# Patient Record
Sex: FEMALE | Race: WHITE | NOT HISPANIC OR LATINO | Employment: FULL TIME | URBAN - METROPOLITAN AREA
[De-identification: names, ages, dates, MRNs, and addresses within clinical notes are randomized per-mention and may not be internally consistent; named-entity substitution may affect disease eponyms.]

---

## 2023-08-22 PROBLEM — H81.10 BENIGN PAROXYSMAL POSITIONAL VERTIGO: Status: ACTIVE | Noted: 2023-08-22

## 2023-08-22 PROBLEM — E66.01 OBESITY, MORBID, BMI 50 OR HIGHER (MULTI): Status: ACTIVE | Noted: 2023-08-22

## 2023-08-22 PROBLEM — E11.9 TYPE 2 DIABETES MELLITUS WITHOUT COMPLICATION, WITHOUT LONG-TERM CURRENT USE OF INSULIN (MULTI): Status: ACTIVE | Noted: 2023-08-22

## 2023-08-22 PROBLEM — M25.519 SHOULDER PAIN: Status: ACTIVE | Noted: 2023-08-22

## 2023-08-22 PROBLEM — F41.9 ANXIETY DISORDER: Status: ACTIVE | Noted: 2023-08-22

## 2023-08-22 PROBLEM — E78.5 HYPERLIPIDEMIA: Status: ACTIVE | Noted: 2023-08-22

## 2023-08-22 PROBLEM — R42 DIZZINESS: Status: ACTIVE | Noted: 2023-08-22

## 2023-08-22 PROBLEM — R53.83 FATIGUE: Status: ACTIVE | Noted: 2023-08-22

## 2023-08-22 PROBLEM — I10 HYPERTENSION: Status: ACTIVE | Noted: 2023-08-22

## 2023-08-22 PROBLEM — E66.9 DIABETES MELLITUS TYPE 2 IN OBESE: Status: ACTIVE | Noted: 2023-08-22
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
## 2023-08-22 PROBLEM — F32.A DEPRESSION: Status: ACTIVE | Noted: 2023-08-22

## 2023-08-22 PROBLEM — M25.569 KNEE PAIN: Status: ACTIVE | Noted: 2023-08-22

## 2023-08-22 PROBLEM — I10 BENIGN ESSENTIAL HYPERTENSION: Status: ACTIVE | Noted: 2023-08-22

## 2023-08-22 PROBLEM — R07.9 CHEST PAIN: Status: ACTIVE | Noted: 2023-08-22

## 2023-08-22 PROBLEM — N81.10 FEMALE BLADDER PROLAPSE: Status: ACTIVE | Noted: 2023-08-22

## 2023-08-22 PROBLEM — R42 VERTIGO: Status: ACTIVE | Noted: 2023-08-22

## 2023-08-22 PROBLEM — E11.69 DIABETES MELLITUS TYPE 2 IN OBESE: Status: ACTIVE | Noted: 2023-08-22

## 2023-08-22 PROBLEM — S46.012A STRAIN OF LEFT ROTATOR CUFF CAPSULE: Status: ACTIVE | Noted: 2023-08-22

## 2023-08-22 PROBLEM — E78.00 HYPERCHOLESTEREMIA: Status: ACTIVE | Noted: 2023-08-22

## 2023-08-22 RX ORDER — METFORMIN HYDROCHLORIDE 500 MG/1
1000 TABLET, EXTENDED RELEASE ORAL DAILY
COMMUNITY
End: 2024-02-02 | Stop reason: SDUPTHER

## 2023-08-22 RX ORDER — VALSARTAN 160 MG/1
160 TABLET ORAL DAILY
COMMUNITY
End: 2024-02-20 | Stop reason: ALTCHOICE

## 2023-08-22 RX ORDER — PNV NO.95/FERROUS FUM/FOLIC AC 28MG-0.8MG
500 TABLET ORAL DAILY
COMMUNITY

## 2023-08-22 RX ORDER — CITALOPRAM 40 MG/1
20 TABLET, FILM COATED ORAL DAILY
COMMUNITY
End: 2024-02-05 | Stop reason: SDUPTHER

## 2023-08-22 RX ORDER — ATORVASTATIN CALCIUM 20 MG/1
20 TABLET, FILM COATED ORAL DAILY
COMMUNITY

## 2023-08-22 RX ORDER — DULAGLUTIDE 3 MG/.5ML
INJECTION, SOLUTION SUBCUTANEOUS
COMMUNITY
End: 2024-02-20 | Stop reason: ALTCHOICE

## 2023-08-22 RX ORDER — ASPIRIN 81 MG/1
81 TABLET ORAL DAILY
COMMUNITY

## 2023-08-22 RX ORDER — CLONAZEPAM 0.5 MG/1
1 TABLET ORAL EVERY 12 HOURS
COMMUNITY
Start: 2014-10-23 | End: 2024-02-20 | Stop reason: ALTCHOICE

## 2023-09-15 ENCOUNTER — HOSPITAL ENCOUNTER (OUTPATIENT)
Dept: DATA CONVERSION | Facility: HOSPITAL | Age: 53
Discharge: HOME | End: 2023-09-15
Payer: COMMERCIAL
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
## 2023-09-15 DIAGNOSIS — S81.851A OPEN BITE, RIGHT LOWER LEG, INITIAL ENCOUNTER: ICD-10-CM

## 2023-09-15 DIAGNOSIS — Z23 ENCOUNTER FOR IMMUNIZATION: ICD-10-CM

## 2023-09-19 RX ORDER — DULAGLUTIDE 4.5 MG/.5ML
4.5 INJECTION, SOLUTION SUBCUTANEOUS
Status: ON HOLD | COMMUNITY
Start: 2023-09-09 | End: 2024-06-03 | Stop reason: WASHOUT

## 2023-09-19 RX ORDER — NYSTATIN 100000 U/G
1 CREAM TOPICAL 2 TIMES DAILY PRN
COMMUNITY
Start: 2023-06-13

## 2023-09-19 RX ORDER — MECLIZINE HCL 12.5 MG 12.5 MG/1
12.5 TABLET ORAL EVERY 12 HOURS PRN
COMMUNITY
Start: 2023-07-27 | End: 2024-03-04 | Stop reason: WASHOUT

## 2023-09-19 RX ORDER — MELOXICAM 15 MG/1
15 TABLET ORAL DAILY
COMMUNITY
Start: 2023-03-31 | End: 2024-05-24 | Stop reason: WASHOUT

## 2023-09-19 RX ORDER — LANSOPRAZOLE 30 MG/1
30 CAPSULE, DELAYED RELEASE ORAL DAILY
COMMUNITY
Start: 2023-01-15 | End: 2024-02-20 | Stop reason: ALTCHOICE

## 2023-09-22 LAB
BACTERIA SPEC CULT: NORMAL
GRAM STN SPEC: NORMAL
REPORT STATUS -LH SQ DATA CONVERSION: NORMAL
SERVICE CMNT-IMP: NORMAL
SPECIMEN SOURCE: NORMAL

## 2023-09-29 ENCOUNTER — HOSPITAL ENCOUNTER (OUTPATIENT)
Dept: DATA CONVERSION | Facility: HOSPITAL | Age: 53
Discharge: HOME | End: 2023-09-29
Payer: COMMERCIAL

## 2023-09-29 DIAGNOSIS — T14.8XXA OTHER INJURY OF UNSPECIFIED BODY REGION, INITIAL ENCOUNTER: ICD-10-CM

## 2023-10-06 ENCOUNTER — OFFICE VISIT (OUTPATIENT)
Dept: WOUND CARE | Facility: HOSPITAL | Age: 53
End: 2023-10-06
Payer: COMMERCIAL

## 2023-10-06 PROCEDURE — 97597 DBRDMT OPN WND 1ST 20 CM/<: CPT

## 2023-10-06 PROCEDURE — 11042 DBRDMT SUBQ TIS 1ST 20SQCM/<: CPT

## 2023-10-09 ENCOUNTER — HOSPITAL ENCOUNTER (OUTPATIENT)
Dept: RADIOLOGY | Facility: HOSPITAL | Age: 53
Discharge: HOME | End: 2023-10-09
Payer: COMMERCIAL

## 2023-10-09 VITALS — WEIGHT: 225 LBS | HEIGHT: 65 IN | BODY MASS INDEX: 37.49 KG/M2

## 2023-10-09 DIAGNOSIS — Z12.31 ENCOUNTER FOR SCREENING MAMMOGRAM FOR MALIGNANT NEOPLASM OF BREAST: ICD-10-CM

## 2023-10-09 PROCEDURE — 77063 BREAST TOMOSYNTHESIS BI: CPT | Mod: 50

## 2023-10-12 ENCOUNTER — OFFICE VISIT (OUTPATIENT)
Dept: PRIMARY CARE | Facility: CLINIC | Age: 53
End: 2023-10-12
Payer: COMMERCIAL

## 2023-10-12 ENCOUNTER — HOSPITAL ENCOUNTER (OUTPATIENT)
Dept: RADIOLOGY | Facility: EXTERNAL LOCATION | Age: 53
Discharge: HOME | End: 2023-10-12

## 2023-10-12 VITALS
BODY MASS INDEX: 50.02 KG/M2 | OXYGEN SATURATION: 96 % | HEIGHT: 64 IN | DIASTOLIC BLOOD PRESSURE: 84 MMHG | RESPIRATION RATE: 16 BRPM | SYSTOLIC BLOOD PRESSURE: 130 MMHG | WEIGHT: 293 LBS | HEART RATE: 83 BPM

## 2023-10-12 DIAGNOSIS — Z12.4 PAP SMEAR FOR CERVICAL CANCER SCREENING: Primary | ICD-10-CM

## 2023-10-12 PROCEDURE — 88175 CYTOPATH C/V AUTO FLUID REDO: CPT | Mod: TC

## 2023-10-12 PROCEDURE — 3075F SYST BP GE 130 - 139MM HG: CPT

## 2023-10-12 PROCEDURE — 3044F HG A1C LEVEL LT 7.0%: CPT

## 2023-10-12 PROCEDURE — 3008F BODY MASS INDEX DOCD: CPT

## 2023-10-12 PROCEDURE — 3079F DIAST BP 80-89 MM HG: CPT

## 2023-10-12 PROCEDURE — 4010F ACE/ARB THERAPY RXD/TAKEN: CPT

## 2023-10-12 PROCEDURE — 88175 CYTOPATH C/V AUTO FLUID REDO: CPT | Mod: TC,GCY

## 2023-10-12 PROCEDURE — 99213 OFFICE O/P EST LOW 20 MIN: CPT

## 2023-10-12 PROCEDURE — 1036F TOBACCO NON-USER: CPT

## 2023-10-12 ASSESSMENT — PATIENT HEALTH QUESTIONNAIRE - PHQ9
2. FEELING DOWN, DEPRESSED OR HOPELESS: NOT AT ALL
SUM OF ALL RESPONSES TO PHQ9 QUESTIONS 1 AND 2: 0
1. LITTLE INTEREST OR PLEASURE IN DOING THINGS: NOT AT ALL

## 2023-10-12 ASSESSMENT — ENCOUNTER SYMPTOMS
OCCASIONAL FEELINGS OF UNSTEADINESS: 0
DEPRESSION: 0
LOSS OF SENSATION IN FEET: 0

## 2023-10-12 ASSESSMENT — PAIN SCALES - GENERAL: PAINLEVEL: 7

## 2023-10-12 NOTE — PROGRESS NOTES
"Subjective   Patient ID: Vilma Grover is a 53 y.o. female who presents for Gynecologic Exam./PAP.  She denies any changes in medical history since she was here last.  She is going to the wound clinic for the recent dog bite right lower leg.  The wound has begun tunneling  she is now packing it and removing the dressing daily.  She is due to go back to the wound clinic tomorrow October 13.      HPI     Review of Systems  Review of Systems negative except as noted in HPI and Chief complaint.     Objective   /84 (BP Location: Left arm, Patient Position: Sitting, BP Cuff Size: Adult)   Pulse 83   Resp 16   Ht 1.626 m (5' 4\")   Wt 143 kg (314 lb 9.6 oz)   LMP 09/25/2023   SpO2 96%   BMI 54.00 kg/m²     Physical Exam  EXTERNAL GENITALIA WAS NORMAL. NO CERVICAL MOTION TENDERNESS. NO CERVICAL LESION. OS VISUALIZED. CLEAR VAGINAL DISCHARGE WAS PRESENT. NO CYSTOCELE OR RECTOCELE. URETHRA AND MEATUS WERE NORMAL. RECTUM AND PERINEUM WERE NORMAL. NO ADNEXAL TENDERNESS.     Right leg wound wrapped, packed, and covered with a sleeve.     Assessment/Plan   Diagnoses and all orders for this visit:  Pap smear for cervical cancer screening  -     THINPREP PAP TEST       Rachel Savage, KAMI   "

## 2023-10-13 ENCOUNTER — OFFICE VISIT (OUTPATIENT)
Dept: WOUND CARE | Facility: HOSPITAL | Age: 53
End: 2023-10-13
Payer: COMMERCIAL

## 2023-10-13 PROCEDURE — 11042 DBRDMT SUBQ TIS 1ST 20SQCM/<: CPT

## 2023-10-19 ENCOUNTER — APPOINTMENT (OUTPATIENT)
Dept: OTOLARYNGOLOGY | Facility: CLINIC | Age: 53
End: 2023-10-19
Payer: COMMERCIAL

## 2023-10-20 ENCOUNTER — OFFICE VISIT (OUTPATIENT)
Dept: WOUND CARE | Facility: HOSPITAL | Age: 53
End: 2023-10-20
Payer: COMMERCIAL

## 2023-10-20 PROCEDURE — 11042 DBRDMT SUBQ TIS 1ST 20SQCM/<: CPT

## 2023-10-26 LAB
CYTOLOGY CMNT CVX/VAG CYTO-IMP: NORMAL
LAB AP HPV GENOTYPE QUESTION: YES
LAB AP HPV HR: NORMAL
LABORATORY COMMENT REPORT: NORMAL
MENSTRUAL HX REPORTED: NORMAL
PATH REPORT.TOTAL CANCER: NORMAL

## 2023-10-27 ENCOUNTER — OFFICE VISIT (OUTPATIENT)
Dept: WOUND CARE | Facility: HOSPITAL | Age: 53
End: 2023-10-27
Payer: COMMERCIAL

## 2023-10-27 PROCEDURE — 11042 DBRDMT SUBQ TIS 1ST 20SQCM/<: CPT

## 2023-11-03 ENCOUNTER — APPOINTMENT (OUTPATIENT)
Dept: WOUND CARE | Facility: HOSPITAL | Age: 53
End: 2023-11-03
Payer: COMMERCIAL

## 2023-11-07 ENCOUNTER — OFFICE VISIT (OUTPATIENT)
Dept: WOUND CARE | Facility: HOSPITAL | Age: 53
End: 2023-11-07
Payer: COMMERCIAL

## 2023-11-07 PROCEDURE — 11042 DBRDMT SUBQ TIS 1ST 20SQCM/<: CPT

## 2023-11-17 ENCOUNTER — OFFICE VISIT (OUTPATIENT)
Dept: WOUND CARE | Facility: HOSPITAL | Age: 53
End: 2023-11-17
Payer: COMMERCIAL

## 2023-11-17 PROCEDURE — 11042 DBRDMT SUBQ TIS 1ST 20SQCM/<: CPT

## 2023-12-01 ENCOUNTER — OFFICE VISIT (OUTPATIENT)
Dept: WOUND CARE | Facility: HOSPITAL | Age: 53
End: 2023-12-01
Payer: COMMERCIAL

## 2023-12-01 PROCEDURE — 11042 DBRDMT SUBQ TIS 1ST 20SQCM/<: CPT

## 2023-12-08 ENCOUNTER — OFFICE VISIT (OUTPATIENT)
Dept: WOUND CARE | Facility: HOSPITAL | Age: 53
End: 2023-12-08
Payer: COMMERCIAL

## 2023-12-08 PROCEDURE — 11042 DBRDMT SUBQ TIS 1ST 20SQCM/<: CPT

## 2023-12-15 ENCOUNTER — OFFICE VISIT (OUTPATIENT)
Dept: WOUND CARE | Facility: HOSPITAL | Age: 53
End: 2023-12-15
Payer: COMMERCIAL

## 2023-12-15 PROCEDURE — 11042 DBRDMT SUBQ TIS 1ST 20SQCM/<: CPT

## 2023-12-22 ENCOUNTER — OFFICE VISIT (OUTPATIENT)
Dept: WOUND CARE | Facility: HOSPITAL | Age: 53
End: 2023-12-22
Payer: COMMERCIAL

## 2023-12-22 PROCEDURE — 11042 DBRDMT SUBQ TIS 1ST 20SQCM/<: CPT

## 2023-12-26 ENCOUNTER — TELEPHONE (OUTPATIENT)
Dept: PRIMARY CARE | Facility: CLINIC | Age: 53
End: 2023-12-26
Payer: COMMERCIAL

## 2023-12-26 DIAGNOSIS — R26.89 BALANCE PROBLEM: Primary | ICD-10-CM

## 2023-12-26 NOTE — TELEPHONE ENCOUNTER
Patient called and tried to get in to her Vestibular therapist. They told her that the last referral that was placed had been closed out. Can you place another referral? Call patient when placed.

## 2023-12-27 NOTE — TELEPHONE ENCOUNTER
I spoke with the patient and she said that she went to [facility] but she would prefer to go to [facility].

## 2024-01-02 ENCOUNTER — TELEPHONE (OUTPATIENT)
Dept: PRIMARY CARE | Facility: CLINIC | Age: 54
End: 2024-01-02
Payer: COMMERCIAL

## 2024-01-02 DIAGNOSIS — M25.569 KNEE PAIN, UNSPECIFIED CHRONICITY, UNSPECIFIED LATERALITY: Primary | ICD-10-CM

## 2024-01-02 NOTE — TELEPHONE ENCOUNTER
Patient is wondering if she can have a referral for an orthopaedic doctor for her knee. She is having pain and thinks there may be a cyst on the back of her knee.

## 2024-01-05 ENCOUNTER — OFFICE VISIT (OUTPATIENT)
Dept: WOUND CARE | Facility: HOSPITAL | Age: 54
End: 2024-01-05
Payer: COMMERCIAL

## 2024-01-05 PROCEDURE — 11042 DBRDMT SUBQ TIS 1ST 20SQCM/<: CPT

## 2024-01-09 ENCOUNTER — APPOINTMENT (OUTPATIENT)
Dept: PHYSICAL THERAPY | Facility: CLINIC | Age: 54
End: 2024-01-09
Payer: COMMERCIAL

## 2024-01-10 ENCOUNTER — EVALUATION (OUTPATIENT)
Dept: PHYSICAL THERAPY | Facility: CLINIC | Age: 54
End: 2024-01-10
Payer: COMMERCIAL

## 2024-01-10 DIAGNOSIS — H81.11 BENIGN PAROXYSMAL POSITIONAL VERTIGO OF RIGHT EAR: Primary | ICD-10-CM

## 2024-01-10 DIAGNOSIS — R26.89 BALANCE PROBLEM: ICD-10-CM

## 2024-01-10 PROCEDURE — 97161 PT EVAL LOW COMPLEX 20 MIN: CPT | Mod: GP | Performed by: PHYSICAL THERAPIST

## 2024-01-10 NOTE — PROGRESS NOTES
Vestibular Evaluation    Patient Name: Vilma Grover  MRN: [id number]  Today's Date: 01/10/24  Low complexity due to patient's clinical presentation being stable and uncomplicated by any significant comorbidities that may affect rehab tolerance and progression.  Time Calculation  Start Time: 1015  Stop Time: 1053  Time Calculation (min): 38 min     Current Problem:   1. Benign paroxysmal positional vertigo of right ear  Follow Up In Physical Therapy      2. Balance problem  Referral to Physical Therapy          Subjective   General Visit Information:  General  Reason for Referral: Dizziness  Referred By: Rachel Savage  General Comment: Pt has BPPV, she reports occasionally it will come and it will go away other times she needs to take therapy. She reports that she will take meclazine which sometimes helps but otherwise. She does not report any issues with walking, balance, driving or turning head.  Precautions:  Precautions  Precautions Comment: BPPV      Objective     Neuro Oculomotor:  Oculomotor Exam All Normal: Yes  Neuro Vestibular:  Horizontal VOR: Negative  Vertical VOR: Negative  Positional Testing:  Newdale-Halpike Right: negative  Elyssa-Halpike Left: negative  Horizontal Roll Test Right: positive      Outcome Measures:  Other Measures  Dizziness Handicap Inventory: 18%     OP EDUCATION:  Outpatient Education  Individual(s) Educated: Patient  Risk and Benefits Discussed with Patient/Caregiver/Other: yes  Patient/Caregiver Demonstrated Understanding: yes  Plan of Care Discussed and Agreed Upon: yes  Patient Response to Education: Patient/Caregiver Verbalized Understanding of Information, Patient/Caregiver Performed Return Demonstration of Exercises/Activities, Patient/Caregiver Asked Appropriate Questions    Assessment   Assessment:   PT Assessment Results:  (Dizziness)  Rehab Prognosis: Excellent  Assessment Comment: Pt is a 53 y.o female presenting to therapy with complaints of BPPV. She was found to have  lateral canal R side BPPV. She responded well to initial test and treatment and would benefit from continued skilled physical therapy in order to prevent reoccuring dizziness and reduction in functional decline.      Plan:  Treatment/Interventions: Canalith repositioning, Neuromuscular re-education  PT Plan: Skilled PT  PT Frequency: 1 time per week  Duration: 4  Number of Treatments Authorized: AUTH REQUIRED  Rehab Potential: Excellent    Goals:  Active       PT Problem       Pt will be 100% IND with HEP in 4 weeks in order to maintain progress with therapy.         Start:  01/10/24    Expected End:  02/09/24            Pt will report a reduction in dizziness score to 0/10 in 4 weeks in order to improve work and home tasks including cooking, cleaning and dressing        Start:  01/10/24    Expected End:  02/09/24

## 2024-01-11 ENCOUNTER — ANCILLARY PROCEDURE (OUTPATIENT)
Dept: RADIOLOGY | Facility: CLINIC | Age: 54
End: 2024-01-11
Payer: COMMERCIAL

## 2024-01-11 ENCOUNTER — OFFICE VISIT (OUTPATIENT)
Dept: ORTHOPEDIC SURGERY | Facility: CLINIC | Age: 54
End: 2024-01-11
Payer: COMMERCIAL

## 2024-01-11 VITALS — WEIGHT: 293 LBS | HEIGHT: 64 IN | BODY MASS INDEX: 50.02 KG/M2

## 2024-01-11 DIAGNOSIS — M25.561 RIGHT KNEE PAIN, UNSPECIFIED CHRONICITY: ICD-10-CM

## 2024-01-11 DIAGNOSIS — M25.561 CHRONIC PAIN OF RIGHT KNEE: Primary | ICD-10-CM

## 2024-01-11 DIAGNOSIS — M25.569 KNEE PAIN, UNSPECIFIED CHRONICITY, UNSPECIFIED LATERALITY: ICD-10-CM

## 2024-01-11 DIAGNOSIS — G89.29 CHRONIC PAIN OF RIGHT KNEE: Primary | ICD-10-CM

## 2024-01-11 PROCEDURE — 73564 X-RAY EXAM KNEE 4 OR MORE: CPT | Mod: RT

## 2024-01-11 PROCEDURE — 99205 OFFICE O/P NEW HI 60 MIN: CPT | Performed by: STUDENT IN AN ORGANIZED HEALTH CARE EDUCATION/TRAINING PROGRAM

## 2024-01-11 PROCEDURE — 1036F TOBACCO NON-USER: CPT | Performed by: STUDENT IN AN ORGANIZED HEALTH CARE EDUCATION/TRAINING PROGRAM

## 2024-01-11 PROCEDURE — 4010F ACE/ARB THERAPY RXD/TAKEN: CPT | Performed by: STUDENT IN AN ORGANIZED HEALTH CARE EDUCATION/TRAINING PROGRAM

## 2024-01-11 PROCEDURE — 3008F BODY MASS INDEX DOCD: CPT | Performed by: STUDENT IN AN ORGANIZED HEALTH CARE EDUCATION/TRAINING PROGRAM

## 2024-01-11 PROCEDURE — 73564 X-RAY EXAM KNEE 4 OR MORE: CPT | Mod: RIGHT SIDE | Performed by: RADIOLOGY

## 2024-01-11 ASSESSMENT — PAIN - FUNCTIONAL ASSESSMENT: PAIN_FUNCTIONAL_ASSESSMENT: 0-10

## 2024-01-11 ASSESSMENT — PAIN SCALES - GENERAL: PAINLEVEL_OUTOF10: 5 - MODERATE PAIN

## 2024-01-11 ASSESSMENT — PAIN DESCRIPTION - DESCRIPTORS: DESCRIPTORS: ACHING

## 2024-01-11 NOTE — PROGRESS NOTES
AZUL/TKA Related Summary           R hip: N  R knee: N  L hip: N  L knee: N  Lumbar surgery/pathology: N            CC/SUBJECTIVE/HPI            PCP: АЛЕКСАНДР Alves-CNP  Referring provider: АЛЕКСАНДР Alves*  Occupation: Works from home in [employer]  Vilma Grover is a 53 y.o. female presenting for R knee pain.  This knee pain has been bothering her for about 2 months and is brought on mainly by walking.  Currently, it is not significantly limiting her. Of note, she also sustained a dog bite to the ipsilateral leg in 9/2023, which has been cared for by [facility] wound clinic.  Currently, she has a very small open wound along the mid medial calf that she continues packing as it closes.    R knee symptoms  Pain: 5/10  Onset: chronic/gradual  Duration: 3mo-1yr  Location: behind knee, diffuse  Quality: dull/ache  Associated symptoms: limp, weakness, feeling unstable, and difficulty with stairs  Ambulation limit due to pain: unlimited but hurts later  Other symptoms: none  R knee treatment to date  Tried: activity modification, ice/heat, meloxicam  Most recent injection date: none  Assistive device: none  Treatment attempted for <3mo and is partially effective            HISTORIES (System Generated and Pt-Reported on Form Today)       Dental  Pt-reported: No active issues     PMH  Pt-reported: Denies heart/lung/kidney/liver issues, DM, stroke, seizure, bariatric surgery, anticoag, MRSA, cancer, personal/familial coagulopathies except: sleep apnea (CPAP: Y)  System-generated (PMH, problem list both included since EMR change caused discrepancies): No past medical history on file.   Patient Active Problem List   Diagnosis    Accidental fall    Anxiety disorder    Benign essential hypertension    Benign paroxysmal positional vertigo    Chest pain    Depression    Fatigue    Female bladder prolapse    Hypercholesteremia    Hyperlipidemia    Hypertension    Knee pain    Obesity, morbid, BMI 50 or higher  (CMS/HCC)    Shoulder pain    Strain of left rotator cuff capsule    Diabetes mellitus type 2 in obese (CMS/HCC)    Type 2 diabetes mellitus without complication, without long-term current use of insulin (CMS/HCC)    Vertigo    Dizziness     PSH  Pt-reported: Per above.   System-generated:   Past Surgical History:   Procedure Laterality Date     SECTION, CLASSIC  10/23/2014     Section    OTHER SURGICAL HISTORY  10/23/2014    Bunion Correction By [facility] Procedure     Family Hx  Pt-reported clot/coagulopathies: blood clot (Family member: Mother. Condition: DVT and PE)  System-generated:   Family History   Problem Relation Name Age of Onset    Dementia Mother      Coronary artery disease Mother      Heart attack Mother      Other (Uterine leiomyoma) Mother      Prostate cancer Father          in remission heart vave replacment in 2023    Anxiety disorder Brother      Depression Brother      Migraines Daughter      Other (bruises easily) Daughter       Social Hx  Pt-reported substance use: EtOH (occasional) and cigarettes (quit in 2018)  System-generated:   Social History     Tobacco Use    Smoking status: Former     Years: 5     Types: Cigarettes     Passive exposure: Past    Smokeless tobacco: Never   Vaping Use    Vaping Use: Never used   Substance Use Topics    Alcohol use: Yes     Alcohol/week: 2.0 standard drinks of alcohol     Types: 2 Glasses of wine per week    Drug use: Never     Allergies  Pt-reported (meds, metals): per below  System-generated:   Allergies   Allergen Reactions    Erythromycin Base Unknown     Upset stomach    Lisinopril Cough     Current Meds  System-generated:   Current Outpatient Medications:     aspirin (Kappa Aspirin) 81 mg EC tablet, Take 1 tablet (81 mg) by mouth once daily., Disp: , Rfl:     atorvastatin (Lipitor) 20 mg tablet, Take 1 tablet (20 mg) by mouth once daily., Disp: , Rfl:     calcium carbonate/vitamin D3 (CALCIUM 600 + D,3, ORAL), Calcium + D3, Disp:  ", Rfl:     citalopram (CeleXA) 40 mg tablet, Take 0.5 tablets (20 mg) by mouth once daily., Disp: , Rfl:     clonazePAM (KlonoPIN) 0.5 mg tablet, Take 1 tablet (0.5 mg) by mouth every 12 hours., Disp: , Rfl:     cyanocobalamin (Vitamin B-12) 1,000 mcg tablet, Take 100 mcg by mouth once daily. For 30 days, Disp: , Rfl:     dulaglutide (Trulicity) 3 mg/0.5 mL pen injector, as directed Subcutaneous, Disp: , Rfl:     lansoprazole (Prevacid) 30 mg DR capsule, Take 1 capsule (30 mg) by mouth once daily., Disp: , Rfl:     meclizine (Antivert) 12.5 mg tablet, Take 1 tablet (12.5 mg) by mouth every 12 hours if needed., Disp: , Rfl:     meloxicam (Mobic) 15 mg tablet, Take 1 tablet (15 mg) by mouth once daily., Disp: , Rfl:     metFORMIN XR (Glucophage-XR) 500 mg 24 hr tablet, Take 2 tablets (1,000 mg) by mouth once daily., Disp: , Rfl:     MULTIVITAMIN ORAL, Take 1 tablet by mouth once daily. For 30 days, Disp: , Rfl:     NON FORMULARY, Tumeric 1 cap twice a day, Disp: , Rfl:     nystatin (Mycostatin) cream, APPLY TO THE AFFECTED AREA(S) TWICE DAILY, Disp: , Rfl:     Trulicity 4.5 mg/0.5 mL pen injector, Inject 4.5 mg under the skin 1 (one) time per week., Disp: , Rfl:     valsartan (Diovan) 160 mg tablet, Take 1 tablet (160 mg) by mouth once daily., Disp: , Rfl:     ROS: Neg except HPI            OBJECTIVE            Physical exam  Estimated body mass index is 54 kg/m² as calculated from the following:    Height as of 10/12/23: 1.626 m (5' 4\").    Weight as of 10/12/23: 143 kg (314 lb 9.6 oz).  Gen/psych: NAD, conversational, appropriate    Ambulation  Gait: normal  Assistive device: none  Spine  Lumbar spine tenderness: neg  Limited ROM: neg, with no radiation or increased pain with flex/ex, lateral bending  Straight leg raise test: neg    Nonoperated leg focused MSK exam: R  Knee  Thrust: neg  Skin/incision: Normal in area of planned/possible incision (medial parapatellar approach).  Very small opening from known dog bite " wound along the mid medial calf with packing.  Effusion: Difficult to ascertain given habitus  Alignment: neutral  Alignment correctable: na  Knee tenderness: diffuse  Pes or Gerdy's tenderness: neg  Crepitation: trace  Extension: passive flexion contracture 5-10° and active extension lag 0°  Flexion: 110°, limited by habitus  Anterior drawer: Grossly stable, although difficult to ascertain given habitus  Posterior drawer: Grossly stable, although difficult to ascertain given habitus  Varus/Valgus in extension: stable  Varus/Valgus in flexion: stable  Yoel: neg  Referred pain to knee with hip 90° flexion and 45° ER/IR: neg  Neurovascular  Strength: 5/5 hip/knee/ankle flexion and extension  Sensory (L2-S1): SILT throughout lower extremity  Edema/stasis: 0-10mm pitting edema  Pulse: DP 1+, PT 1+    Imaging  1/11/2024 L knee radiographs ([facility]chant, WB AP/lateral, WB AP flexion) on my read: Joint space narrowing (medial tibiofemoral moderate, lateral tibiofemoral mild, patellofemoral mild) with sclerosis. Limb alignment neutral.            ASSESSMENT & PLAN           Patient verbalized understanding of below A&P. All questions answered.  #1: R knee DJD  General information  The evaluation, imaging, diagnosis, and treatment options (conservative and surgical as well as risks, benefits, recovery, and outcomes) were discussed. Conservative options should be maximized and include activity modification, bracing, weight loss, PT, home exercise, local pain control (ice, heat, topical medications), OTCs, and assistive devices. Once these are exhausted, injections may provide relief. If these treatments fail to improve pain, function, and quality of life, elective arthroplasty may be a viable option.  Shared decision making:   Patient elected conservative treatment. PT prescription provided.  Discussed using over-the-counter medications, including Tylenol and ibuprofen.  Other considerations discussed  If she were to progress  to surgery over the next few years, relatively young age leads to higher risk of revision surgery at some point in life.  Discussed the mild fullness in the back of her knee is likely a Baker's cyst.  She will let me know if this continues to bother her, and we can order an ultrasound to confirm.  PMH/PSH notable for  If pt progresses to desiring surgery in the future, would need BMI<40.  Hx of diabetes on Trulicity and metformin and understands requirement for A1c <7.5 for elective arthroplasty (most recently 6.6 on 6/7/2023).  Follow-up: as needed.  #2: R leg dog bite wound  Dog bite wound appears to be healing well.  Continue follow-up with [facility] wound management.

## 2024-01-12 ENCOUNTER — OFFICE VISIT (OUTPATIENT)
Dept: WOUND CARE | Facility: HOSPITAL | Age: 54
End: 2024-01-12
Payer: COMMERCIAL

## 2024-01-12 PROCEDURE — 11042 DBRDMT SUBQ TIS 1ST 20SQCM/<: CPT

## 2024-01-19 ENCOUNTER — APPOINTMENT (OUTPATIENT)
Dept: WOUND CARE | Facility: HOSPITAL | Age: 54
End: 2024-01-19
Payer: COMMERCIAL

## 2024-01-23 ENCOUNTER — APPOINTMENT (OUTPATIENT)
Dept: PHYSICAL THERAPY | Facility: CLINIC | Age: 54
End: 2024-01-23
Payer: COMMERCIAL

## 2024-01-26 ENCOUNTER — OFFICE VISIT (OUTPATIENT)
Dept: WOUND CARE | Facility: HOSPITAL | Age: 54
End: 2024-01-26
Payer: COMMERCIAL

## 2024-01-26 PROCEDURE — 11042 DBRDMT SUBQ TIS 1ST 20SQCM/<: CPT

## 2024-01-30 ENCOUNTER — EVALUATION (OUTPATIENT)
Dept: PHYSICAL THERAPY | Facility: CLINIC | Age: 54
End: 2024-01-30
Payer: COMMERCIAL

## 2024-01-30 DIAGNOSIS — M25.561 CHRONIC PAIN OF RIGHT KNEE: ICD-10-CM

## 2024-01-30 DIAGNOSIS — G89.29 CHRONIC PAIN OF RIGHT KNEE: ICD-10-CM

## 2024-01-30 PROCEDURE — 97110 THERAPEUTIC EXERCISES: CPT | Mod: GP | Performed by: PHYSICAL THERAPIST

## 2024-01-30 PROCEDURE — 97530 THERAPEUTIC ACTIVITIES: CPT | Mod: GP | Performed by: PHYSICAL THERAPIST

## 2024-01-30 PROCEDURE — 97162 PT EVAL MOD COMPLEX 30 MIN: CPT | Mod: GP | Performed by: PHYSICAL THERAPIST

## 2024-01-30 ASSESSMENT — ENCOUNTER SYMPTOMS
OCCASIONAL FEELINGS OF UNSTEADINESS: 0
DEPRESSION: 0
LOSS OF SENSATION IN FEET: 0

## 2024-01-30 NOTE — PROGRESS NOTES
"Physical Therapy Evaluation and Treatment      Patient Name: Vilma Grover  MRN: [id number]  Today's Date: 1/30/2024      Current Problem:   1. Chronic pain of right knee  Referral to Physical Therapy          Subjective    General:  Pt reports she is getting pain in the back of her right knee. Xrays negative Pain has been going on for over a month. Right leg will sometimes feel weak and like it is going to give out. Walking and standing for too long aggravate the right knee with \"pulling.\" Pain will sometimes wake her up in the middle of the night. Also got bite by dog in September, currently seeing wound care for this. Desk job, works from home.       Precautions: None  Pain: \"Annoyance\" 5/10    Objective   ROM   Knee: Right: Flexion: AROM 106, Extension: AROM 0      MMT   Right LE strength:  Hip flex 4/5  Knee flex 4+/5  Knee ext 4+/5    Dermatomes/Myotomes/Reflexes  Denies numbness/tingling    Palpation   TTP right patella. TTP right distal hamstrings and gastroc  No edema    Flexibility  Moderate tightness in right quad and hamstrings     Special Tests   Knee: Yoel: Right negative, Valgus: Right negative, Varus: Right negative, PF Grind: Right positive     Transfers   Normal, does not require support    Gait   Ambulates with decreased naty and antaglic gait. Decreased stance time and step length on right. Slight trendelenberg pattern     Stairs   Ascend and descend steps with step-to-pattern with 1 rail     Outcome Measures:  LEFS: 50/80    Treatments:  Therapeutic Exercise:  Stand hamstring stretch  SLR  Bridge  Mini squat    Therapeutic Activity:  Educated pt on eval findings and POC  Educated pt on anatomy of knee.  Discussed activities to perform at gym    Assessment   Pt is a 53 y.o. female with right knee bursitis. Pt with gait deficits, decreased ROM, reduced strength, abnormal posture, and flexibility limitations. Pt will benefit from skilled PT to address the above deficits for improvement in " functional activities.       Moderate complexity due to patient's clinical presentation being evolving with changing characteristics, with comorbidities to include OA and DM, all of which may negatively impact rehab tolerance and progression.     Plan:   Treatment/Interventions: Dry needling, Education/ Instruction, Gait training, Manual therapy, Neuromuscular re-education, Self care/ home management, Therapeutic activities, Therapeutic exercises  PT Plan: Skilled PT  PT Frequency: 1 time per week  Duration: 4 more visits  Number of Treatments Authorized: 12  Rehab Potential: Excellent  Plan of Care Agreement: Patient        Goals:   Active       Mobility       Goal 1       Start:  01/30/24    Expected End:  04/29/24       Pt will improve right knee AROM to WNL to improve IADLs         Goal 2       Start:  01/30/24    Expected End:  04/29/24       Pt will improve right LE strength to 5/5 to improve IADLs.            PT Problem       Pt will be 100% IND with HEP in 4 weeks in order to maintain progress with therapy.         Start:  01/10/24    Expected End:  02/09/24            Pt will report a reduction in dizziness score to 0/10 in 4 weeks in order to improve work and home tasks including cooking, cleaning and dressing        Start:  01/10/24    Expected End:  02/09/24               Pain       Goal 1       Start:  01/30/24    Expected End:  04/29/24       Pt will perform all housework with 0/10 pain         Goal 2       Start:  01/30/24    Expected End:  04/29/24       Pt will stand/walk for >30 minutes with 0/10 pain

## 2024-02-01 ENCOUNTER — APPOINTMENT (OUTPATIENT)
Dept: PHYSICAL THERAPY | Facility: CLINIC | Age: 54
End: 2024-02-01
Payer: COMMERCIAL

## 2024-02-02 ENCOUNTER — APPOINTMENT (OUTPATIENT)
Dept: WOUND CARE | Facility: HOSPITAL | Age: 54
End: 2024-02-02
Payer: COMMERCIAL

## 2024-02-02 DIAGNOSIS — E11.9 TYPE 2 DIABETES MELLITUS WITHOUT COMPLICATION, WITHOUT LONG-TERM CURRENT USE OF INSULIN (MULTI): ICD-10-CM

## 2024-02-02 NOTE — TELEPHONE ENCOUNTER
See Rx tab. Correct pharmacy selected. Patient was seen in October and is not sure when you would like to see her back?

## 2024-02-03 RX ORDER — METFORMIN HYDROCHLORIDE 500 MG/1
1000 TABLET, EXTENDED RELEASE ORAL
Qty: 120 TABLET | Refills: 0 | Status: SHIPPED | OUTPATIENT
Start: 2024-02-03 | End: 2024-02-20 | Stop reason: SDUPTHER

## 2024-02-05 DIAGNOSIS — F41.1 GENERALIZED ANXIETY DISORDER: Primary | ICD-10-CM

## 2024-02-05 RX ORDER — CITALOPRAM 40 MG/1
20 TABLET, FILM COATED ORAL DAILY
Qty: 15 TABLET | Refills: 2 | Status: SHIPPED | OUTPATIENT
Start: 2024-02-05 | End: 2024-03-18

## 2024-02-06 ENCOUNTER — OFFICE VISIT (OUTPATIENT)
Dept: WOUND CARE | Facility: HOSPITAL | Age: 54
End: 2024-02-06
Payer: COMMERCIAL

## 2024-02-06 PROCEDURE — 97597 DBRDMT OPN WND 1ST 20 CM/<: CPT

## 2024-02-20 ENCOUNTER — OFFICE VISIT (OUTPATIENT)
Dept: PRIMARY CARE | Facility: CLINIC | Age: 54
End: 2024-02-20
Payer: COMMERCIAL

## 2024-02-20 VITALS
WEIGHT: 293 LBS | RESPIRATION RATE: 16 BRPM | HEIGHT: 64 IN | OXYGEN SATURATION: 96 % | SYSTOLIC BLOOD PRESSURE: 123 MMHG | HEART RATE: 73 BPM | DIASTOLIC BLOOD PRESSURE: 80 MMHG | BODY MASS INDEX: 50.02 KG/M2

## 2024-02-20 DIAGNOSIS — G25.81 RLS (RESTLESS LEGS SYNDROME): ICD-10-CM

## 2024-02-20 DIAGNOSIS — G47.33 OBSTRUCTIVE SLEEP APNEA SYNDROME: ICD-10-CM

## 2024-02-20 DIAGNOSIS — E11.9 TYPE 2 DIABETES MELLITUS WITHOUT COMPLICATION, WITHOUT LONG-TERM CURRENT USE OF INSULIN (MULTI): Primary | ICD-10-CM

## 2024-02-20 DIAGNOSIS — I10 BENIGN ESSENTIAL HYPERTENSION: ICD-10-CM

## 2024-02-20 DIAGNOSIS — E78.2 MIXED HYPERLIPIDEMIA: ICD-10-CM

## 2024-02-20 DIAGNOSIS — E66.01 OBESITY, MORBID, BMI 50 OR HIGHER (MULTI): ICD-10-CM

## 2024-02-20 LAB — POC HEMOGLOBIN A1C: 6.5 % (ref 4.2–6.5)

## 2024-02-20 PROCEDURE — 83036 HEMOGLOBIN GLYCOSYLATED A1C: CPT | Mod: QW,59

## 2024-02-20 PROCEDURE — 1036F TOBACCO NON-USER: CPT

## 2024-02-20 PROCEDURE — 3074F SYST BP LT 130 MM HG: CPT

## 2024-02-20 PROCEDURE — 4010F ACE/ARB THERAPY RXD/TAKEN: CPT

## 2024-02-20 PROCEDURE — 99214 OFFICE O/P EST MOD 30 MIN: CPT

## 2024-02-20 PROCEDURE — 3008F BODY MASS INDEX DOCD: CPT

## 2024-02-20 PROCEDURE — 3079F DIAST BP 80-89 MM HG: CPT

## 2024-02-20 RX ORDER — METFORMIN HYDROCHLORIDE 500 MG/1
1000 TABLET, EXTENDED RELEASE ORAL
Qty: 180 TABLET | Refills: 1 | Status: SHIPPED | OUTPATIENT
Start: 2024-02-20 | End: 2024-08-18

## 2024-02-20 RX ORDER — VALSARTAN 320 MG/1
320 TABLET ORAL DAILY
Qty: 90 TABLET | Refills: 1 | Status: SHIPPED | OUTPATIENT
Start: 2024-02-20 | End: 2024-08-18

## 2024-02-20 ASSESSMENT — ENCOUNTER SYMPTOMS
LOSS OF SENSATION IN FEET: 0
DEPRESSION: 0
OCCASIONAL FEELINGS OF UNSTEADINESS: 0

## 2024-02-20 ASSESSMENT — PATIENT HEALTH QUESTIONNAIRE - PHQ9
SUM OF ALL RESPONSES TO PHQ9 QUESTIONS 1 AND 2: 0
1. LITTLE INTEREST OR PLEASURE IN DOING THINGS: NOT AT ALL
2. FEELING DOWN, DEPRESSED OR HOPELESS: NOT AT ALL

## 2024-02-20 ASSESSMENT — COLUMBIA-SUICIDE SEVERITY RATING SCALE - C-SSRS
6. HAVE YOU EVER DONE ANYTHING, STARTED TO DO ANYTHING, OR PREPARED TO DO ANYTHING TO END YOUR LIFE?: NO
2. HAVE YOU ACTUALLY HAD ANY THOUGHTS OF KILLING YOURSELF?: NO
1. IN THE PAST MONTH, HAVE YOU WISHED YOU WERE DEAD OR WISHED YOU COULD GO TO SLEEP AND NOT WAKE UP?: NO

## 2024-02-20 ASSESSMENT — PAIN SCALES - GENERAL: PAINLEVEL: 0-NO PAIN

## 2024-02-20 NOTE — PROGRESS NOTES
"Subjective   Patient ID: Vilma Grover is a 53 y.o. female who presents for DM follow up    HPI   Patient is here for 3-month follow-up.  She is not monitoring her blood sugars at home she is currently taking metformin 1000 mg daily.  Trulicity dose was at 4.5 weekly Fridays her injection today does not monitor her blood sugars.  She is feeling well she has finished care with the wound clinic for the dog bite that I saw her for in September.  Denies any falls, urgent care, ER, hospitalization, new diagnoses or surgeries.  Denies any issues with chest pain, chest pressure, shortness of breath, constipation, diarrhea, blood in stool.  She does admit to intermittent numbness in her feet.  She is overdue for diabetic eye exam notes she will make an appointment to have that done.  She does monitor her blood pressures at home she feels they generally run higher than they are here today.  We did discuss bringing her blood pressure cuff and comparing it against her blood pressure monitor in the office.  She does admit to restless legs at night when she is trying to sleep.  She has been diagnosed with sleep apnea she has CPAP but has not followed up with sleep medicine in \"years\"  Review of Systems  Review of Systems negative except as noted in HPI and Chief complaint.    Current Outpatient Medications:     aspirin ([place] Aspirin) 81 mg EC tablet, Take 1 tablet (81 mg) by mouth once daily., Disp: , Rfl:     atorvastatin (Lipitor) 20 mg tablet, Take 1 tablet (20 mg) by mouth once daily., Disp: , Rfl:     calcium carbonate/vitamin D3 (CALCIUM 600 + D,3, ORAL), Calcium + D3, Disp: , Rfl:     citalopram (CeleXA) 40 mg tablet, Take 0.5 tablets (20 mg) by mouth once daily., Disp: 15 tablet, Rfl: 2    cyanocobalamin (Vitamin B-12) 1,000 mcg tablet, Take 100 mcg by mouth once daily. For 30 days, Disp: , Rfl:     meclizine (Antivert) 12.5 mg tablet, Take 1 tablet (12.5 mg) by mouth every 12 hours if needed., Disp: , Rfl:     " "meloxicam (Mobic) 15 mg tablet, Take 1 tablet (15 mg) by mouth once daily., Disp: , Rfl:     MULTIVITAMIN ORAL, Take 1 tablet by mouth once daily. For 30 days, Disp: , Rfl:     NON FORMULARY, Tumeric 1 cap twice a day, Disp: , Rfl:     nystatin (Mycostatin) cream, APPLY TO THE AFFECTED AREA(S) TWICE DAILY, Disp: , Rfl:     Trulicity 4.5 mg/0.5 mL pen injector, Inject 4.5 mg under the skin 1 (one) time per week., Disp: , Rfl:     metFORMIN  mg 24 hr tablet, Take 2 tablets (1,000 mg) by mouth once daily in the evening. Take with meals., Disp: 180 tablet, Rfl: 1    valsartan (Diovan) 320 mg tablet, Take 1 tablet (320 mg) by mouth once daily., Disp: 90 tablet, Rfl: 1    Objective   /80 (BP Location: Left arm, Patient Position: Sitting, BP Cuff Size: Adult)   Pulse 73   Resp 16   Ht 1.626 m (5' 4\")   Wt 141 kg (311 lb 9.6 oz)   SpO2 96%   BMI 53.49 kg/m²     Physical Exam  Vitals reviewed.   Cardiovascular:      Rate and Rhythm: Normal rate.   Pulmonary:      Effort: Pulmonary effort is normal.   Musculoskeletal:         General: Normal range of motion.   Feet:      Comments: DM foot exam done negative exam  Neurological:      General: No focal deficit present.      Mental Status: She is alert.   Psychiatric:         Mood and Affect: Mood normal.         Assessment/Plan   Diagnoses and all orders for this visit:  Type 2 diabetes mellitus without complication, without long-term current use of insulin (CMS/[facility])  -     POCT glycosylated hemoglobin (Hb A1C) manually resulted  -     metFORMIN  mg 24 hr tablet; Take 2 tablets (1,000 mg) by mouth once daily in the evening. Take with meals.  Obesity, morbid, BMI 50 or higher (CMS/HCC)  Mixed hyperlipidemia  Benign essential hypertension  -     valsartan (Diovan) 320 mg tablet; Take 1 tablet (320 mg) by mouth once daily.  Obstructive sleep apnea syndrome  -     Referral to Adult Sleep Medicine; Future  RLS (restless legs syndrome)  -     Referral to Adult " Sleep Medicine; Future  Feet:  socks removed, no foot pain reported, no deformities, ulcers, calluses and sensitive to 10 GM monofilament  Decrease intake of saturated fats, fast food, sweets.  Increase intake of fresh fruit fresh vegetables and lean meats.  Increase healthy fats seeds, nuts, olive oil instead of butter.  walk 150 minutes/week for heart health.  Decrease intake of processed foods, fast foods, lunch meat, canned soups, canned veggies.  Increase intake of fresh fruits, veggies, and lean meats. Monitor blood pressure at home, keep a log and bring this with you to your next appointment. Call the office if your blood pressure runs 150/90 or higher consistently.  Sit quietly in a chair for 5 minutes with back supported and feet on the floor  Then place left arm on a table or armrest so bicep area is at the same level of heart or left breast  Check three times in a row, disregard the highest reading and average the other two    I have placed a referral to sleep medicine for further management.  Continue current medication.  Continue work on diet - recommend lots of fruits and vegetables, lean protein like chicken, turkey, fish, beans and Greek yogurt. Try to choose healthier carbohydrate options like oatmeal, wheat bread and pasta, sweet potatoes. Limit sugary treats.  Check a fasting sugar first thing in the AM twice daily and keep a log of the results to bring to your next office visit.  Please contact office if your sugars are consistently >140.  Reevaluate in 3 months.   *This note was dictated using DRAGON speech recognition software and was corrected for spelling or grammatical errors, but despite proofreading several typographical errors might be present that might affect the meaning of the content.*  Rachel Savage, CNP

## 2024-02-20 NOTE — PATIENT INSTRUCTIONS
Decrease intake of saturated fats, fast food, sweets.  Increase intake of fresh fruit fresh vegetables and lean meats.  Increase healthy fats seeds, nuts, olive oil instead of butter.  walk 150 minutes/week for heart health.  Decrease intake of processed foods, fast foods, lunch meat, canned soups, canned veggies.  Increase intake of fresh fruits, veggies, and lean meats. Monitor blood pressure at home, keep a log and bring this with you to your next appointment. Call the office if your blood pressure runs 150/90 or higher consistently.  Sit quietly in a chair for 5 minutes with back supported and feet on the floor  Then place left arm on a table or armrest so bicep area is at the same level of heart or left breast  Check three times in a row, disregard the highest reading and average the other two    I have placed a referral to sleep medicine for further management.  Continue current medication.  Continue work on diet - recommend lots of fruits and vegetables, lean protein like chicken, turkey, fish, beans and Greek yogurt. Try to choose healthier carbohydrate options like oatmeal, wheat bread and pasta, sweet potatoes. Limit sugary treats.  Check a fasting sugar first thing in the AM twice daily and keep a log of the results to bring to your next office visit.  Please contact office if your sugars are consistently >140.  Reevaluate in 3 months.

## 2024-03-04 ENCOUNTER — OFFICE VISIT (OUTPATIENT)
Dept: SLEEP MEDICINE | Facility: CLINIC | Age: 54
End: 2024-03-04
Payer: COMMERCIAL

## 2024-03-04 VITALS
DIASTOLIC BLOOD PRESSURE: 84 MMHG | WEIGHT: 293 LBS | HEIGHT: 64 IN | OXYGEN SATURATION: 97 % | HEART RATE: 79 BPM | BODY MASS INDEX: 50.02 KG/M2 | SYSTOLIC BLOOD PRESSURE: 130 MMHG

## 2024-03-04 DIAGNOSIS — G47.19 EXCESSIVE DAYTIME SLEEPINESS: Primary | ICD-10-CM

## 2024-03-04 DIAGNOSIS — G47.33 OBSTRUCTIVE SLEEP APNEA SYNDROME: ICD-10-CM

## 2024-03-04 DIAGNOSIS — E66.01 OBESITY, MORBID, BMI 50 OR HIGHER (MULTI): ICD-10-CM

## 2024-03-04 DIAGNOSIS — G25.81 RLS (RESTLESS LEGS SYNDROME): ICD-10-CM

## 2024-03-04 DIAGNOSIS — E83.10 DISORDER OF IRON METABOLISM: ICD-10-CM

## 2024-03-04 PROCEDURE — 3079F DIAST BP 80-89 MM HG: CPT | Performed by: INTERNAL MEDICINE

## 2024-03-04 PROCEDURE — 3008F BODY MASS INDEX DOCD: CPT | Performed by: INTERNAL MEDICINE

## 2024-03-04 PROCEDURE — 1036F TOBACCO NON-USER: CPT | Performed by: INTERNAL MEDICINE

## 2024-03-04 PROCEDURE — 4010F ACE/ARB THERAPY RXD/TAKEN: CPT | Performed by: INTERNAL MEDICINE

## 2024-03-04 PROCEDURE — 99204 OFFICE O/P NEW MOD 45 MIN: CPT | Performed by: INTERNAL MEDICINE

## 2024-03-04 PROCEDURE — 3075F SYST BP GE 130 - 139MM HG: CPT | Performed by: INTERNAL MEDICINE

## 2024-03-04 ASSESSMENT — SLEEP AND FATIGUE QUESTIONNAIRES
SITING INACTIVE IN A PUBLIC PLACE LIKE A CLASS ROOM OR A MOVIE THEATER: SLIGHT CHANCE OF DOZING
HOW LIKELY ARE YOU TO NOD OFF OR FALL ASLEEP IN A CAR, WHILE STOPPED FOR A FEW MINUTES IN TRAFFIC: WOULD NEVER DOZE
HOW LIKELY ARE YOU TO NOD OFF OR FALL ASLEEP WHILE SITTING AND TALKING TO SOMEONE: WOULD NEVER DOZE
WAKING_TOO_EARLY: MODERATE
ESS-CHAD TOTAL SCORE: 12
HOW LIKELY ARE YOU TO NOD OFF OR FALL ASLEEP WHILE WATCHING TV: HIGH CHANCE OF DOZING
WORRIED_DISTRESSED_DUE_TO_SLEEP: MUCH
HOW LIKELY ARE YOU TO NOD OFF OR FALL ASLEEP WHILE LYING DOWN TO REST IN THE AFTERNOON WHEN CIRCUMSTANCES PERMIT: HIGH CHANCE OF DOZING
HOW LIKELY ARE YOU TO NOD OFF OR FALL ASLEEP WHILE SITTING AND READING: HIGH CHANCE OF DOZING
SATISFACTION_WITH_CURRENT_SLEEP_PATTERN: DISSATISFIED
HOW LIKELY ARE YOU TO NOD OFF OR FALL ASLEEP WHEN YOU ARE A PASSENGER IN A CAR FOR AN HOUR WITHOUT A BREAK: MODERATE CHANCE OF DOZING
SLEEP_PROBLEM_NOTICEABLE_TO_OTHERS: VERY MUCH NOTICEABLE
SLEEP_PROBLEM_INTERFERES_DAILY_ACTIVITIES: MUCH
HOW LIKELY ARE YOU TO NOD OFF OR FALL ASLEEP WHILE SITTING QUIETLY AFTER LUNCH WITHOUT ALCOHOL: WOULD NEVER DOZE
DIFFICULTY_STAYING_ASLEEP: MILD

## 2024-03-04 ASSESSMENT — PAIN SCALES - GENERAL: PAINLEVEL: 0-NO PAIN

## 2024-03-04 NOTE — ASSESSMENT & PLAN NOTE
She is unable to tolerate current pressures.  She did not bring her machine with her.  Needs retitration with a mask fitting.  I did recommend that she try lip tape to prevent mouth breathing.  She did not benefit from a chinstrap.  Recommended titration in lab.  Recommend trial of nasal mask with lip tape.  Patient was instructed to bring lip tape with her

## 2024-03-04 NOTE — PROGRESS NOTES
Subjective   Patient ID: Vilma Grover is a 53 y.o. female who presents for a sleep evaluation with concerns of Restless Legs, Sleep Apnea, Pap Intolerance, and Breathing Problem.  HPI     Prior sleep study results:       Prior sleep history:  6/9/2016: Diagnostic polysomnography: AHI 12.1, REM AHI 42.9.  Increase periodic limb movements with PLMI 21.8 with PLM arousal index of 3.1.  10/1/2018: PAP titration study: CPAP 18 cm H2O.  On my own review CPAP 13 cm H2O appears to be sufficient based on PAP titration table.    Current sleep history:  She was previously diagnosed with sleep apnea  Had a replaced BillShrink moe device.  She stopped using her CPAP due to dry mouth, uses FFM  Tried nasal mask, and chin strap ineffective to prevent mouth breathing     Sleep schedule  on weekdays / work days:  Usual Bedtime 9 PM  Falls asleep around 9 PM  Wake time 4 to 5 AM  Total sleep time average is 7-8 hours/day  Naps  Yes in the afternoons around lunchtime for 1 hour  Refreshing naps:   No     Preferred sleeping position: Supine    Review of Systems    snoring, nocturia, grinding teeth, sore throat in the morning, dry mouth in the morning, and headaches in the morning  DAYTIME SYMPTOMS: reports and difficulty with memory or concentration during the day    SCREENING FOR SLEEP DISORDERS:    MOVEMENT DISORDER SYMPTOMS:    - Sensations: Patient has unusual sensations in their extremities that cause an urge to move them , - Frequency: at night when going to sleep and daily , - Relief: Symptoms ARE/ARENOT: are relieved with movement , - Circadian: Symptoms ARE/ARENOT: are typically improved later in the night. , - Movement: Patient has not been told that their legs kick or jerk during sleep    DENIES  dreams upon falling asleep  hypnagogic/hypnopompic hallucinations  sleep paralysis  symptoms of cataplexy  sleep walking  kicking, punching, or acting out dreams    ESS 12     History reviewed. No pertinent past medical  history.   Patient Active Problem List   Diagnosis    Accidental fall    Anxiety disorder    Benign essential hypertension    Benign paroxysmal positional vertigo    Chest pain    Depression    Fatigue    Female bladder prolapse    Hypercholesteremia    Hyperlipidemia    Hypertension    Knee pain    Obesity, morbid, BMI 50 or higher (CMS/HCC)    Shoulder pain    Strain of left rotator cuff capsule    Diabetes mellitus type 2 in obese (CMS/[facility])    Type 2 diabetes mellitus without complication, without long-term current use of insulin (CMS/[facility])    Vertigo    Dizziness    Obstructive sleep apnea syndrome    RLS (restless legs syndrome)    Excessive daytime sleepiness    Disorder of iron metabolism      Past Surgical History:   Procedure Laterality Date     SECTION, CLASSIC  10/23/2014     Section    OTHER SURGICAL HISTORY  10/23/2014    Bunion Correction By [place] Procedure        Current Outpatient Medications:     aspirin ([place] Aspirin) 81 mg EC tablet, Take 1 tablet (81 mg) by mouth once daily., Disp: , Rfl:     atorvastatin (Lipitor) 20 mg tablet, Take 1 tablet (20 mg) by mouth once daily., Disp: , Rfl:     calcium carbonate/vitamin D3 (CALCIUM 600 + D,3, ORAL), Calcium + D3, Disp: , Rfl:     citalopram (CeleXA) 40 mg tablet, Take 0.5 tablets (20 mg) by mouth once daily., Disp: 15 tablet, Rfl: 2    cyanocobalamin (Vitamin B-12) 1,000 mcg tablet, Take 100 mcg by mouth once daily. For 30 days, Disp: , Rfl:     meloxicam (Mobic) 15 mg tablet, Take 1 tablet (15 mg) by mouth once daily., Disp: , Rfl:     metFORMIN  mg 24 hr tablet, Take 2 tablets (1,000 mg) by mouth once daily in the evening. Take with meals., Disp: 180 tablet, Rfl: 1    MULTIVITAMIN ORAL, Take 1 tablet by mouth once daily. For 30 days, Disp: , Rfl:     NON FORMULARY, Tumeric 1 cap twice a day, Disp: , Rfl:     nystatin (Mycostatin) cream, APPLY TO THE AFFECTED AREA(S) TWICE DAILY, Disp: , Rfl:     Trulicity 4.5 mg/0.5 mL pen  "injector, Inject 4.5 mg under the skin 1 (one) time per week., Disp: , Rfl:     valsartan (Diovan) 320 mg tablet, Take 1 tablet (320 mg) by mouth once daily., Disp: 90 tablet, Rfl: 1   Allergies   Allergen Reactions    Erythromycin Base Unknown     Upset stomach    Lisinopril Cough      Social History     Socioeconomic History    Marital status:      Spouse name: Not on file    Number of children: Not on file    Years of education: Not on file    Highest education level: Not on file   Occupational History    Not on file   Tobacco Use    Smoking status: Former     Years: 5     Types: Cigarettes     Passive exposure: Past    Smokeless tobacco: Never   Vaping Use    Vaping Use: Never used   Substance and Sexual Activity    Alcohol use: Yes     Alcohol/week: 2.0 standard drinks of alcohol     Types: 2 Glasses of wine per week    Drug use: Never    Sexual activity: Not on file   Other Topics Concern    Not on file   Social History Narrative    Not on file     Social Determinants of Health     Financial Resource Strain: Not on file   Food Insecurity: Not on file   Transportation Needs: Not on file   Physical Activity: Not on file   Stress: Not on file   Social Connections: Not on file   Intimate Partner Violence: Not on file   Housing Stability: Not on file        Family History   Problem Relation Name Age of Onset    Dementia Mother      Coronary artery disease Mother      Heart attack Mother      Other (Uterine leiomyoma) Mother      Prostate cancer Father          in remission heart vave replacment in 2/2023    Anxiety disorder Brother      Depression Brother      Migraines Daughter      Other (bruises easily) Daughter           No results found for: \"IRON\", \"TIBC\", \"FERRITIN\"     Objective   /84   Pulse 79   Ht 1.626 m (5' 4\")   Wt 139 kg (307 lb)   SpO2 97%   BMI 52.70 kg/m²    PREVIOUS WEIGHTS:  Wt Readings from Last 3 Encounters:   03/04/24 139 kg (307 lb)   02/20/24 141 kg (311 lb 9.6 oz) "   01/11/24 139 kg (306 lb)     Physical Exam  PHYSICAL EXAM: GENERAL: alert pleasant and cooperative no acute distress  nasal mucosa , normal, and pink  MODIFIED ASCENCIO SCORE: III  MODIFIED MALLAMPATI SCORE: IV (only hard palate visible)  UVULA: midline  TONSILLAR SCORE: 1+  TONGUE SCALLOPING: midline  PSYCH EXAM: alert,oriented, in NAD with a full range of affect, normal behavior and no psychotic features  Assessment/Plan   Problem List Items Addressed This Visit             ICD-10-CM    Obesity, morbid, BMI 50 or higher (CMS/[facility]) E66.01     Weight loss is advised to help reduce severity of sleep apnea         Obstructive sleep apnea syndrome G47.33     She is unable to tolerate current pressures.  She did not bring her machine with her.  Needs retitration with a mask fitting.  I did recommend that she try lip tape to prevent mouth breathing.  She did not benefit from a chinstrap.  Recommended titration in lab.  Recommend trial of nasal mask with lip tape.  Patient was instructed to bring lip tape with her         RLS (restless legs syndrome) G25.81     RESTLESS LEG SYNDROME  -RLS education provided today in clinic.   -Avoid caffeine containing beverages, chocolate, nicotine and alcohol as these can make symptoms worse.   -You can try massage, exercise, stretching or warm baths before bed time.   -We will check your ferritin level (a measure of iron stores) and start iron supplementation x 4-6 months if your ferritin level is under 75.     SSRI can be contributing to RLS symptoms         Excessive daytime sleepiness - Primary G47.19     She is symptomatic since stopping her CPAP with difficulty with concentration and memory.  Need to get her back on CPAP therapy if able         Disorder of iron metabolism E83.10    Relevant Orders    Iron and TIBC    Ferritin

## 2024-03-04 NOTE — ASSESSMENT & PLAN NOTE
She is symptomatic since stopping her CPAP with difficulty with concentration and memory.  Need to get her back on CPAP therapy if able

## 2024-03-04 NOTE — ASSESSMENT & PLAN NOTE
RESTLESS LEG SYNDROME  -RLS education provided today in clinic.   -Avoid caffeine containing beverages, chocolate, nicotine and alcohol as these can make symptoms worse.   -You can try massage, exercise, stretching or warm baths before bed time.   -We will check your ferritin level (a measure of iron stores) and start iron supplementation x 4-6 months if your ferritin level is under 75.     SSRI can be contributing to RLS symptoms

## 2024-03-06 ENCOUNTER — DOCUMENTATION (OUTPATIENT)
Dept: PHYSICAL THERAPY | Facility: CLINIC | Age: 54
End: 2024-03-06
Payer: COMMERCIAL

## 2024-03-06 NOTE — PROGRESS NOTES
Physical Therapy    Discharge Summary    Name: Vilma Grover  MRN: [id number]  : 1970  Date: 24    Discharge Summary: PT    Discharge Information: Date of discharge 3/6/24, Date of last visit 1/10/24, Date of evaluation 1/10/24, Number of attended visits 1, Referred by Rachel Savage CNP, and Referred for [facility]PP[facility]    Therapy Summary: Pt attended initial eval but did not return for follow up visits, chart was held for 30 days without return, discharge at this time.     Discharge Status: self discharge      Rehab Discharge Reason: Failed to schedule and/or keep follow-up appointment(s)

## 2024-03-13 ENCOUNTER — DOCUMENTATION (OUTPATIENT)
Dept: PHYSICAL THERAPY | Facility: CLINIC | Age: 54
End: 2024-03-13
Payer: COMMERCIAL

## 2024-03-13 NOTE — PROGRESS NOTES
Physical Therapy    Discharge Summary    Name: Vilma Grover  MRN: [id number]  : 1970  Date: 24    Discharge Summary: PT    Discharge Information: Date of evaluation 2024 and Number of attended visits 1    Therapy Summary: Pt only attended initial eval    Discharge Status: Follow-up with PCP as needed     Rehab Discharge Reason: Failed to schedule and/or keep follow-up appointment(s)

## 2024-03-16 DIAGNOSIS — F41.1 GENERALIZED ANXIETY DISORDER: ICD-10-CM

## 2024-03-18 RX ORDER — CITALOPRAM 40 MG/1
40 TABLET, FILM COATED ORAL DAILY
Qty: 30 TABLET | Refills: 5 | Status: SHIPPED | OUTPATIENT
Start: 2024-03-18

## 2024-03-27 ENCOUNTER — TELEPHONE (OUTPATIENT)
Dept: SLEEP MEDICINE | Facility: CLINIC | Age: 54
End: 2024-03-27
Payer: COMMERCIAL

## 2024-03-27 DIAGNOSIS — G47.33 OBSTRUCTIVE SLEEP APNEA SYNDROME: Primary | ICD-10-CM

## 2024-03-27 NOTE — TELEPHONE ENCOUNTER
----- Message from Meena Lou sent at 3/25/2024 11:29 AM EDT -----  Regarding: sleep study  Patient phones asking about Sleep Study was seen in March do not see an order, please advise

## 2024-05-10 ENCOUNTER — HOSPITAL ENCOUNTER (EMERGENCY)
Facility: HOSPITAL | Age: 54
Discharge: HOME | End: 2024-05-10
Attending: EMERGENCY MEDICINE
Payer: COMMERCIAL

## 2024-05-10 ENCOUNTER — APPOINTMENT (OUTPATIENT)
Dept: CARDIOLOGY | Facility: HOSPITAL | Age: 54
End: 2024-05-10
Payer: COMMERCIAL

## 2024-05-10 ENCOUNTER — APPOINTMENT (OUTPATIENT)
Dept: RADIOLOGY | Facility: HOSPITAL | Age: 54
End: 2024-05-10
Payer: COMMERCIAL

## 2024-05-10 VITALS
HEIGHT: 64 IN | OXYGEN SATURATION: 99 % | WEIGHT: 293 LBS | TEMPERATURE: 97 F | RESPIRATION RATE: 16 BRPM | HEART RATE: 66 BPM | BODY MASS INDEX: 50.02 KG/M2 | SYSTOLIC BLOOD PRESSURE: 170 MMHG | DIASTOLIC BLOOD PRESSURE: 90 MMHG

## 2024-05-10 DIAGNOSIS — R10.84 GENERALIZED ABDOMINAL PAIN: Primary | ICD-10-CM

## 2024-05-10 LAB
ALBUMIN SERPL-MCNC: 3.6 G/DL (ref 3.5–5)
ALP BLD-CCNC: 93 U/L (ref 35–125)
ALT SERPL-CCNC: 20 U/L (ref 5–40)
ANION GAP SERPL CALC-SCNC: 10 MMOL/L
APPEARANCE UR: CLEAR
AST SERPL-CCNC: 17 U/L (ref 5–40)
ATRIAL RATE: 66 BPM
BASOPHILS # BLD AUTO: 0.03 X10*3/UL (ref 0–0.1)
BASOPHILS NFR BLD AUTO: 0.5 %
BILIRUB SERPL-MCNC: 0.2 MG/DL (ref 0.1–1.2)
BILIRUB UR STRIP.AUTO-MCNC: NEGATIVE MG/DL
BUN SERPL-MCNC: 9 MG/DL (ref 8–25)
CALCIUM SERPL-MCNC: 9.1 MG/DL (ref 8.5–10.4)
CHLORIDE SERPL-SCNC: 100 MMOL/L (ref 97–107)
CO2 SERPL-SCNC: 28 MMOL/L (ref 24–31)
COLOR UR: NORMAL
CREAT SERPL-MCNC: 0.7 MG/DL (ref 0.4–1.6)
EGFRCR SERPLBLD CKD-EPI 2021: >90 ML/MIN/1.73M*2
EOSINOPHIL # BLD AUTO: 0.1 X10*3/UL (ref 0–0.7)
EOSINOPHIL NFR BLD AUTO: 1.5 %
ERYTHROCYTE [DISTWIDTH] IN BLOOD BY AUTOMATED COUNT: 14.6 % (ref 11.5–14.5)
GLUCOSE SERPL-MCNC: 151 MG/DL (ref 65–99)
GLUCOSE UR STRIP.AUTO-MCNC: NORMAL MG/DL
HCG UR QL IA.RAPID: NEGATIVE
HCT VFR BLD AUTO: 42 % (ref 36–46)
HGB BLD-MCNC: 13.3 G/DL (ref 12–16)
IMM GRANULOCYTES # BLD AUTO: 0.02 X10*3/UL (ref 0–0.7)
IMM GRANULOCYTES NFR BLD AUTO: 0.3 % (ref 0–0.9)
KETONES UR STRIP.AUTO-MCNC: NEGATIVE MG/DL
LEUKOCYTE ESTERASE UR QL STRIP.AUTO: NEGATIVE
LIPASE SERPL-CCNC: 40 U/L (ref 16–63)
LYMPHOCYTES # BLD AUTO: 1.83 X10*3/UL (ref 1.2–4.8)
LYMPHOCYTES NFR BLD AUTO: 27.6 %
MCH RBC QN AUTO: 26.8 PG (ref 26–34)
MCHC RBC AUTO-ENTMCNC: 31.7 G/DL (ref 32–36)
MCV RBC AUTO: 85 FL (ref 80–100)
MONOCYTES # BLD AUTO: 0.31 X10*3/UL (ref 0.1–1)
MONOCYTES NFR BLD AUTO: 4.7 %
MUCOUS THREADS #/AREA URNS AUTO: NORMAL /LPF
NEUTROPHILS # BLD AUTO: 4.33 X10*3/UL (ref 1.2–7.7)
NEUTROPHILS NFR BLD AUTO: 65.4 %
NITRITE UR QL STRIP.AUTO: NEGATIVE
NRBC BLD-RTO: 0 /100 WBCS (ref 0–0)
P AXIS: 64 DEGREES
P OFFSET: 213 MS
P ONSET: 151 MS
PH UR STRIP.AUTO: 7 [PH]
PLATELET # BLD AUTO: 215 X10*3/UL (ref 150–450)
POTASSIUM SERPL-SCNC: 4.7 MMOL/L (ref 3.4–5.1)
PR INTERVAL: 138 MS
PROT SERPL-MCNC: 6.6 G/DL (ref 5.9–7.9)
PROT UR STRIP.AUTO-MCNC: NORMAL MG/DL
Q ONSET: 220 MS
QRS COUNT: 11 BEATS
QRS DURATION: 86 MS
QT INTERVAL: 422 MS
QTC CALCULATION(BAZETT): 442 MS
QTC FREDERICIA: 436 MS
R AXIS: 72 DEGREES
RBC # BLD AUTO: 4.96 X10*6/UL (ref 4–5.2)
RBC # UR STRIP.AUTO: NEGATIVE /UL
RBC #/AREA URNS AUTO: NORMAL /HPF
SODIUM SERPL-SCNC: 138 MMOL/L (ref 133–145)
SP GR UR STRIP.AUTO: 1.01
SQUAMOUS #/AREA URNS AUTO: NORMAL /HPF
T AXIS: 52 DEGREES
T OFFSET: 431 MS
TROPONIN T SERPL-MCNC: <6 NG/L
TROPONIN T SERPL-MCNC: <6 NG/L
UROBILINOGEN UR STRIP.AUTO-MCNC: NORMAL MG/DL
VENTRICULAR RATE: 66 BPM
WBC # BLD AUTO: 6.6 X10*3/UL (ref 4.4–11.3)
WBC #/AREA URNS AUTO: NORMAL /HPF

## 2024-05-10 PROCEDURE — 96361 HYDRATE IV INFUSION ADD-ON: CPT

## 2024-05-10 PROCEDURE — 36415 COLL VENOUS BLD VENIPUNCTURE: CPT | Performed by: EMERGENCY MEDICINE

## 2024-05-10 PROCEDURE — 83690 ASSAY OF LIPASE: CPT | Performed by: EMERGENCY MEDICINE

## 2024-05-10 PROCEDURE — 74177 CT ABD & PELVIS W/CONTRAST: CPT

## 2024-05-10 PROCEDURE — 81001 URINALYSIS AUTO W/SCOPE: CPT | Performed by: EMERGENCY MEDICINE

## 2024-05-10 PROCEDURE — 74177 CT ABD & PELVIS W/CONTRAST: CPT | Performed by: RADIOLOGY

## 2024-05-10 PROCEDURE — 2550000001 HC RX 255 CONTRASTS: Performed by: EMERGENCY MEDICINE

## 2024-05-10 PROCEDURE — 96375 TX/PRO/DX INJ NEW DRUG ADDON: CPT

## 2024-05-10 PROCEDURE — 2500000004 HC RX 250 GENERAL PHARMACY W/ HCPCS (ALT 636 FOR OP/ED): Performed by: EMERGENCY MEDICINE

## 2024-05-10 PROCEDURE — 93005 ELECTROCARDIOGRAM TRACING: CPT

## 2024-05-10 PROCEDURE — 99285 EMERGENCY DEPT VISIT HI MDM: CPT | Mod: 25

## 2024-05-10 PROCEDURE — 81025 URINE PREGNANCY TEST: CPT | Performed by: EMERGENCY MEDICINE

## 2024-05-10 PROCEDURE — 84484 ASSAY OF TROPONIN QUANT: CPT | Performed by: EMERGENCY MEDICINE

## 2024-05-10 PROCEDURE — 80053 COMPREHEN METABOLIC PANEL: CPT | Performed by: EMERGENCY MEDICINE

## 2024-05-10 PROCEDURE — 96374 THER/PROPH/DIAG INJ IV PUSH: CPT

## 2024-05-10 PROCEDURE — 85025 COMPLETE CBC W/AUTO DIFF WBC: CPT | Performed by: EMERGENCY MEDICINE

## 2024-05-10 RX ORDER — ONDANSETRON HYDROCHLORIDE 2 MG/ML
4 INJECTION, SOLUTION INTRAVENOUS ONCE
Status: COMPLETED | OUTPATIENT
Start: 2024-05-10 | End: 2024-05-10

## 2024-05-10 RX ORDER — FAMOTIDINE 10 MG/ML
20 INJECTION INTRAVENOUS ONCE
Status: COMPLETED | OUTPATIENT
Start: 2024-05-10 | End: 2024-05-10

## 2024-05-10 RX ORDER — KETOROLAC TROMETHAMINE 30 MG/ML
15 INJECTION, SOLUTION INTRAMUSCULAR; INTRAVENOUS ONCE
Status: COMPLETED | OUTPATIENT
Start: 2024-05-10 | End: 2024-05-10

## 2024-05-10 RX ADMIN — SODIUM CHLORIDE 1000 ML: 900 INJECTION, SOLUTION INTRAVENOUS at 09:44

## 2024-05-10 RX ADMIN — IOHEXOL 75 ML: 350 INJECTION, SOLUTION INTRAVENOUS at 11:09

## 2024-05-10 RX ADMIN — FAMOTIDINE 20 MG: 10 INJECTION INTRAVENOUS at 09:45

## 2024-05-10 RX ADMIN — KETOROLAC TROMETHAMINE 15 MG: 30 INJECTION, SOLUTION INTRAMUSCULAR at 09:45

## 2024-05-10 RX ADMIN — ONDANSETRON 4 MG: 2 INJECTION INTRAMUSCULAR; INTRAVENOUS at 09:45

## 2024-05-10 ASSESSMENT — LIFESTYLE VARIABLES
TOTAL SCORE: 0
HAVE YOU EVER FELT YOU SHOULD CUT DOWN ON YOUR DRINKING: NO
EVER HAD A DRINK FIRST THING IN THE MORNING TO STEADY YOUR NERVES TO GET RID OF A HANGOVER: NO
EVER FELT BAD OR GUILTY ABOUT YOUR DRINKING: NO
HAVE PEOPLE ANNOYED YOU BY CRITICIZING YOUR DRINKING: NO

## 2024-05-10 ASSESSMENT — PAIN SCALES - GENERAL
PAINLEVEL_OUTOF10: 2
PAINLEVEL_OUTOF10: 6
PAINLEVEL_OUTOF10: 7

## 2024-05-10 ASSESSMENT — PAIN DESCRIPTION - LOCATION
LOCATION: ABDOMEN
LOCATION: ABDOMEN

## 2024-05-10 ASSESSMENT — PAIN DESCRIPTION - PAIN TYPE
TYPE: ACUTE PAIN
TYPE: ACUTE PAIN

## 2024-05-10 ASSESSMENT — PAIN DESCRIPTION - DESCRIPTORS: DESCRIPTORS: ACHING

## 2024-05-10 ASSESSMENT — PAIN - FUNCTIONAL ASSESSMENT
PAIN_FUNCTIONAL_ASSESSMENT: 0-10
PAIN_FUNCTIONAL_ASSESSMENT: 0-10

## 2024-05-10 ASSESSMENT — PAIN DESCRIPTION - PROGRESSION: CLINICAL_PROGRESSION: NOT CHANGED

## 2024-05-10 NOTE — ED PROVIDER NOTES
HPI   Chief Complaint   Patient presents with    Abdominal Pain       HPI  Patient is a 53-year-old female who presents to ED for worsening lower abdominal pain x 1 month.  Patient complains of constant, dull aching, nonradiating, pelvic pain.  She denies any associated nausea vomiting or diarrhea although she is a little nauseous this morning.  She denies any diet changes.  Denies any recent medication changes.  Denies any fever or chills, cough or congestion, chest pain or shortness of breath, headache or dizziness, urinary symptoms.                  No data recorded                   Patient History   No past medical history on file.  Past Surgical History:   Procedure Laterality Date     SECTION, CLASSIC  10/23/2014     Section    OTHER SURGICAL HISTORY  10/23/2014    Bunion Correction By [place] Procedure     Family History   Problem Relation Name Age of Onset    Dementia Mother      Coronary artery disease Mother      Heart attack Mother      Other (Uterine leiomyoma) Mother      Prostate cancer Father          in remission heart vave replacment in 2023    Anxiety disorder Brother      Depression Brother      Migraines Daughter      Other (bruises easily) Daughter       Social History     Tobacco Use    Smoking status: Former     Types: Cigarettes     Passive exposure: Past    Smokeless tobacco: Never   Vaping Use    Vaping status: Never Used   Substance Use Topics    Alcohol use: Yes     Alcohol/week: 2.0 standard drinks of alcohol     Types: 2 Glasses of wine per week    Drug use: Never       Physical Exam   ED Triage Vitals [05/10/24 0817]   Temperature Heart Rate Respirations BP   36.1 °C (97 °F) 78 18 (!) 149/103      Pulse Ox Temp src Heart Rate Source Patient Position   98 % -- -- Sitting      BP Location FiO2 (%)     Right arm --       Physical Exam  Vitals reviewed.   Constitutional:       Appearance: Normal appearance.   HENT:      Head: Atraumatic.   Eyes:      Extraocular Movements:  Extraocular movements intact.   Cardiovascular:      Rate and Rhythm: Normal rate and regular rhythm.   Pulmonary:      Effort: Pulmonary effort is normal.      Breath sounds: Normal breath sounds.   Abdominal:      General: Abdomen is flat.      Palpations: Abdomen is soft.      Tenderness: There is no abdominal tenderness.   Musculoskeletal:         General: Normal range of motion.      Cervical back: Neck supple.   Skin:     General: Skin is warm and dry.   Neurological:      General: No focal deficit present.      Mental Status: She is alert and oriented to person, place, and time.   Psychiatric:         Mood and Affect: Mood normal.         Behavior: Behavior normal.         ED Course & MDM   Diagnoses as of 05/10/24 1311   Generalized abdominal pain       Medical Decision Making  Parts of this chart have been completed using voice recognition software. Please excuse any errors of transcription.  My thought process and reason for plan has been formulated from the time that I saw the patient until the time of disposition and is not specific to one specific moment during their visit and furthermore my MDM encompasses this entire chart and not only this text box.    HPI:   Detailed above.    Exam:   A medically appropriate exam performed, outlined above, given the known history and presentation.    History obtained from:   Patient    EKG/Cardiac monitor:       Social Determinants of Health considered during this visit:       Medications given during visit:  Medications   famotidine PF (Pepcid) injection 20 mg (20 mg intravenous Given 5/10/24 0945)   ondansetron (Zofran) injection 4 mg (4 mg intravenous Given 5/10/24 0945)   ketorolac (Toradol) injection 15 mg (15 mg intravenous Given 5/10/24 0945)   sodium chloride 0.9 % bolus 1,000 mL (0 mL intravenous Stopped 5/10/24 1044)   iohexol (OMNIPaque) 350 mg iodine/mL solution 75 mL (75 mL intravenous Given 5/10/24 1109)        Diagnostic/tests:  Labs Reviewed   CBC  WITH AUTO DIFFERENTIAL - Abnormal       Result Value    WBC 6.6      nRBC 0.0      RBC 4.96      Hemoglobin 13.3      Hematocrit 42.0      MCV 85      MCH 26.8      MCHC 31.7 (*)     RDW 14.6 (*)     Platelets 215      Neutrophils % 65.4      Immature Granulocytes %, Automated 0.3      Lymphocytes % 27.6      Monocytes % 4.7      Eosinophils % 1.5      Basophils % 0.5      Neutrophils Absolute 4.33      Immature Granulocytes Absolute, Automated 0.02      Lymphocytes Absolute 1.83      Monocytes Absolute 0.31      Eosinophils Absolute 0.10      Basophils Absolute 0.03     COMPREHENSIVE METABOLIC PANEL - Abnormal    Glucose 151 (*)     Sodium 138      Potassium 4.7      Chloride 100      Bicarbonate 28      Urea Nitrogen 9      Creatinine 0.70      eGFR >90      Calcium 9.1      Albumin 3.6      Alkaline Phosphatase 93      Total Protein 6.6      AST 17      Bilirubin, Total 0.2      ALT 20      Anion Gap 10     HCG, URINE, QUALITATIVE - Normal    HCG, Urine NEGATIVE     LIPASE - Normal    Lipase 40     SERIAL TROPONIN, INITIAL ([facility]) - Normal    Troponin T, High Sensitivity <6     URINALYSIS WITH REFLEX CULTURE AND MICROSCOPIC - Normal    Color, Urine Light-Yellow      Appearance, Urine Clear      Specific Gravity, Urine 1.015      pH, Urine 7.0      Protein, Urine 10 (TRACE)      Glucose, Urine Normal      Blood, Urine NEGATIVE      Ketones, Urine NEGATIVE      Bilirubin, Urine NEGATIVE      Urobilinogen, Urine Normal      Nitrite, Urine NEGATIVE      Leukocyte Esterase, Urine NEGATIVE     SERIAL TROPONIN,  2 HOUR ([facility]) - Normal    Troponin T, High Sensitivity <6     TROPONIN T SERIES, HIGH SENSITIVITY (0, 2 HR, 6 HR)    Narrative:     The following orders were created for panel order Troponin T Series, High Sensitivity (0, 2HR, 6HR).  Procedure                               Abnormality         Status                     ---------                               -----------         ------                     Serial  Troponin, Initial...[[id number]]  Normal              Final result               Serial Troponin, 2 Hour ...[[id number]]  Normal              Final result               Serial Troponin, 6 Hour ...[[id number]]                                                   Please view results for these tests on the individual orders.   URINALYSIS WITH REFLEX CULTURE AND MICROSCOPIC    Narrative:     The following orders were created for panel order Urinalysis with Reflex Culture and Microscopic.  Procedure                               Abnormality         Status                     ---------                               -----------         ------                     Urinalysis with Reflex C...[[id number]]  Normal              Final result               Extra Urine Gray Tube[[id number]]                                                         Please view results for these tests on the individual orders.   EXTRA URINE GRAY TUBE   SERIAL TROPONIN, 6 HOUR ([facility])   URINALYSIS MICROSCOPIC WITH REFLEX CULTURE    WBC, Urine NONE      RBC, Urine NONE      Squamous Epithelial Cells, Urine 10-25 (FEW)      Mucus, Urine FEW        CT abdomen pelvis w IV contrast   Final Result   1.  There is a 6.2 x 6.4 x 7.1 cm fat containing mass seen within   cervix extending into lower uterine segment most likely lipoma or   lipoleiomyoma.   2. Mild diverticulosis of the colon without diverticulitis.   3. Postoperative change from appendectomy.   4. Fatty liver.             MACRO:   None        Signed by: Tasha Laura 5/10/2024 11:57 AM   Dictation workstation:   [id number]           Differential Diagnosis:   As I have deemed necessary from their history, physical and studies, I have considered the following diagnoses:     ABDOMINAL AORTIC ANEURYSM  ACUTE APPENDICITIS  BOWEL OBSTRUCTION  CHOLECYSTITIS  DESCENDING AORTIC DISSECTION  DIVERTICULITIS  INTRABDOMINAL ABSCESS  INCARCERATED/STRANGULATED HERNIA  INTERNAL BLEEDING  MESENTERIC  ISCHEMIA  PANCREATITIS  PERFORATED BOWEL or ULCER  RUPTURED OVARIAN CYST  SPLENIC INJURY  SPONTANEOUS BACTERIAL PERITONITIS ([facility])  TOXIC MEGACOLON  TOXIC SHOCK SYNDROME  URINARY SEPSIS    LOW RISK ABDOMINAL PAIN:  I completed a structured, evidence-based clinical evaluation and testing for abdominal pain. The workup included a CT unless the patient met a set of criteria or the patient declined the CT. The evidence indicates that the patient is very low risk for any acute abdominal emergency, and this is consistent with my clinical intuition. The risk of further imaging or hospitalization is likely higher than the risk of the patient having an acute emergent condition related to today´s symptoms. It is, therefore, in the patient´s best interest not to do additional emergent testing or be hospitalized. I have discussed with the patient my clinical impression and the result of an evidence-based clinical evaluation to screen for an acute abdominal emergency, as well as the risk of further testing and hospitalization. The evidence shows that the risk for an acute condition related to today´s symptoms and signs is extremely low. Although the risk of progression or new symptoms cannot be excluded, the risks of further testing or hospitalization likely exceed the benefit, and the patient declines further emergent evaluation or hospitalization for evaluation of the abdominal pain.      Consultations:      Procedures:      Critical Care:      Referrals:      Discharge Prescriptions:      MDM Summary:  Patient has lipoma and cervix, it is possible that this may be the source of her pain.  Will refer patient to OB/GYN for further management and evaluation.  Patient's lab work is unremarkable.  There is no leukocytosis or anemia, electrolyte abnormality or acute kidney injury.  Troponin is within normal limits and flat.  Lipase is within normal limits.  Urinalysis shows no evidence of UTI.    We have discussed the diagnosis and  risks, and we agree with discharging home to follow-up with appropriate physician as directed. We also discussed returning to the Emergency Department immediately if new or worsening symptoms occur. We have discussed the symptoms which are most concerning that necessitate immediate return. Pt symptoms have been well controlled here and the patient is safe for discharge with appropriate outpatient follow up. The patient has verbalized understanding to return to ER without delay for new or worsening pains or for any other symptoms or concerns. I utilized the discharge clinical management tool provided [employer] to help estimate risk of negative outcome for this patient.      Disposition:  The patient was discharged      Procedure  Procedures     Gary Cruz PA-C  05/10/24 [id number]

## 2024-05-10 NOTE — PROGRESS NOTES
Attestation/Supervisory note for JERICHO Cruz      The patient is a 53-year-old female presenting to the emergency department for evaluation of diffuse lower abdominal pain with nausea and generalized malaise over the past several weeks.  She states that the pain is a dull aching pain.  No better or worse.  No radiation.  It is fairly constant.  She denies any headache or visual changes.  No focal weakness or numbness.  No chest pain or shortness of breath.  No fever or chills.  No back pain.  No urinary complaints.  No vaginal discharge.  No diarrhea or constipation.  She states that she has had intermittent nausea without vomiting.  She states that she does have a history of 3 previous C-sections in the 1990s and had a previous appendectomy but no other abdominal or pelvic surgeries.  No sick contacts or recent travel.  All pertinent positives and negatives are recorded above.  All other systems reviewed and otherwise negative.  Vital signs with diastolic hypertension but otherwise within normal limits.  Physical exam with a well-nourished well-developed female in no acute distress.  HEENT exam within normal limits.  She has no evidence of airway compromise or respiratory distress.  She reports diffuse lower abdominal tenderness to palpation.  No rebound or guarding.  No palpable masses.  No flank pain with percussion or palpation.  She is able to converse without difficulty.  She is able to walk and stand without difficulty.      EKG with normal sinus rhythm at 66 bpm, normal axis, normal voltage, normal ST segment, normal T waves      IV fluids, IV Pepcid, IV Zofran and IV Toradol ordered.      Diagnostic labs without significant abnormality      Initial Troponin T <  6.  Repeat  trop T < 6      CT abdomen pelvis w IV contrast   Final Result   1.  There is a 6.2 x 6.4 x 7.1 cm fat containing mass seen within   cervix extending into lower uterine segment most likely lipoma or   lipoleiomyoma.   2. Mild diverticulosis  of the colon without diverticulitis.   3. Postoperative change from appendectomy.   4. Fatty liver.             MACRO:   None        Signed by: Tasha Laura 5/10/2024 11:57 AM   Dictation workstation:   [id number]           The patient does not have any evidence of hemodynamic instability.  She has a nonsurgical abdominal exam but does report persistent pain.  The diagnostic labs show no significant abnormality.  The patient does not have any evidence of ischemia on EKG or cardiac enzymes making her symptoms as an anginal equivalent unlikely.  The patient does have a uterine fibroid on CT abdomen pelvis but no evidence of acute process such as diverticulitis, appendicitis, pancreatitis, or cholecystitis.  No evidence of ureteral stone.      The patient was released in good condition.  She was instructed to follow-up with your primary care physician within 1 to 2 days for further management of her hypertension and was given a referral to OB/GYN for further management of her uterine fibroid.  She will return to the emergency department sooner with worsening of symptoms or onset of new symptoms.  She will take over-the-counter acetaminophen and/or ibuprofen as needed for symptom relief.  She will follow dosing instructions on the packages.        Impression/diagnosis  Abdominal pain, diffuse lower  Nausea without vomiting  Diastolic hypertension  Uterine fibroid      I personally saw the patient and made/approve the management plan and take responsibility for the patient management.      I independently interpreted the following study (S): EKG and diagnostic labs      I personally discussed the patient's management with the patient      I reviewed the results of the diagnostic labs and diagnostic imaging.  Formal radiology read was completed by the radiologist.      Waleska Thibodeaux MD

## 2024-05-10 NOTE — ED NOTES
Pt arrived with complaints of abdominal pain/ bladder pain x 2 weeks. She said it feels like a pressure/discomfort and she has severe bloating.      Breanna Ervin, EMT  05/10/24 [id number]

## 2024-05-13 ENCOUNTER — TELEPHONE (OUTPATIENT)
Dept: OBSTETRICS AND GYNECOLOGY | Facility: CLINIC | Age: 54
End: 2024-05-13
Payer: COMMERCIAL

## 2024-05-13 NOTE — TELEPHONE ENCOUNTER
NP to practice / pt has been seeing pcp for gyn care /  Pt states that she went to ER on Friday due to past 2 weeks feeling bloating / pelvic pressure / feeling that she needs to urinate frequently / pt was thinking that she had uti / Pt states she had CT scan / (see CT scan )Pt was advised that she has a fatty mass on her cervix and needs to see gyn / pt denies any bleeding /advised message to Dr No / pt aware Dr No seeing pt throughout day / office will return call with Dr Hagen recommendation .

## 2024-05-14 ENCOUNTER — TELEPHONE (OUTPATIENT)
Dept: PRIMARY CARE | Facility: CLINIC | Age: 54
End: 2024-05-14
Payer: COMMERCIAL

## 2024-05-14 NOTE — TELEPHONE ENCOUNTER
Patient called and said that she went to the ER on Friday d/t abdominal pain. They did a CT scan and a mass was noted on her cervix. She has an appointment with Dr. Catina No on 06/11/24. She wants to know if you need to see her of if she just needs to follow-up with gyn? If Catina No is a good doctor? If waiting until 06/11/24 will be ok?

## 2024-05-14 NOTE — TELEPHONE ENCOUNTER
I spoke with the patient and let her know the message. She said that the June appointment was squeezing her in as she is booked out until November. I advised that the other thing she could do would be to call around other OB/Gyn offices and see if she can get in sooner with them. She said that she will stick with Dr. No since she is highly recommended.

## 2024-05-16 ENCOUNTER — PATIENT MESSAGE (OUTPATIENT)
Dept: PRIMARY CARE | Facility: CLINIC | Age: 54
End: 2024-05-16
Payer: COMMERCIAL

## 2024-05-16 DIAGNOSIS — E11.9 TYPE 2 DIABETES MELLITUS WITHOUT COMPLICATION, WITHOUT LONG-TERM CURRENT USE OF INSULIN (MULTI): Primary | ICD-10-CM

## 2024-05-16 NOTE — ADDENDUM NOTE
Addended by: CHRISTOPHE SHAW on: 5/16/2024 12:01 PM     Modules accepted: Orders     ISSUE:   Cyclosporine level 126 on 8/8/2022, goal , dose 75 mg in the AM and 50 mg in the PM     PLAN:   Please call patient and confirm this was an accurate 12-hour trough. Verify Cyclosporine dose 75 mg / 50 mg BID. Confirm no new medications or illness. Confirm no missed doses. If accurate trough and accurate dose, decrease Cyclosporine dose to 50 mg BID and repeat labs in 2 weeks    OUTCOME:   Spoke with [facility] DARNELL, they confirm accurate trough level and current dose 75 mg / 50 mg BID. Patient confirmed dose change to 50 mg BID and to repeat labs in 2 weeks. Orders sent to preferred pharmacy for dose change and lab for repeat labs. Patient voiced understanding of plan.     Brianna Soares RN   Transplant Coordinator  [phone]

## 2024-05-21 ENCOUNTER — TELEPHONE (OUTPATIENT)
Dept: OBSTETRICS AND GYNECOLOGY | Facility: CLINIC | Age: 54
End: 2024-05-21
Payer: COMMERCIAL

## 2024-05-21 NOTE — TELEPHONE ENCOUNTER
NP to practice has NP appt for cervical fatty mass 06/11/2024 / Pt calling states that she seen her cardiologist this AM and he advised her to call to see if sooner appt / Pt also states that this morning had pink blood in toilet and on tissue  / advised message to Dr No / ( no sooner 30 min appts available  please advise )

## 2024-05-21 NOTE — TELEPHONE ENCOUNTER
Spoke to pt advised of Dr Hagen message / pt states will wait for Dr No / advised if bleeding would become heavy  and saturating a heavy overnight pad within an hour to go to ER / pt agrees

## 2024-05-23 ENCOUNTER — APPOINTMENT (OUTPATIENT)
Dept: SLEEP MEDICINE | Facility: HOSPITAL | Age: 54
End: 2024-05-23
Payer: COMMERCIAL

## 2024-05-23 ASSESSMENT — ENCOUNTER SYMPTOMS
HEMATOCHEZIA: 0
ANOREXIA: 0
BELCHING: 1
WEIGHT LOSS: 0
ABDOMINAL PAIN: 1
DYSURIA: 0
FREQUENCY: 1
FLATUS: 1
VOMITING: 0

## 2024-05-24 ENCOUNTER — OFFICE VISIT (OUTPATIENT)
Dept: OBSTETRICS AND GYNECOLOGY | Facility: CLINIC | Age: 54
End: 2024-05-24
Payer: COMMERCIAL

## 2024-05-24 ENCOUNTER — LAB (OUTPATIENT)
Dept: LAB | Facility: LAB | Age: 54
End: 2024-05-24
Payer: COMMERCIAL

## 2024-05-24 VITALS
WEIGHT: 293 LBS | BODY MASS INDEX: 50.02 KG/M2 | DIASTOLIC BLOOD PRESSURE: 90 MMHG | SYSTOLIC BLOOD PRESSURE: 130 MMHG | HEIGHT: 64 IN

## 2024-05-24 DIAGNOSIS — N92.6 IRREGULAR MENSES: ICD-10-CM

## 2024-05-24 DIAGNOSIS — D21.9 FIBROID: ICD-10-CM

## 2024-05-24 DIAGNOSIS — N88.8 CERVICAL MASS: Primary | ICD-10-CM

## 2024-05-24 PROCEDURE — 3080F DIAST BP >= 90 MM HG: CPT | Performed by: OBSTETRICS & GYNECOLOGY

## 2024-05-24 PROCEDURE — 3075F SYST BP GE 130 - 139MM HG: CPT | Performed by: OBSTETRICS & GYNECOLOGY

## 2024-05-24 PROCEDURE — 36415 COLL VENOUS BLD VENIPUNCTURE: CPT

## 2024-05-24 PROCEDURE — 83001 ASSAY OF GONADOTROPIN (FSH): CPT

## 2024-05-24 PROCEDURE — 1036F TOBACCO NON-USER: CPT | Performed by: OBSTETRICS & GYNECOLOGY

## 2024-05-24 PROCEDURE — 4010F ACE/ARB THERAPY RXD/TAKEN: CPT | Performed by: OBSTETRICS & GYNECOLOGY

## 2024-05-24 PROCEDURE — 99204 OFFICE O/P NEW MOD 45 MIN: CPT | Performed by: OBSTETRICS & GYNECOLOGY

## 2024-05-24 ASSESSMENT — ENCOUNTER SYMPTOMS
UNEXPECTED WEIGHT CHANGE: 0
ABDOMINAL PAIN: 1
ADENOPATHY: 0
DIZZINESS: 0
JOINT SWELLING: 0
FATIGUE: 0
LOSS OF SENSATION IN FEET: 0
VOMITING: 0
WEAKNESS: 0
ABDOMINAL DISTENTION: 0
DYSURIA: 0
HEADACHES: 0
DEPRESSION: 0
OCCASIONAL FEELINGS OF UNSTEADINESS: 0
CHEST TIGHTNESS: 0
SHORTNESS OF BREATH: 0
ACTIVITY CHANGE: 0
DIFFICULTY URINATING: 0
FREQUENCY: 1
COLOR CHANGE: 0

## 2024-05-24 ASSESSMENT — PATIENT HEALTH QUESTIONNAIRE - PHQ9
2. FEELING DOWN, DEPRESSED OR HOPELESS: NOT AT ALL
1. LITTLE INTEREST OR PLEASURE IN DOING THINGS: NOT AT ALL
SUM OF ALL RESPONSES TO PHQ9 QUESTIONS 1 & 2: 0

## 2024-05-24 ASSESSMENT — LIFESTYLE VARIABLES
AUDIT-C TOTAL SCORE: 2
HOW OFTEN DO YOU HAVE SIX OR MORE DRINKS ON ONE OCCASION: LESS THAN MONTHLY
SKIP TO QUESTIONS 9-10: 0
HOW MANY STANDARD DRINKS CONTAINING ALCOHOL DO YOU HAVE ON A TYPICAL DAY: 1 OR 2
HOW OFTEN DO YOU HAVE A DRINK CONTAINING ALCOHOL: MONTHLY OR LESS

## 2024-05-24 ASSESSMENT — PAIN SCALES - GENERAL: PAINLEVEL: 0-NO PAIN

## 2024-05-24 NOTE — PROGRESS NOTES
Subjective   Vilma Grover is a 53 y.o. female who is here for a cervical mass  Complaints:   denies vag bleed or discharge; denies pelvic  pain, pressure, or persistent bloating.  PMHx: BMI 53;  fatty liver/mild hepatomegaly; 7cm probable lipoma of cervix; prior appy; divertics; neg fluid or lad.  HX c/s x 3; hx egw  Last pap 10/12/2023 neg; last mamm 10/9/23      Past Medical History:   Diagnosis Date    Abnormal Pap smear of cervix     Colon polyps     Depression     Diabetes (Multi)     High cholesterol     Hypertension     Sleep apnea     Urinary tract infection     Varicella         Review of Systems   Constitutional:  Negative for activity change, fatigue and unexpected weight change.   Respiratory:  Negative for chest tightness and shortness of breath.    Cardiovascular:  Negative for chest pain and leg swelling.   Gastrointestinal:  Positive for abdominal pain. Negative for abdominal distention and vomiting.   Genitourinary:  Positive for frequency. Negative for difficulty urinating, dysuria, genital sores, pelvic pain, vaginal bleeding, vaginal discharge and vaginal pain.   Musculoskeletal:  Negative for gait problem and joint swelling.   Skin:  Negative for color change and rash.   Neurological:  Negative for dizziness, weakness and headaches.   Hematological:  Negative for adenopathy.   Objective   There were no vitals taken for this visit.   General:   Alert and oriented, in no acute distress   Neck:    Breast/Axilla:    Abdomen:    Vulva:  Blood-stained; Normal architecture without erythema, masses, or lesions.    Vagina: ON menses; vault cleared with swabs; NO visible lesions; sidewalls and post wall laxity/bulge into canal--no nodules/mass within walls   Cervix: Small nullip high in vault; NO visible or palpable lesion; no tenderness with palpation and movement.   Uterus: No gross enlargement or tenderness; exam limited by bmi    Adnexa: Not able to palpate due to adipoisty; will k ultrasound    Pelvic Floor Vault laxity; rectocele;  No high tone pelvic floor    Psych  Rectal Normal affect. Normal mood.      Assessment/Plan   Encounter Diagnoses   Name Primary?    Cervical mass; possible lesion seen on ct scan; pelvic exam NEG; prior pelvic exams and ultrsound from 2021  also NEG; exams limited by very high bmi; will eval further w ultrasound. Yes    Irregular menses; perimenopausal but also at risk for endometrial path d/t level adiposity. Chk fsh.     Fibroid; most likely uterine/cervical lesion if present; chk us.       Catina No MD    Answers submitted by the patient for this visit:  Abdominal Pain Questionnaire (Submitted on 5/23/2024)  Chief Complaint: Abdominal pain  Chronicity: new  Onset: more than 1 month ago  Onset quality: sudden  Frequency: daily  Progression since onset: unchanged  Pain location: periumbilical region, suprapubic region  Pain - numeric: 7/10  Pain quality: aching, cramping, a sensation of fullness  Radiates to: periumbilical region, suprapubic region  anorexia: No  belching: Yes  flatus: Yes  hematochezia: No  melena: No  weight loss: No  Relieved by: nothing  Diagnostic workup: CT scan

## 2024-05-25 LAB — FSH SERPL-ACNC: 13.2 IU/L

## 2024-05-29 ENCOUNTER — HOSPITAL ENCOUNTER (OUTPATIENT)
Dept: RADIOLOGY | Facility: CLINIC | Age: 54
Discharge: HOME | End: 2024-05-29
Payer: COMMERCIAL

## 2024-05-29 DIAGNOSIS — N92.6 IRREGULAR MENSES: ICD-10-CM

## 2024-05-29 DIAGNOSIS — D21.9 FIBROID: ICD-10-CM

## 2024-05-29 DIAGNOSIS — N88.8 CERVICAL MASS: ICD-10-CM

## 2024-05-29 PROCEDURE — 76856 US EXAM PELVIC COMPLETE: CPT

## 2024-05-29 PROCEDURE — 76856 US EXAM PELVIC COMPLETE: CPT | Performed by: RADIOLOGY

## 2024-05-29 PROCEDURE — 76830 TRANSVAGINAL US NON-OB: CPT | Performed by: RADIOLOGY

## 2024-06-02 ENCOUNTER — APPOINTMENT (OUTPATIENT)
Dept: RADIOLOGY | Facility: HOSPITAL | Age: 54
End: 2024-06-02
Payer: COMMERCIAL

## 2024-06-02 ENCOUNTER — ANESTHESIA (OUTPATIENT)
Dept: OPERATING ROOM | Facility: HOSPITAL | Age: 54
DRG: 580 | End: 2024-06-02
Payer: COMMERCIAL

## 2024-06-02 ENCOUNTER — HOSPITAL ENCOUNTER (INPATIENT)
Facility: HOSPITAL | Age: 54
LOS: 3 days | Discharge: HOME | DRG: 580 | End: 2024-06-05
Attending: EMERGENCY MEDICINE | Admitting: SURGERY
Payer: COMMERCIAL

## 2024-06-02 ENCOUNTER — ANESTHESIA EVENT (OUTPATIENT)
Dept: OPERATING ROOM | Facility: HOSPITAL | Age: 54
DRG: 580 | End: 2024-06-02
Payer: COMMERCIAL

## 2024-06-02 ENCOUNTER — HOSPITAL ENCOUNTER (EMERGENCY)
Facility: HOSPITAL | Age: 54
Discharge: OTHER INSTITUTION | End: 2024-06-02
Attending: EMERGENCY MEDICINE
Payer: COMMERCIAL

## 2024-06-02 VITALS
HEART RATE: 85 BPM | OXYGEN SATURATION: 96 % | DIASTOLIC BLOOD PRESSURE: 90 MMHG | RESPIRATION RATE: 22 BRPM | SYSTOLIC BLOOD PRESSURE: 154 MMHG

## 2024-06-02 DIAGNOSIS — S81.852A DOG BITE OF LEFT LOWER LEG, INITIAL ENCOUNTER: ICD-10-CM
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
## 2024-06-02 DIAGNOSIS — S56.902A OPEN WOUND OF LEFT FOREARM WITH TENDON INVOLVEMENT, INITIAL ENCOUNTER: ICD-10-CM
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
## 2024-06-02 DIAGNOSIS — S41.152A DOG BITE OF LEFT UPPER EXTREMITY, INITIAL ENCOUNTER: Primary | ICD-10-CM

## 2024-06-02 DIAGNOSIS — S51.802A OPEN WOUND OF LEFT FOREARM WITH TENDON INVOLVEMENT, INITIAL ENCOUNTER: ICD-10-CM

## 2024-06-02 PROBLEM — R07.9 CHEST PAIN: Status: RESOLVED | Noted: 2023-08-22 | Resolved: 2024-06-02

## 2024-06-02 LAB
ABO GROUP (TYPE) IN BLOOD: NORMAL
ALBUMIN SERPL-MCNC: 3.8 G/DL (ref 3.5–5)
ALP BLD-CCNC: 107 U/L (ref 35–125)
ALT SERPL-CCNC: 33 U/L (ref 5–40)
ANION GAP SERPL CALC-SCNC: 12 MMOL/L
ANTIBODY SCREEN: NORMAL
ANTIBODY SCREEN: NORMAL
AST SERPL-CCNC: 30 U/L (ref 5–40)
BASOPHILS # BLD AUTO: 0.05 X10*3/UL (ref 0–0.1)
BASOPHILS NFR BLD AUTO: 0.6 %
BILIRUB SERPL-MCNC: <0.2 MG/DL (ref 0.1–1.2)
BUN SERPL-MCNC: 11 MG/DL (ref 8–25)
CALCIUM SERPL-MCNC: 8.9 MG/DL (ref 8.5–10.4)
CHLORIDE SERPL-SCNC: 100 MMOL/L (ref 97–107)
CO2 SERPL-SCNC: 25 MMOL/L (ref 24–31)
CREAT SERPL-MCNC: 0.7 MG/DL (ref 0.4–1.6)
EGFRCR SERPLBLD CKD-EPI 2021: >90 ML/MIN/1.73M*2
EOSINOPHIL # BLD AUTO: 0.19 X10*3/UL (ref 0–0.7)
EOSINOPHIL NFR BLD AUTO: 2.3 %
ERYTHROCYTE [DISTWIDTH] IN BLOOD BY AUTOMATED COUNT: 14.6 % (ref 11.5–14.5)
GLUCOSE SERPL-MCNC: 171 MG/DL (ref 65–99)
HCT VFR BLD AUTO: 40.7 % (ref 36–46)
HGB BLD-MCNC: 12.8 G/DL (ref 12–16)
IMM GRANULOCYTES # BLD AUTO: 0.03 X10*3/UL (ref 0–0.7)
IMM GRANULOCYTES NFR BLD AUTO: 0.4 % (ref 0–0.9)
INR PPP: 1 (ref 0.9–1.2)
LYMPHOCYTES # BLD AUTO: 3.32 X10*3/UL (ref 1.2–4.8)
LYMPHOCYTES NFR BLD AUTO: 40.4 %
MCH RBC QN AUTO: 26.9 PG (ref 26–34)
MCHC RBC AUTO-ENTMCNC: 31.4 G/DL (ref 32–36)
MCV RBC AUTO: 86 FL (ref 80–100)
MONOCYTES # BLD AUTO: 0.49 X10*3/UL (ref 0.1–1)
MONOCYTES NFR BLD AUTO: 6 %
NEUTROPHILS # BLD AUTO: 4.13 X10*3/UL (ref 1.2–7.7)
NEUTROPHILS NFR BLD AUTO: 50.3 %
NRBC BLD-RTO: 0 /100 WBCS (ref 0–0)
PLATELET # BLD AUTO: 265 X10*3/UL (ref 150–450)
POTASSIUM SERPL-SCNC: 4.2 MMOL/L (ref 3.4–5.1)
PROT SERPL-MCNC: 6.9 G/DL (ref 5.9–7.9)
PROTHROMBIN TIME: 10.1 SECONDS (ref 9.3–12.7)
RBC # BLD AUTO: 4.75 X10*6/UL (ref 4–5.2)
RH FACTOR (ANTIGEN D): NORMAL
SODIUM SERPL-SCNC: 137 MMOL/L (ref 133–145)
WBC # BLD AUTO: 8.2 X10*3/UL (ref 4.4–11.3)

## 2024-06-02 PROCEDURE — 73590 X-RAY EXAM OF LOWER LEG: CPT | Mod: LEFT SIDE | Performed by: STUDENT IN AN ORGANIZED HEALTH CARE EDUCATION/TRAINING PROGRAM

## 2024-06-02 PROCEDURE — 99285 EMERGENCY DEPT VISIT HI MDM: CPT | Mod: 25 | Performed by: EMERGENCY MEDICINE

## 2024-06-02 PROCEDURE — G0390 TRAUMA RESPONS W/HOSP CRITI: HCPCS

## 2024-06-02 PROCEDURE — 36415 COLL VENOUS BLD VENIPUNCTURE: CPT | Performed by: EMERGENCY MEDICINE

## 2024-06-02 PROCEDURE — 85025 COMPLETE CBC W/AUTO DIFF WBC: CPT | Performed by: NURSE PRACTITIONER

## 2024-06-02 PROCEDURE — 99285 EMERGENCY DEPT VISIT HI MDM: CPT | Performed by: EMERGENCY MEDICINE

## 2024-06-02 PROCEDURE — 3700000001 HC GENERAL ANESTHESIA TIME - INITIAL BASE CHARGE: Performed by: ORTHOPAEDIC SURGERY

## 2024-06-02 PROCEDURE — 3700000002 HC GENERAL ANESTHESIA TIME - EACH INCREMENTAL 1 MINUTE: Performed by: ORTHOPAEDIC SURGERY

## 2024-06-02 PROCEDURE — 80053 COMPREHEN METABOLIC PANEL: CPT | Performed by: NURSE PRACTITIONER

## 2024-06-02 PROCEDURE — 3600000003 HC OR TIME - INITIAL BASE CHARGE - PROCEDURE LEVEL THREE: Performed by: ORTHOPAEDIC SURGERY

## 2024-06-02 PROCEDURE — 0KBB0ZZ EXCISION OF LEFT LOWER ARM AND WRIST MUSCLE, OPEN APPROACH: ICD-10-PCS | Performed by: ORTHOPAEDIC SURGERY

## 2024-06-02 PROCEDURE — 2500000005 HC RX 250 GENERAL PHARMACY W/O HCPCS: Performed by: STUDENT IN AN ORGANIZED HEALTH CARE EDUCATION/TRAINING PROGRAM

## 2024-06-02 PROCEDURE — 3600000008 HC OR TIME - EACH INCREMENTAL 1 MINUTE - PROCEDURE LEVEL THREE: Performed by: ORTHOPAEDIC SURGERY

## 2024-06-02 PROCEDURE — 13121 CMPLX RPR S/A/L 2.6-7.5 CM: CPT | Performed by: ORTHOPAEDIC SURGERY

## 2024-06-02 PROCEDURE — A4217 STERILE WATER/SALINE, 500 ML: HCPCS | Performed by: ORTHOPAEDIC SURGERY

## 2024-06-02 PROCEDURE — 73090 X-RAY EXAM OF FOREARM: CPT | Mod: LEFT SIDE | Performed by: STUDENT IN AN ORGANIZED HEALTH CARE EDUCATION/TRAINING PROGRAM

## 2024-06-02 PROCEDURE — 2500000005 HC RX 250 GENERAL PHARMACY W/O HCPCS: Performed by: ORTHOPAEDIC SURGERY

## 2024-06-02 PROCEDURE — 96374 THER/PROPH/DIAG INJ IV PUSH: CPT

## 2024-06-02 PROCEDURE — 36415 COLL VENOUS BLD VENIPUNCTURE: CPT | Performed by: NURSE PRACTITIONER

## 2024-06-02 PROCEDURE — 90471 IMMUNIZATION ADMIN: CPT | Performed by: NURSE PRACTITIONER

## 2024-06-02 PROCEDURE — 7100000002 HC RECOVERY ROOM TIME - EACH INCREMENTAL 1 MINUTE: Performed by: ORTHOPAEDIC SURGERY

## 2024-06-02 PROCEDURE — 96365 THER/PROPH/DIAG IV INF INIT: CPT | Performed by: EMERGENCY MEDICINE

## 2024-06-02 PROCEDURE — 01N60ZZ RELEASE RADIAL NERVE, OPEN APPROACH: ICD-10-PCS | Performed by: ORTHOPAEDIC SURGERY

## 2024-06-02 PROCEDURE — 2500000004 HC RX 250 GENERAL PHARMACY W/ HCPCS (ALT 636 FOR OP/ED): Performed by: NURSE PRACTITIONER

## 2024-06-02 PROCEDURE — 86900 BLOOD TYPING SEROLOGIC ABO: CPT | Performed by: EMERGENCY MEDICINE

## 2024-06-02 PROCEDURE — 85610 PROTHROMBIN TIME: CPT | Performed by: NURSE PRACTITIONER

## 2024-06-02 PROCEDURE — 2500000004 HC RX 250 GENERAL PHARMACY W/ HCPCS (ALT 636 FOR OP/ED): Performed by: STUDENT IN AN ORGANIZED HEALTH CARE EDUCATION/TRAINING PROGRAM

## 2024-06-02 PROCEDURE — 96375 TX/PRO/DX INJ NEW DRUG ADDON: CPT | Performed by: EMERGENCY MEDICINE

## 2024-06-02 PROCEDURE — 73090 X-RAY EXAM OF FOREARM: CPT | Mod: LT

## 2024-06-02 PROCEDURE — 86901 BLOOD TYPING SEROLOGIC RH(D): CPT | Performed by: NURSE PRACTITIONER

## 2024-06-02 PROCEDURE — 90715 TDAP VACCINE 7 YRS/> IM: CPT | Performed by: NURSE PRACTITIONER

## 2024-06-02 PROCEDURE — 12034 INTMD RPR S/TR/EXT 7.6-12.5: CPT | Performed by: SURGERY

## 2024-06-02 PROCEDURE — 7100000001 HC RECOVERY ROOM TIME - INITIAL BASE CHARGE: Performed by: ORTHOPAEDIC SURGERY

## 2024-06-02 PROCEDURE — 73590 X-RAY EXAM OF LOWER LEG: CPT | Mod: LT

## 2024-06-02 PROCEDURE — 0HQLXZZ REPAIR LEFT LOWER LEG SKIN, EXTERNAL APPROACH: ICD-10-PCS | Performed by: ORTHOPAEDIC SURGERY

## 2024-06-02 PROCEDURE — 2500000004 HC RX 250 GENERAL PHARMACY W/ HCPCS (ALT 636 FOR OP/ED): Performed by: EMERGENCY MEDICINE

## 2024-06-02 PROCEDURE — 2500000001 HC RX 250 WO HCPCS SELF ADMINISTERED DRUGS (ALT 637 FOR MEDICARE OP): Performed by: SURGERY

## 2024-06-02 PROCEDURE — 99222 1ST HOSP IP/OBS MODERATE 55: CPT | Performed by: ORTHOPAEDIC SURGERY

## 2024-06-02 PROCEDURE — 99285 EMERGENCY DEPT VISIT HI MDM: CPT | Mod: 25

## 2024-06-02 PROCEDURE — 2500000004 HC RX 250 GENERAL PHARMACY W/ HCPCS (ALT 636 FOR OP/ED): Performed by: ORTHOPAEDIC SURGERY

## 2024-06-02 PROCEDURE — 64708 REVISE ARM/LEG NERVE: CPT | Performed by: ORTHOPAEDIC SURGERY

## 2024-06-02 RX ORDER — HYDROMORPHONE HYDROCHLORIDE 1 MG/ML
INJECTION, SOLUTION INTRAMUSCULAR; INTRAVENOUS; SUBCUTANEOUS AS NEEDED
Status: DISCONTINUED | OUTPATIENT
Start: 2024-06-02 | End: 2024-06-03

## 2024-06-02 RX ORDER — MIDAZOLAM HYDROCHLORIDE 1 MG/ML
INJECTION INTRAMUSCULAR; INTRAVENOUS AS NEEDED
Status: DISCONTINUED | OUTPATIENT
Start: 2024-06-02 | End: 2024-06-03

## 2024-06-02 RX ORDER — KETOROLAC TROMETHAMINE 30 MG/ML
INJECTION, SOLUTION INTRAMUSCULAR; INTRAVENOUS AS NEEDED
Status: DISCONTINUED | OUTPATIENT
Start: 2024-06-02 | End: 2024-06-03

## 2024-06-02 RX ORDER — ONDANSETRON HYDROCHLORIDE 2 MG/ML
INJECTION, SOLUTION INTRAVENOUS AS NEEDED
Status: DISCONTINUED | OUTPATIENT
Start: 2024-06-02 | End: 2024-06-03

## 2024-06-02 RX ORDER — BACITRACIN ZINC 500 UNIT/G
OINTMENT IN PACKET (EA) TOPICAL AS NEEDED
Status: DISCONTINUED | OUTPATIENT
Start: 2024-06-02 | End: 2024-06-02 | Stop reason: HOSPADM

## 2024-06-02 RX ORDER — FENTANYL CITRATE 50 UG/ML
50 INJECTION, SOLUTION INTRAMUSCULAR; INTRAVENOUS ONCE
Status: COMPLETED | OUTPATIENT
Start: 2024-06-02 | End: 2024-06-02

## 2024-06-02 RX ORDER — HYDROMORPHONE HYDROCHLORIDE 1 MG/ML
1 INJECTION, SOLUTION INTRAMUSCULAR; INTRAVENOUS; SUBCUTANEOUS ONCE
Status: DISCONTINUED | OUTPATIENT
Start: 2024-06-02 | End: 2024-06-03

## 2024-06-02 RX ORDER — SODIUM CHLORIDE, SODIUM LACTATE, POTASSIUM CHLORIDE, CALCIUM CHLORIDE 600; 310; 30; 20 MG/100ML; MG/100ML; MG/100ML; MG/100ML
75 INJECTION, SOLUTION INTRAVENOUS CONTINUOUS
Status: DISCONTINUED | OUTPATIENT
Start: 2024-06-02 | End: 2024-06-03

## 2024-06-02 RX ORDER — CEFAZOLIN 1 G/1
INJECTION, POWDER, FOR SOLUTION INTRAVENOUS AS NEEDED
Status: DISCONTINUED | OUTPATIENT
Start: 2024-06-02 | End: 2024-06-03

## 2024-06-02 RX ORDER — PROPOFOL 10 MG/ML
INJECTION, EMULSION INTRAVENOUS AS NEEDED
Status: DISCONTINUED | OUTPATIENT
Start: 2024-06-02 | End: 2024-06-03

## 2024-06-02 RX ORDER — SODIUM CHLORIDE 0.9 G/100ML
IRRIGANT IRRIGATION AS NEEDED
Status: DISCONTINUED | OUTPATIENT
Start: 2024-06-02 | End: 2024-06-02 | Stop reason: HOSPADM

## 2024-06-02 RX ORDER — LIDOCAINE HYDROCHLORIDE 20 MG/ML
INJECTION, SOLUTION INFILTRATION; PERINEURAL AS NEEDED
Status: DISCONTINUED | OUTPATIENT
Start: 2024-06-02 | End: 2024-06-03

## 2024-06-02 RX ORDER — LIDOCAINE HYDROCHLORIDE 10 MG/ML
0.1 INJECTION INFILTRATION; PERINEURAL ONCE
Status: DISCONTINUED | OUTPATIENT
Start: 2024-06-02 | End: 2024-06-03

## 2024-06-02 RX ORDER — ONDANSETRON HYDROCHLORIDE 2 MG/ML
4 INJECTION, SOLUTION INTRAVENOUS ONCE AS NEEDED
Status: DISCONTINUED | OUTPATIENT
Start: 2024-06-02 | End: 2024-06-03 | Stop reason: HOSPADM

## 2024-06-02 RX ORDER — HYDROMORPHONE HYDROCHLORIDE 1 MG/ML
0.5 INJECTION, SOLUTION INTRAMUSCULAR; INTRAVENOUS; SUBCUTANEOUS EVERY 5 MIN PRN
Status: DISCONTINUED | OUTPATIENT
Start: 2024-06-02 | End: 2024-06-03 | Stop reason: HOSPADM

## 2024-06-02 RX ORDER — ROCURONIUM BROMIDE 10 MG/ML
INJECTION, SOLUTION INTRAVENOUS AS NEEDED
Status: DISCONTINUED | OUTPATIENT
Start: 2024-06-02 | End: 2024-06-03

## 2024-06-02 RX ORDER — SODIUM CHLORIDE, SODIUM LACTATE, POTASSIUM CHLORIDE, CALCIUM CHLORIDE 600; 310; 30; 20 MG/100ML; MG/100ML; MG/100ML; MG/100ML
100 INJECTION, SOLUTION INTRAVENOUS CONTINUOUS
Status: DISCONTINUED | OUTPATIENT
Start: 2024-06-02 | End: 2024-06-03 | Stop reason: HOSPADM

## 2024-06-02 RX ORDER — FENTANYL CITRATE 50 UG/ML
INJECTION, SOLUTION INTRAMUSCULAR; INTRAVENOUS AS NEEDED
Status: DISCONTINUED | OUTPATIENT
Start: 2024-06-02 | End: 2024-06-03

## 2024-06-02 RX ORDER — SUCCINYLCHOLINE CHLORIDE 20 MG/ML
INJECTION INTRAMUSCULAR; INTRAVENOUS AS NEEDED
Status: DISCONTINUED | OUTPATIENT
Start: 2024-06-02 | End: 2024-06-03

## 2024-06-02 RX ORDER — BUPIVACAINE HYDROCHLORIDE 5 MG/ML
INJECTION, SOLUTION PERINEURAL AS NEEDED
Status: DISCONTINUED | OUTPATIENT
Start: 2024-06-02 | End: 2024-06-02 | Stop reason: HOSPADM

## 2024-06-02 RX ORDER — LABETALOL HYDROCHLORIDE 5 MG/ML
5 INJECTION, SOLUTION INTRAVENOUS ONCE AS NEEDED
Status: DISCONTINUED | OUTPATIENT
Start: 2024-06-02 | End: 2024-06-03 | Stop reason: HOSPADM

## 2024-06-02 RX ADMIN — DEXAMETHASONE SODIUM PHOSPHATE 4 MG: 4 INJECTION INTRA-ARTICULAR; INTRALESIONAL; INTRAMUSCULAR; INTRAVENOUS; SOFT TISSUE at 23:05

## 2024-06-02 RX ADMIN — HYDROMORPHONE HYDROCHLORIDE 0.5 MG: 1 INJECTION, SOLUTION INTRAMUSCULAR; INTRAVENOUS; SUBCUTANEOUS at 23:05

## 2024-06-02 RX ADMIN — ROCURONIUM 30 MG: 50 INJECTION, SOLUTION INTRAVENOUS at 22:29

## 2024-06-02 RX ADMIN — MIDAZOLAM HYDROCHLORIDE 2 MG: 1 INJECTION, SOLUTION INTRAMUSCULAR; INTRAVENOUS at 22:20

## 2024-06-02 RX ADMIN — FENTANYL CITRATE 50 MCG: 50 INJECTION, SOLUTION INTRAMUSCULAR; INTRAVENOUS at 22:20

## 2024-06-02 RX ADMIN — SODIUM CHLORIDE, POTASSIUM CHLORIDE, SODIUM LACTATE AND CALCIUM CHLORIDE: 600; 310; 30; 20 INJECTION, SOLUTION INTRAVENOUS at 22:13

## 2024-06-02 RX ADMIN — LIDOCAINE HYDROCHLORIDE 100 MG: 20 INJECTION, SOLUTION INFILTRATION; PERINEURAL at 22:20

## 2024-06-02 RX ADMIN — ONDANSETRON 4 MG: 2 INJECTION, SOLUTION INTRAMUSCULAR; INTRAVENOUS at 23:05

## 2024-06-02 RX ADMIN — SUCCINYLCHOLINE CHLORIDE 200 MG: 20 INJECTION, SOLUTION INTRAMUSCULAR; INTRAVENOUS at 22:21

## 2024-06-02 RX ADMIN — PROPOFOL 30 MG: 10 INJECTION, EMULSION INTRAVENOUS at 22:30

## 2024-06-02 RX ADMIN — Medication 6 L/MIN: at 23:56

## 2024-06-02 RX ADMIN — FENTANYL CITRATE 50 MCG: 50 INJECTION, SOLUTION INTRAMUSCULAR; INTRAVENOUS at 22:32

## 2024-06-02 RX ADMIN — KETOROLAC TROMETHAMINE 30 MG: 30 INJECTION, SOLUTION INTRAMUSCULAR; INTRAVENOUS at 23:09

## 2024-06-02 RX ADMIN — TETANUS TOXOID, REDUCED DIPHTHERIA TOXOID AND ACELLULAR PERTUSSIS VACCINE, ADSORBED 0.5 ML: 5; 2.5; 8; 8; 2.5 SUSPENSION INTRAMUSCULAR at 18:11

## 2024-06-02 RX ADMIN — PROPOFOL 150 MG: 10 INJECTION, EMULSION INTRAVENOUS at 22:20

## 2024-06-02 RX ADMIN — PROPOFOL 20 MG: 10 INJECTION, EMULSION INTRAVENOUS at 23:33

## 2024-06-02 RX ADMIN — FENTANYL CITRATE 50 MCG: 50 INJECTION INTRAMUSCULAR; INTRAVENOUS at 18:12

## 2024-06-02 RX ADMIN — SODIUM CHLORIDE 3 G: 900 INJECTION INTRAVENOUS at 18:21

## 2024-06-02 RX ADMIN — FENTANYL CITRATE 50 MCG: 50 INJECTION, SOLUTION INTRAMUSCULAR; INTRAVENOUS at 20:03

## 2024-06-02 RX ADMIN — CEFAZOLIN 3 G: 1 INJECTION, POWDER, FOR SOLUTION INTRAMUSCULAR; INTRAVENOUS at 22:26

## 2024-06-02 RX ADMIN — HYDROMORPHONE HYDROCHLORIDE 0.5 MG: 1 INJECTION, SOLUTION INTRAMUSCULAR; INTRAVENOUS; SUBCUTANEOUS at 23:33

## 2024-06-02 SDOH — HEALTH STABILITY: MENTAL HEALTH: CURRENT SMOKER: 0

## 2024-06-02 ASSESSMENT — PAIN SCALES - GENERAL
PAINLEVEL_OUTOF10: 6
PAINLEVEL_OUTOF10: 8
PAINLEVEL_OUTOF10: 8

## 2024-06-02 ASSESSMENT — PAIN DESCRIPTION - LOCATION: LOCATION: LEG

## 2024-06-02 ASSESSMENT — ENCOUNTER SYMPTOMS
SHORTNESS OF BREATH: 0
PHOTOPHOBIA: 0
BRUISES/BLEEDS EASILY: 0
VOMITING: 0
WHEEZING: 0
FLANK PAIN: 0
BACK PAIN: 0
NUMBNESS: 0
PALPITATIONS: 0
ABDOMINAL PAIN: 0
NAUSEA: 0
COUGH: 0

## 2024-06-02 ASSESSMENT — PAIN - FUNCTIONAL ASSESSMENT
PAIN_FUNCTIONAL_ASSESSMENT: 0-10
PAIN_FUNCTIONAL_ASSESSMENT: 0-10

## 2024-06-02 ASSESSMENT — PAIN DESCRIPTION - PAIN TYPE: TYPE: ACUTE PAIN

## 2024-06-02 ASSESSMENT — LIFESTYLE VARIABLES
HAVE PEOPLE ANNOYED YOU BY CRITICIZING YOUR DRINKING: NO
TOTAL SCORE: 0
EVER HAD A DRINK FIRST THING IN THE MORNING TO STEADY YOUR NERVES TO GET RID OF A HANGOVER: NO
HAVE YOU EVER FELT YOU SHOULD CUT DOWN ON YOUR DRINKING: NO
EVER FELT BAD OR GUILTY ABOUT YOUR DRINKING: NO

## 2024-06-02 ASSESSMENT — COLUMBIA-SUICIDE SEVERITY RATING SCALE - C-SSRS
1. IN THE PAST MONTH, HAVE YOU WISHED YOU WERE DEAD OR WISHED YOU COULD GO TO SLEEP AND NOT WAKE UP?: NO
6. HAVE YOU EVER DONE ANYTHING, STARTED TO DO ANYTHING, OR PREPARED TO DO ANYTHING TO END YOUR LIFE?: NO
2. HAVE YOU ACTUALLY HAD ANY THOUGHTS OF KILLING YOURSELF?: NO

## 2024-06-02 ASSESSMENT — PAIN DESCRIPTION - DESCRIPTORS: DESCRIPTORS: BURNING

## 2024-06-02 ASSESSMENT — PAIN DESCRIPTION - FREQUENCY: FREQUENCY: CONSTANT/CONTINUOUS

## 2024-06-02 ASSESSMENT — PAIN DESCRIPTION - ORIENTATION: ORIENTATION: LEFT

## 2024-06-02 NOTE — ED TRIAGE NOTES
Pt is a trasnfer from [facility] for a trauma consult. Pt was breaking up a dog fight between her two pitbulls when she was bitten in her L leg and arm. Tourniquet was placed on L arm on scene d/t concern for possible arterial bleed. Both dogs are UTD on vaccines.

## 2024-06-02 NOTE — ED PROVIDER NOTES
HPI   No chief complaint on file.      HPI  See my MDM                  [place] Coma Scale Score: 15                     Patient History   Past Medical History:   Diagnosis Date    Abnormal Pap smear of cervix     Colon polyps     Depression     Diabetes (Multi)     High cholesterol     Hypertension     Sleep apnea     Urinary tract infection     Varicella      Past Surgical History:   Procedure Laterality Date    APPENDECTOMY      BREAST BIOPSY       SECTION, CLASSIC       Section     SECTION, CLASSIC       SECTION, CLASSIC      baby passed away    CONDYLOMA EXCISION/FULGURATION      OTHER SURGICAL HISTORY  10/23/2014    Bunion Correction By [place] Procedure     Family History   Problem Relation Name Age of Onset    Dementia Mother Fely     Coronary artery disease Mother Fely     Heart attack Mother Fely     Other (Uterine leiomyoma) Mother Fely     Arthritis Mother Fely     Depression Mother Fely     Hearing loss Mother Fely     Prostate cancer Father Chato         in remission heart vave replacment in 2023    Atrial fibrillation Father Chato     Anxiety disorder Brother      Depression Brother      Migraines Daughter      Other (bruises easily) Daughter       Social History     Tobacco Use    Smoking status: Former     Types: Cigarettes     Passive exposure: Past    Smokeless tobacco: Never    Tobacco comments:     Quit    Vaping Use    Vaping status: Never Used   Substance Use Topics    Alcohol use: Yes     Alcohol/week: 2.0 standard drinks of alcohol     Types: 2 Glasses of wine per week     Comment: Drink occasionally    Drug use: Never       Physical Exam   ED Triage Vitals [24 1801]   Temp Heart Rate Respirations BP   -- 82 18 (!) 149/98      Pulse Ox Temp src Heart Rate Source Patient Position   98 % -- -- --      BP Location FiO2 (%)     -- --       Physical Exam  CONSTITUTIONAL: Vital signs reviewed as  charted, well-developed and in no distress  Eyes: Extraocular muscles are intact. Pupils equal round and reactive to light. Conjunctiva are pink.    ENT: Mucous membranes are moist. Tongue in the midline. Pharynx was without erythema or exudates, uvula midline  LUNGS: Breath sounds equal and clear to auscultation. Good air exchange, no wheezes rales or retractions, pulse oximetry is charted.  HEART: Regular rate and rhythm without murmur thrill or rub, strong tones, auscultation is normal.  ABDOMEN: Soft and nontender without guarding rebound rigidity or mass. Bowel sounds are present and normal in all quadrants. There is no palpable masses or aneurysms identified. No hepatosplenomegaly, normal abdominal exam.  Neuro: The patient is awake, alert and oriented ×3. Moving all 4 extremities and answering questions appropriately.   MUSCULOSKELETAL: The calves are nontender to palpation. Full gross active range of motion.   PSYCH: Awake alert oriented, normal mood and affect.  Skin:  Dry, normal color, warm to the touch, no rash present.  Multiple lacerations present to the left lower leg between the knee and the ankle.  Examination of the left arm does show significant laceration to the proximal forearm on the dorsal surface is 4 cm wide with extrusion of muscular tissue.  Her neurological status is not intact distally.  She is lacking extension of the thumb and other 4 digits.  Intrinsic hand motions are not intact.  Sensation does appear to be intact.  She is able to minimally extend the wrist.  Radial and ulnar arteries are graded 2+ and equal bilaterally.  Cap refill less than 2 seconds.    ED Course & MDM   Diagnoses as of 06/02/24 1834   Dog bite of left upper extremity, initial encounter   Dog bite of left lower leg, initial encounter       Medical Decision Making    History obtained from: patient    Vital signs, nursing notes, current medications, past medical history, Surgical history, allergies, social history,  family History were reviewed.         HPI:  Patient 53-year-old female emergency room today for evaluation of dog bite to the left arm and left leg.  This was done by a pit bull mixture.  She reports 2 of her dogs were fighting and started biting her.  Thinks her tetanus is sometimes in the last 5 and 10 years.  EMS reported prior to their arrival the police reported arterial bleeding of the left arm when they arrived they stated it was not spurting anymore but they put a tourniquet in place over the bicep anyways.  On her arrival here to the ED we did take the tourniquet down there was not significant arterial bleeding at this time.  Does have a large hematoma overlying the proximal third of the forearm.  There is a very large laceration approximately over the dorsal surface with extrusion of muscular tissue there is another large laceration more distal.  There is multiple lacerations present also on the palmar aspect of the forearm.  She does have several lacerations on both sides of the lower leg.  Neurological testing distally from the wounds of her arm do show her intrinsic hand functions are not intact, she does have minimal extension of the wrist, she does not have function of extension of any of her 5 digits.  She does not have function with opposition, adduction of her fingers or making okay sign.      10 point ROS was reviewed and negative except Noted above in HPI.  DDX: as listed above          MDM Summary/considerations:  EMERGENCY DEPARTMENT COURSE and DIFFERENTIAL DIAGNOSIS/MDM:    The patient presented with a chief complaint of dog bite left arm left leg. The differential diagnosis associated with this patient's presentation includes significant muscular and neurological injuries, dog bites.     Vitals:    Vitals:    06/02/24 1812 06/02/24 1815 06/02/24 1818 06/02/24 1821   BP:    (!) 110/98   Pulse: 91 90 87 85   Resp: 15 14 15 17   SpO2: 96%          Diagnoses as of 06/02/24 1834   Dog bite of left  upper extremity, initial encounter   Dog bite of left lower leg, initial encounter       History Limited by:    None    Independent history obtained from:    None    External records reviewed:    None    Diagnostics interpreted by me:    Xray(s) left arm left leg    Discussions with other clinicians:    Trauma surgery Dr. Macedo, hand surgery Dr. Gregorio, ED attending Dr. Salomon    Chronic conditions impacting care:    None    Social determinants of health affecting care:    None    Diagnostic tests considered but not performed: none    ED Medications managed:    Medications   ampicillin-sulbactam (Unasyn) 3 g in sodium chloride 0.9 % 100 mL IV (3 g intravenous New Bag 6/2/24 1821)   fentaNYL PF (Sublimaze) injection 50 mcg (50 mcg intravenous Given 6/2/24 1812)   diphth,pertus(acell),tetanus (BoostRIX) 2.5-8-5 Lf-mcg-Lf/0.5mL vaccine 0.5 mL (0.5 mL intramuscular Given 6/2/24 1811)       Prescription drugs considered:    None    Screenings:          Labs Reviewed   CBC WITH AUTO DIFFERENTIAL - Abnormal       Result Value    WBC 8.2      nRBC 0.0      RBC 4.75      Hemoglobin 12.8      Hematocrit 40.7      MCV 86      MCH 26.9      MCHC 31.4 (*)     RDW 14.6 (*)     Platelets 265      Neutrophils % 50.3      Immature Granulocytes %, Automated 0.4      Lymphocytes % 40.4      Monocytes % 6.0      Eosinophils % 2.3      Basophils % 0.6      Neutrophils Absolute 4.13      Immature Granulocytes Absolute, Automated 0.03      Lymphocytes Absolute 3.32      Monocytes Absolute 0.49      Eosinophils Absolute 0.19      Basophils Absolute 0.05     COMPREHENSIVE METABOLIC PANEL   PROTIME-INR   TYPE AND SCREEN     XR tibia fibula left 2 views    (Results Pending)   XR forearm left 2 views    (Results Pending)     Medications   ampicillin-sulbactam (Unasyn) 3 g in sodium chloride 0.9 % 100 mL IV (3 g intravenous New Bag 6/2/24 1821)   fentaNYL PF (Sublimaze) injection 50 mcg (50 mcg intravenous Given 6/2/24 1812)    diphth,pertus(acell),tetanus (BoostRIX) 2.5-8-5 Lf-mcg-Lf/0.5mL vaccine 0.5 mL (0.5 mL intramuscular Given 6/2/24 1811)     New Prescriptions    No medications on file     Patient started on 3 g of Unasyn, given some IV fentanyl for the pain and updated on tetanus.  Wounds were cleaned up by nursing staff x-rays initiated and patient was transferred to [facility] for likely surgical exploration and repair.        I saw this patient in conjunction with Dr. Crandall, please see her supervision note.      Critical Care: CRITICAL CARE NOTE     The patient was reevaluated/re-examined multiple times during the visit. Critical care time includes management at bedside, discussion with other providers and consultants, family counseling and answering questions, and documentation. Care involves decision making of high complexity to assess, manipulate, and support vital organ system failure and/or to prevent further life threatening deterioration of the patient's condition. Failure to initiate these interventions on an urgent basis would likely result in sudden, clinically significant or life threatening deterioration in the patient's condition of traumatic left arm injury with nerve and vascular damage       Critical care time total at least 35 minutes of non concurrent critical care time provided by myself. This did not include any separate billable procedures.                  This chart was completed using voice recognition transcription software. Please excuse any errors of transcription including grammatical, punctuation, syntax and spelling errors.  Please contact me with any questions regarding this chart.  Procedure  Procedures     Orlando Hardin, [id number]  06/02/24 2282

## 2024-06-02 NOTE — ED TRIAGE NOTES
Tourniquet applied by police at 1722. Reported arterial bleed by Police in [facility]. Controlled on arrival. Pt tried breaking up a dog fight between her 2 pitbull mixes. The dogs are up to date on shots. Pt has 2 Lacerations on Left shin and 3 lacerations on the bottom of left forearm  and a large open wound on top of left arm. Nerve damage noted by Dr. Lux

## 2024-06-03 LAB
ALBUMIN SERPL BCP-MCNC: 3.7 G/DL (ref 3.4–5)
ANION GAP SERPL CALC-SCNC: 17 MMOL/L (ref 10–20)
BUN SERPL-MCNC: 13 MG/DL (ref 6–23)
CALCIUM SERPL-MCNC: 8.6 MG/DL (ref 8.6–10.6)
CHLORIDE SERPL-SCNC: 100 MMOL/L (ref 98–107)
CO2 SERPL-SCNC: 25 MMOL/L (ref 21–32)
CREAT SERPL-MCNC: 0.88 MG/DL (ref 0.5–1.05)
EGFRCR SERPLBLD CKD-EPI 2021: 78 ML/MIN/1.73M*2
ERYTHROCYTE [DISTWIDTH] IN BLOOD BY AUTOMATED COUNT: 14.7 % (ref 11.5–14.5)
EST. AVERAGE GLUCOSE BLD GHB EST-MCNC: 163 MG/DL
GLUCOSE BLD MANUAL STRIP-MCNC: 243 MG/DL (ref 74–99)
GLUCOSE BLD MANUAL STRIP-MCNC: 257 MG/DL (ref 74–99)
GLUCOSE BLD MANUAL STRIP-MCNC: 259 MG/DL (ref 74–99)
GLUCOSE BLD MANUAL STRIP-MCNC: 264 MG/DL (ref 74–99)
GLUCOSE BLD MANUAL STRIP-MCNC: 292 MG/DL (ref 74–99)
GLUCOSE BLD MANUAL STRIP-MCNC: 332 MG/DL (ref 74–99)
GLUCOSE SERPL-MCNC: 252 MG/DL (ref 74–99)
HBA1C MFR BLD: 7.3 %
HCT VFR BLD AUTO: 37.8 % (ref 36–46)
HGB BLD-MCNC: 11.9 G/DL (ref 12–16)
MAGNESIUM SERPL-MCNC: 1.83 MG/DL (ref 1.6–2.4)
MCH RBC QN AUTO: 27.2 PG (ref 26–34)
MCHC RBC AUTO-ENTMCNC: 31.5 G/DL (ref 32–36)
MCV RBC AUTO: 87 FL (ref 80–100)
NRBC BLD-RTO: 0 /100 WBCS (ref 0–0)
PHOSPHATE SERPL-MCNC: 2.8 MG/DL (ref 2.5–4.9)
PLATELET # BLD AUTO: 256 X10*3/UL (ref 150–450)
POTASSIUM SERPL-SCNC: 4.5 MMOL/L (ref 3.5–5.3)
RBC # BLD AUTO: 4.37 X10*6/UL (ref 4–5.2)
SODIUM SERPL-SCNC: 137 MMOL/L (ref 136–145)
WBC # BLD AUTO: 15 X10*3/UL (ref 4.4–11.3)

## 2024-06-03 PROCEDURE — 85027 COMPLETE CBC AUTOMATED: CPT | Performed by: NURSE PRACTITIONER

## 2024-06-03 PROCEDURE — 83735 ASSAY OF MAGNESIUM: CPT | Performed by: NURSE PRACTITIONER

## 2024-06-03 PROCEDURE — 97165 OT EVAL LOW COMPLEX 30 MIN: CPT | Mod: GO

## 2024-06-03 PROCEDURE — 97162 PT EVAL MOD COMPLEX 30 MIN: CPT | Mod: GP

## 2024-06-03 PROCEDURE — 2500000001 HC RX 250 WO HCPCS SELF ADMINISTERED DRUGS (ALT 637 FOR MEDICARE OP): Performed by: NURSE PRACTITIONER

## 2024-06-03 PROCEDURE — 2500000002 HC RX 250 W HCPCS SELF ADMINISTERED DRUGS (ALT 637 FOR MEDICARE OP, ALT 636 FOR OP/ED): Performed by: PHYSICIAN ASSISTANT

## 2024-06-03 PROCEDURE — 2500000004 HC RX 250 GENERAL PHARMACY W/ HCPCS (ALT 636 FOR OP/ED): Performed by: NURSE PRACTITIONER

## 2024-06-03 PROCEDURE — 82947 ASSAY GLUCOSE BLOOD QUANT: CPT

## 2024-06-03 PROCEDURE — 2500000002 HC RX 250 W HCPCS SELF ADMINISTERED DRUGS (ALT 637 FOR MEDICARE OP, ALT 636 FOR OP/ED): Performed by: NURSE PRACTITIONER

## 2024-06-03 PROCEDURE — 1100000001 HC PRIVATE ROOM DAILY

## 2024-06-03 PROCEDURE — 97535 SELF CARE MNGMENT TRAINING: CPT | Mod: GO

## 2024-06-03 PROCEDURE — 2500000001 HC RX 250 WO HCPCS SELF ADMINISTERED DRUGS (ALT 637 FOR MEDICARE OP): Performed by: PHYSICIAN ASSISTANT

## 2024-06-03 PROCEDURE — 83036 HEMOGLOBIN GLYCOSYLATED A1C: CPT | Performed by: PHYSICIAN ASSISTANT

## 2024-06-03 PROCEDURE — 2500000005 HC RX 250 GENERAL PHARMACY W/O HCPCS: Performed by: STUDENT IN AN ORGANIZED HEALTH CARE EDUCATION/TRAINING PROGRAM

## 2024-06-03 PROCEDURE — 80069 RENAL FUNCTION PANEL: CPT | Performed by: NURSE PRACTITIONER

## 2024-06-03 PROCEDURE — 97116 GAIT TRAINING THERAPY: CPT | Mod: GP

## 2024-06-03 PROCEDURE — 36415 COLL VENOUS BLD VENIPUNCTURE: CPT | Performed by: NURSE PRACTITIONER

## 2024-06-03 RX ORDER — DEXTROSE 50 % IN WATER (D50W) INTRAVENOUS SYRINGE
25
Status: DISCONTINUED | OUTPATIENT
Start: 2024-06-03 | End: 2024-06-05 | Stop reason: HOSPADM

## 2024-06-03 RX ORDER — OXYCODONE HYDROCHLORIDE 5 MG/1
5 TABLET ORAL EVERY 4 HOURS PRN
Status: DISCONTINUED | OUTPATIENT
Start: 2024-06-03 | End: 2024-06-04

## 2024-06-03 RX ORDER — ASPIRIN 81 MG/1
81 TABLET ORAL DAILY
Status: DISCONTINUED | OUTPATIENT
Start: 2024-06-03 | End: 2024-06-05 | Stop reason: HOSPADM

## 2024-06-03 RX ORDER — OXYCODONE HYDROCHLORIDE 5 MG/1
10 TABLET ORAL EVERY 4 HOURS PRN
Status: DISCONTINUED | OUTPATIENT
Start: 2024-06-03 | End: 2024-06-04

## 2024-06-03 RX ORDER — INSULIN LISPRO 100 [IU]/ML
0-15 INJECTION, SOLUTION INTRAVENOUS; SUBCUTANEOUS
Status: DISCONTINUED | OUTPATIENT
Start: 2024-06-03 | End: 2024-06-05 | Stop reason: HOSPADM

## 2024-06-03 RX ORDER — METFORMIN HYDROCHLORIDE 500 MG/1
1000 TABLET, EXTENDED RELEASE ORAL
Status: DISCONTINUED | OUTPATIENT
Start: 2024-06-04 | End: 2024-06-05 | Stop reason: HOSPADM

## 2024-06-03 RX ORDER — FERROUS SULFATE, DRIED 160(50) MG
1 TABLET, EXTENDED RELEASE ORAL DAILY
Status: DISCONTINUED | OUTPATIENT
Start: 2024-06-03 | End: 2024-06-05 | Stop reason: HOSPADM

## 2024-06-03 RX ORDER — ATORVASTATIN CALCIUM 20 MG/1
20 TABLET, FILM COATED ORAL DAILY
Status: DISCONTINUED | OUTPATIENT
Start: 2024-06-03 | End: 2024-06-05 | Stop reason: HOSPADM

## 2024-06-03 RX ORDER — CITALOPRAM 40 MG/1
40 TABLET, FILM COATED ORAL DAILY
Status: DISCONTINUED | OUTPATIENT
Start: 2024-06-03 | End: 2024-06-05 | Stop reason: HOSPADM

## 2024-06-03 RX ORDER — AMOXICILLIN AND CLAVULANATE POTASSIUM 875; 125 MG/1; MG/1
1 TABLET, FILM COATED ORAL EVERY 12 HOURS SCHEDULED
Status: DISCONTINUED | OUTPATIENT
Start: 2024-06-03 | End: 2024-06-05 | Stop reason: HOSPADM

## 2024-06-03 RX ORDER — INSULIN LISPRO 100 [IU]/ML
0-10 INJECTION, SOLUTION INTRAVENOUS; SUBCUTANEOUS
Status: DISCONTINUED | OUTPATIENT
Start: 2024-06-03 | End: 2024-06-03

## 2024-06-03 RX ORDER — ACETAMINOPHEN 325 MG/1
975 TABLET ORAL EVERY 8 HOURS
Status: DISCONTINUED | OUTPATIENT
Start: 2024-06-03 | End: 2024-06-05 | Stop reason: HOSPADM

## 2024-06-03 RX ORDER — VALSARTAN 320 MG/1
320 TABLET ORAL DAILY
Status: DISCONTINUED | OUTPATIENT
Start: 2024-06-03 | End: 2024-06-05 | Stop reason: HOSPADM

## 2024-06-03 RX ORDER — AMOXICILLIN 250 MG
2 CAPSULE ORAL NIGHTLY
Status: DISCONTINUED | OUTPATIENT
Start: 2024-06-03 | End: 2024-06-05 | Stop reason: HOSPADM

## 2024-06-03 RX ORDER — DEXTROSE 50 % IN WATER (D50W) INTRAVENOUS SYRINGE
12.5
Status: DISCONTINUED | OUTPATIENT
Start: 2024-06-03 | End: 2024-06-05 | Stop reason: HOSPADM

## 2024-06-03 RX ORDER — ENOXAPARIN SODIUM 100 MG/ML
60 INJECTION SUBCUTANEOUS EVERY 12 HOURS
Status: DISCONTINUED | OUTPATIENT
Start: 2024-06-03 | End: 2024-06-05 | Stop reason: HOSPADM

## 2024-06-03 RX ADMIN — AMOXICILLIN AND CLAVULANATE POTASSIUM 1 TABLET: 875; 125 TABLET, FILM COATED ORAL at 20:50

## 2024-06-03 RX ADMIN — CITALOPRAM HYDROBROMIDE 40 MG: 40 TABLET ORAL at 08:31

## 2024-06-03 RX ADMIN — PIPERACILLIN SODIUM AND TAZOBACTAM SODIUM 3.38 G: 3; .375 INJECTION, SOLUTION INTRAVENOUS at 02:18

## 2024-06-03 RX ADMIN — ACETAMINOPHEN 975 MG: 325 TABLET ORAL at 18:21

## 2024-06-03 RX ADMIN — Medication 2 L/MIN: at 00:30

## 2024-06-03 RX ADMIN — PIPERACILLIN SODIUM AND TAZOBACTAM SODIUM 3.38 G: 3; .375 INJECTION, SOLUTION INTRAVENOUS at 08:31

## 2024-06-03 RX ADMIN — INSULIN HUMAN 8 UNITS: 100 INJECTION, SOLUTION PARENTERAL at 08:48

## 2024-06-03 RX ADMIN — INSULIN LISPRO 9 UNITS: 100 INJECTION, SOLUTION INTRAVENOUS; SUBCUTANEOUS at 20:51

## 2024-06-03 RX ADMIN — ENOXAPARIN SODIUM 60 MG: 100 INJECTION SUBCUTANEOUS at 05:58

## 2024-06-03 RX ADMIN — ENOXAPARIN SODIUM 60 MG: 100 INJECTION SUBCUTANEOUS at 18:21

## 2024-06-03 RX ADMIN — INSULIN HUMAN 6 UNITS: 100 INJECTION, SOLUTION PARENTERAL at 05:59

## 2024-06-03 RX ADMIN — SODIUM CHLORIDE, POTASSIUM CHLORIDE, SODIUM LACTATE AND CALCIUM CHLORIDE 75 ML/HR: 600; 310; 30; 20 INJECTION, SOLUTION INTRAVENOUS at 02:30

## 2024-06-03 RX ADMIN — INSULIN LISPRO 6 UNITS: 100 INJECTION, SOLUTION INTRAVENOUS; SUBCUTANEOUS at 11:44

## 2024-06-03 RX ADMIN — Medication 1 TABLET: at 08:31

## 2024-06-03 RX ADMIN — ACETAMINOPHEN 975 MG: 325 TABLET ORAL at 23:36

## 2024-06-03 RX ADMIN — ASPIRIN 81 MG: 81 TABLET, COATED ORAL at 08:31

## 2024-06-03 RX ADMIN — DOCUSATE SODIUM 50 MG AND SENNOSIDES 8.6 MG 2 TABLET: 8.6; 5 TABLET, FILM COATED ORAL at 20:50

## 2024-06-03 RX ADMIN — ACETAMINOPHEN 975 MG: 325 TABLET ORAL at 02:29

## 2024-06-03 RX ADMIN — OXYCODONE HYDROCHLORIDE 5 MG: 5 TABLET ORAL at 11:31

## 2024-06-03 RX ADMIN — ACETAMINOPHEN 975 MG: 325 TABLET ORAL at 08:31

## 2024-06-03 RX ADMIN — AMOXICILLIN AND CLAVULANATE POTASSIUM 1 TABLET: 875; 125 TABLET, FILM COATED ORAL at 11:23

## 2024-06-03 RX ADMIN — ATORVASTATIN CALCIUM 20 MG: 20 TABLET, FILM COATED ORAL at 08:31

## 2024-06-03 RX ADMIN — INSULIN HUMAN 4 UNITS: 100 INJECTION, SOLUTION PARENTERAL at 02:29

## 2024-06-03 SDOH — ECONOMIC STABILITY: HOUSING INSECURITY
IN THE LAST 12 MONTHS, WAS THERE A TIME WHEN YOU DID NOT HAVE A STEADY PLACE TO SLEEP OR SLEPT IN A SHELTER (INCLUDING NOW)?: NO

## 2024-06-03 SDOH — SOCIAL STABILITY: SOCIAL INSECURITY: ABUSE: ADULT

## 2024-06-03 SDOH — SOCIAL STABILITY: SOCIAL INSECURITY: HAVE YOU HAD THOUGHTS OF HARMING ANYONE ELSE?: NO

## 2024-06-03 SDOH — ECONOMIC STABILITY: INCOME INSECURITY: HOW HARD IS IT FOR YOU TO PAY FOR THE VERY BASICS LIKE FOOD, HOUSING, MEDICAL CARE, AND HEATING?: NOT HARD AT ALL

## 2024-06-03 SDOH — SOCIAL STABILITY: SOCIAL INSECURITY: HAS ANYONE EVER THREATENED TO HURT YOUR FAMILY OR YOUR PETS?: NO

## 2024-06-03 SDOH — SOCIAL STABILITY: SOCIAL INSECURITY: WERE YOU ABLE TO COMPLETE ALL THE BEHAVIORAL HEALTH SCREENINGS?: YES

## 2024-06-03 SDOH — ECONOMIC STABILITY: INCOME INSECURITY: IN THE LAST 12 MONTHS, WAS THERE A TIME WHEN YOU WERE NOT ABLE TO PAY THE MORTGAGE OR RENT ON TIME?: NO

## 2024-06-03 SDOH — SOCIAL STABILITY: SOCIAL INSECURITY: ARE THERE ANY APPARENT SIGNS OF INJURIES/BEHAVIORS THAT COULD BE RELATED TO ABUSE/NEGLECT?: NO

## 2024-06-03 SDOH — ECONOMIC STABILITY: TRANSPORTATION INSECURITY
IN THE PAST 12 MONTHS, HAS THE LACK OF TRANSPORTATION KEPT YOU FROM MEDICAL APPOINTMENTS OR FROM GETTING MEDICATIONS?: NO

## 2024-06-03 SDOH — SOCIAL STABILITY: SOCIAL INSECURITY: DOES ANYONE TRY TO KEEP YOU FROM HAVING/CONTACTING OTHER FRIENDS OR DOING THINGS OUTSIDE YOUR HOME?: NO

## 2024-06-03 SDOH — ECONOMIC STABILITY: TRANSPORTATION INSECURITY
IN THE PAST 12 MONTHS, HAS LACK OF TRANSPORTATION KEPT YOU FROM MEETINGS, WORK, OR FROM GETTING THINGS NEEDED FOR DAILY LIVING?: NO

## 2024-06-03 SDOH — SOCIAL STABILITY: SOCIAL INSECURITY: DO YOU FEEL UNSAFE GOING BACK TO THE PLACE WHERE YOU ARE LIVING?: NO

## 2024-06-03 SDOH — SOCIAL STABILITY: SOCIAL INSECURITY: ARE YOU OR HAVE YOU BEEN THREATENED OR ABUSED PHYSICALLY, EMOTIONALLY, OR SEXUALLY BY ANYONE?: NO

## 2024-06-03 SDOH — SOCIAL STABILITY: SOCIAL INSECURITY: DO YOU FEEL ANYONE HAS EXPLOITED OR TAKEN ADVANTAGE OF YOU FINANCIALLY OR OF YOUR PERSONAL PROPERTY?: NO

## 2024-06-03 ASSESSMENT — PAIN - FUNCTIONAL ASSESSMENT
PAIN_FUNCTIONAL_ASSESSMENT: 0-10

## 2024-06-03 ASSESSMENT — COGNITIVE AND FUNCTIONAL STATUS - GENERAL
DRESSING REGULAR LOWER BODY CLOTHING: A LOT
PERSONAL GROOMING: A LITTLE
TURNING FROM BACK TO SIDE WHILE IN FLAT BAD: A LITTLE
EATING MEALS: A LITTLE
MOVING FROM LYING ON BACK TO SITTING ON SIDE OF FLAT BED WITH BEDRAILS: A LITTLE
STANDING UP FROM CHAIR USING ARMS: A LOT
WALKING IN HOSPITAL ROOM: A LOT
PERSONAL GROOMING: A LITTLE
DAILY ACTIVITIY SCORE: 16
MOBILITY SCORE: 15
TOILETING: A LITTLE
MOVING TO AND FROM BED TO CHAIR: A LITTLE
CLIMB 3 TO 5 STEPS WITH RAILING: A LOT
DRESSING REGULAR LOWER BODY CLOTHING: A LOT
DAILY ACTIVITIY SCORE: 15
DRESSING REGULAR UPPER BODY CLOTHING: A LOT
MOBILITY SCORE: 24
DRESSING REGULAR UPPER BODY CLOTHING: A LITTLE
EATING MEALS: A LITTLE
TOILETING: A LOT
HELP NEEDED FOR BATHING: A LITTLE
HELP NEEDED FOR BATHING: A LOT
PATIENT BASELINE BEDBOUND: NO

## 2024-06-03 ASSESSMENT — PAIN SCALES - GENERAL
PAINLEVEL_OUTOF10: 0 - NO PAIN
PAINLEVEL_OUTOF10: 0 - NO PAIN
PAINLEVEL_OUTOF10: 2
PAINLEVEL_OUTOF10: 6
PAINLEVEL_OUTOF10: 5 - MODERATE PAIN
PAINLEVEL_OUTOF10: 4
PAIN_LEVEL: 0
PAINLEVEL_OUTOF10: 0 - NO PAIN
PAINLEVEL_OUTOF10: 6

## 2024-06-03 ASSESSMENT — ACTIVITIES OF DAILY LIVING (ADL)
FEEDING YOURSELF: INDEPENDENT
WALKS IN HOME: INDEPENDENT
ADL_ASSISTANCE: INDEPENDENT
JUDGMENT_ADEQUATE_SAFELY_COMPLETE_DAILY_ACTIVITIES: YES
HEARING - LEFT EAR: FUNCTIONAL
BATHING: INDEPENDENT
PATIENT'S MEMORY ADEQUATE TO SAFELY COMPLETE DAILY ACTIVITIES?: YES
ADEQUATE_TO_COMPLETE_ADL: YES
HOME_MANAGEMENT_TIME_ENTRY: 17
GROOMING: INDEPENDENT
DRESSING YOURSELF: INDEPENDENT
HEARING - RIGHT EAR: FUNCTIONAL
BATHING_ASSISTANCE: MODERATE
TOILETING: INDEPENDENT

## 2024-06-03 ASSESSMENT — PAIN DESCRIPTION - LOCATION: LOCATION: LEG

## 2024-06-03 ASSESSMENT — PATIENT HEALTH QUESTIONNAIRE - PHQ9
SUM OF ALL RESPONSES TO PHQ9 QUESTIONS 1 & 2: 0
1. LITTLE INTEREST OR PLEASURE IN DOING THINGS: NOT AT ALL
2. FEELING DOWN, DEPRESSED OR HOPELESS: NOT AT ALL

## 2024-06-03 ASSESSMENT — PAIN DESCRIPTION - ORIENTATION: ORIENTATION: LEFT

## 2024-06-03 ASSESSMENT — LIFESTYLE VARIABLES
HOW OFTEN DO YOU HAVE A DRINK CONTAINING ALCOHOL: 2-4 TIMES A MONTH
AUDIT-C TOTAL SCORE: 2
HOW MANY STANDARD DRINKS CONTAINING ALCOHOL DO YOU HAVE ON A TYPICAL DAY: 1 OR 2
HOW OFTEN DO YOU HAVE 6 OR MORE DRINKS ON ONE OCCASION: NEVER
AUDIT-C TOTAL SCORE: 2
SKIP TO QUESTIONS 9-10: 1

## 2024-06-03 ASSESSMENT — COLUMBIA-SUICIDE SEVERITY RATING SCALE - C-SSRS
2. HAVE YOU ACTUALLY HAD ANY THOUGHTS OF KILLING YOURSELF?: NO
1. IN THE PAST MONTH, HAVE YOU WISHED YOU WERE DEAD OR WISHED YOU COULD GO TO SLEEP AND NOT WAKE UP?: NO
6. HAVE YOU EVER DONE ANYTHING, STARTED TO DO ANYTHING, OR PREPARED TO DO ANYTHING TO END YOUR LIFE?: NO

## 2024-06-03 ASSESSMENT — PAIN SCALES - PAIN ASSESSMENT IN ADVANCED DEMENTIA (PAINAD): TOTALSCORE: MEDICATION (SEE MAR)

## 2024-06-03 NOTE — CARE PLAN
Problem: Pain  Goal: My pain/discomfort is manageable  Outcome: Progressing     Problem: Safety  Goal: Patient will be injury free during hospitalization  Outcome: Progressing  Goal: I will remain free of falls  Outcome: Progressing     Problem: Daily Care  Goal: Daily care needs are met  Outcome: Progressing     Problem: Psychosocial Needs  Goal: Demonstrates ability to cope with hospitalization/illness  Outcome: Progressing  Goal: Collaborate with me, my family, and caregiver to identify my specific goals  Outcome: Progressing     Problem: Discharge Barriers  Goal: My discharge needs are met  Outcome: Progressing     Problem: Pain  Goal: Takes deep breaths with improved pain control throughout the shift  Outcome: Progressing  Goal: Turns in bed with improved pain control throughout the shift  Outcome: Progressing  Goal: Walks with improved pain control throughout the shift  Outcome: Progressing  Goal: Performs ADL's with improved pain control throughout shift  Outcome: Progressing  Goal: Participates in PT with improved pain control throughout the shift  Outcome: Progressing  Goal: Free from opioid side effects throughout the shift  Outcome: Progressing  Goal: Free from acute confusion related to pain meds throughout the shift  Outcome: Progressing     Problem: Skin  Goal: Decreased wound size/increased tissue granulation at next dressing change  Outcome: Progressing  Goal: Participates in plan/prevention/treatment measures  Outcome: Progressing  Goal: Prevent/manage excess moisture  Outcome: Progressing  Goal: Prevent/minimize sheer/friction injuries  Outcome: Progressing  Goal: Promote/optimize nutrition  Outcome: Progressing  Goal: Promote skin healing  Outcome: Progressing   The patient's goals for the shift include      The clinical goals for the shift include Pt will have adequate pain control throughout the shift

## 2024-06-03 NOTE — DISCHARGE INSTRUCTIONS
Weight Bearing Instructions:  You may be non weight bearing on your Left upper extremity at all times.      Call your provider if:   Call if any drainage after 7 days, increased redness/warmth/swelling at incision site, pain/tenderness of calf, swelling of calf that does not respond to elevation, SOB/chest pain.    Dressing instructions:   Maintain your splint until follow up. Do not get wet or remove.    Follow up:  Please call to schedule a follow-up appointment with your orthopedic surgeon in 2-3 weeks.      Left Leg:  If unable to place wound vac or vac is not able to maintain suction, please change to WTD Kerlix packing on the posterior calf to be changed daily until follow up.

## 2024-06-03 NOTE — H&P
[facility]  TRAUMA SERVICE - HISTORY AND PHYSICAL / CONSULT    Patient Name: Vilma Grover  MRN: [id number]  Admit Date: 602  : 1970  AGE: 53 y.o.   GENDER: female  ==============================================================================  MECHANISM OF INJURY / CHIEF COMPLAINT:   53F right hand dominant transfer from [facility] for further evaluation of dog bites to left forearm and left calf sustained today after trying to break up two dogs fighting.    Denies fall or other injury.  Tourniquet was placed, able to be taken down with hemostasis.  XR of left forearm and tib-fib without acute fracture.   Tetanus updated and given Unasyn prior to transfer to [facility].        LOC (yes/no?): no  Anticoagulant / Anti-platelet Rx? (for what dx?): ASA (cardioprotection)  Referring Facility Name (N/A for scene EMR run): [facility]    INJURIES:   Lacerations with muscle exposure to left forearm with loss of motor function of digits  Lacerations LLE    OTHER MEDICAL PROBLEMS:  HTN  HLD  DM2      ==============================================================================  ADMISSION PLAN OF CARE:  Hand consult and recs appreciated -> plan for OR tonight, NWB LUE  Trauma to washout and repair left lower extremity wounds  NPO  LR @ 75ml/hr  Admit to Trauma floor post-op    Seen and discussed with Dr. Remington Root, APRN-CNP  Trauma, Critical Care, and Acute Care Surgery  Ext [phone] (floor), [phone] (ICU)    ==============================================================================  PAST MEDICAL HISTORY:   PMH:   Past Medical History:   Diagnosis Date    Abnormal Pap smear of cervix     Colon polyps     Depression     Diabetes (Multi)     High cholesterol     Hypertension     Sleep apnea     Urinary tract infection     Varicella      PSH:   Past Surgical History:   Procedure Laterality Date    APPENDECTOMY      BREAST BIOPSY       SECTION, CLASSIC        Section     SECTION, CLASSIC       SECTION, CLASSIC      baby passed away    CONDYLOMA EXCISION/FULGURATION      OTHER SURGICAL HISTORY  10/23/2014    Bunion Correction By Alfred Procedure     FH:   Family History   Problem Relation Name Age of Onset    Dementia Mother Fely     Coronary artery disease Mother Fely     Heart attack Mother Fely     Other (Uterine leiomyoma) Mother Fely     Arthritis Mother Fely     Depression Mother Fely     Hearing loss Mother Fely     Prostate cancer Father Chato         in remission heart vave replacment in 2023    Atrial fibrillation Father Chato     Anxiety disorder Brother      Depression Brother      Migraines Daughter      Other (bruises easily) Daughter       SOCIAL HISTORY:    Smoking:    Social History     Tobacco Use   Smoking Status Former    Types: Cigarettes    Passive exposure: Past   Smokeless Tobacco Never   Tobacco Comments    Quit        Alcohol:   Social History     Substance and Sexual Activity   Alcohol Use Yes    Alcohol/week: 2.0 standard drinks of alcohol    Types: 2 Glasses of wine per week    Comment: Drink occasionally       Drug use: denies    MEDICATIONS:   Prior to Admission medications    Medication Sig Start Date End Date Taking? Authorizing Provider   aspirin ([place] Aspirin) 81 mg EC tablet Take 1 tablet (81 mg) by mouth once daily.    Historical Provider, MD   atorvastatin (Lipitor) 20 mg tablet Take 1 tablet (20 mg) by mouth once daily.    Historical Provider, MD   calcium carbonate/vitamin D3 (CALCIUM 600 + D,3, ORAL) Calcium + D3    Historical Provider, MD   citalopram (CeleXA) 40 mg tablet TAKE ONE TABLET BY MOUTH EVERY DAY 3/18/24   Rachel Savage, APRN-CNP   cyanocobalamin (Vitamin B-12) 1,000 mcg tablet Take 100 mcg by mouth once daily. For 30 days    Historical Provider, MD   dulaglutide 3 mg/0.5 mL pen injector Inject 3 mg under the skin 1 (one) time per week.  5/19/24   JAMSHID Alves   metFORMIN  mg 24 hr tablet Take 2 tablets (1,000 mg) by mouth once daily in the evening. Take with meals. 2/20/24 8/18/24  JAMSHID Alves   MULTIVITAMIN ORAL Take 1 tablet by mouth once daily. For 30 days    Historical Provider, MD   NON FORMULARY Tumeric 1 cap twice a day    Historical Provider, MD   nystatin (Mycostatin) cream APPLY TO THE AFFECTED AREA(S) TWICE DAILY 6/13/23   Historical Provider, MD   Trulicity 4.5 mg/0.5 mL pen injector Inject 4.5 mg under the skin 1 (one) time per week. 9/9/23   Historical Provider, MD   valsartan (Diovan) 320 mg tablet Take 1 tablet (320 mg) by mouth once daily. 2/20/24 8/18/24  JAMSHID Alves     ALLERGIES:    Allergies   Allergen Reactions    Erythromycin Base Unknown     Upset stomach    Lisinopril Cough       REVIEW OF SYSTEMS:  Review of Systems   Eyes:  Negative for photophobia and visual disturbance.   Respiratory:  Negative for cough, shortness of breath and wheezing.    Cardiovascular:  Negative for chest pain and palpitations.   Gastrointestinal:  Negative for abdominal pain, nausea and vomiting.   Genitourinary:  Negative for flank pain and pelvic pain.   Musculoskeletal:  Negative for back pain.        Pain to LUE/LLE    Neurological:  Negative for syncope and numbness.   Hematological:  Does not bruise/bleed easily.     PHYSICAL EXAM:  PRIMARY SURVEY:  Primary Survey  SECONDARY SURVEY/PHYSICAL EXAM:  Physical Exam  Vitals reviewed.   Constitutional:       General: She is not in acute distress.  HENT:      Head: Normocephalic and atraumatic.      Right Ear: External ear normal.      Left Ear: External ear normal.      Nose: Nose normal.      Mouth/Throat:      Pharynx: Oropharynx is clear.   Cardiovascular:      Rate and Rhythm: Normal rate and regular rhythm.      Comments: +2 left radial and ulnar pulses  +2 right radial pulse  +2 DP/PT pulses bilaterally  Abdominal:      Palpations: Abdomen  is soft.      Tenderness: There is no abdominal tenderness.      Comments: No wounds   Musculoskeletal:      Cervical back: Neck supple. No tenderness.      Comments: Lacerations to ulnar aspect left forearm with muscle exposed, hemostatic  SILT  Unable to flex, extend, or perform opposition of digits    Multiple lacerations to left medial and posterior calf with subcutaneous tissue exposed, hemostatic  Baseline numbness bilateral toes - baseline per patient  5/5 dorsi/plantar flexion, performing ROM ankle without difficulty    No wounds to back.  No tenderness/step-offs T/L spines   Skin:     General: Skin is warm and dry.   Neurological:      Mental Status: She is alert and oriented to person, place, and time.       IMAGING SUMMARY:   XR L forearm: large soft tissue defect, no acute fracture  XR L tib-fib: lacerations, no acute fracture    LABS:  Results from last 7 days   Lab Units 06/02/24  1824   WBC AUTO x10*3/uL 8.2   HEMOGLOBIN g/dL 12.8   HEMATOCRIT % 40.7   PLATELETS AUTO x10*3/uL 265   NEUTROS PCT AUTO % 50.3   LYMPHS PCT AUTO % 40.4   MONOS PCT AUTO % 6.0   EOS PCT AUTO % 2.3     Results from last 7 days   Lab Units 06/02/24  1824   INR  1.0     Results from last 7 days   Lab Units 06/02/24  1824   SODIUM mmol/L 137   POTASSIUM mmol/L 4.2   CHLORIDE mmol/L 100   CO2 mmol/L 25   BUN mg/dL 11   CREATININE mg/dL 0.70   CALCIUM mg/dL 8.9   PROTEIN TOTAL g/dL 6.9   BILIRUBIN TOTAL mg/dL <0.2   ALK PHOS U/L 107   ALT U/L 33   AST U/L 30   GLUCOSE mg/dL 171*     Results from last 7 days   Lab Units 06/02/24  1824   BILIRUBIN TOTAL mg/dL <0.2           I have reviewed all laboratory and imaging results ordered/pertinent for this encounter.

## 2024-06-03 NOTE — PROGRESS NOTES
"Orthopaedic Surgery Progress Note    S:    NAEON. Pain controlled on current regimen. Extremity elevated on pillows, instructed on importance of extremity elevation to control swelling. Denies nausea, vomiting, chest pain, dyspnea.     O:  /75   Pulse 80   Temp 35.7 °C (96.3 °F) (Tympanic)   Resp 16   Ht 1.626 m (5' 4\")   Wt 141 kg (310 lb)   LMP 05/20/2024 (Approximate) Comment: last period was 6 mos ago  SpO2 96%   BMI 53.21 kg/m²     GEN - NAD, resting comfortably in hospital bed  HEENT - MMM, EOMI, NCAT   CV - RRR by peripheral palpation, limbs wwp  PULM - NWOB on RA  ABD - Non-distended  NEURO - ABREU spontaneously, CNs II - XII grossly intact  PSYCH - Appropriate mood and affect    MSK:  LUE:   -Post-operative dressing/splint in place without strikethrough bleeding.   -Fires AIN/ulnar distributions; unable to fire PIN distribution  -SILT axillary/radial/median/ulnar distributions  -hand wwp, brisk cap refill, 2+ radial pulse  -Compartments soft and compressible, no pain with passive stretch      A/P: 53 y.o. female PMH depression, DM, HLD, HTN, SAUL sustaining dog bite injury to L forearm now s/p I&D, wound exploration on 6/2 with Dr. Gregorio. Noted injury to PIN, discussed with patient likely need for future surgery to address this issue.     Plan:  - Weight bearing: NWB L arm in splint  - DVT ppx: SCDs, okay for chemo PPx per primary  - Diet: Okay for diet from orthopedic perspective   - Perioperative Antibiotics: 72 hours periop zosyn   - Drain: None  - Post-operative Imaging: None     - No plans to return to the OR with orthopedic surgery this admission, will plan for tendon transfers at a later date    This plan was discussed with the attending, Dr. Gregorio.    Ivan Archuleta MD  Orthopaedic Surgery, PGY-2  Available by Epic Chat  06/03/24  7:42 AM    After 0700, this patient will be followed by the orthopedic hand team. Please message on epic chat with questions/updates.      First call: " Devaughn Archuleta, PGY-2   Second call: Devaughn Hastings, PGY-4    Please call on call resident ([phone]) nights after 6pm and weekends.

## 2024-06-03 NOTE — CARE PLAN
Problem: Pain  Goal: Participates in PT with improved pain control throughout the shift  6/3/2024 1526 by Vick Obrien, RN  Outcome: Progressing  6/3/2024 1523 by Vick Obrien, CASEY  Outcome: Progressing   The patient's goals for the shift include      The clinical goals for the shift include pain management    Over the shift, the patient did not make progress toward the following goals. Barriers to progression include ***. Recommendations to address these barriers include ***.

## 2024-06-03 NOTE — PROGRESS NOTES
Pharmacy Admission Order Reconciliation Review    Vilma Grover is a 53 y.o. female admitted for Dog bite. Pharmacy reviewed the patient's unreconciled admission medications.    Prior to admission medications that were reviewed and acted on by the pharmacist include:  Turmeric capsule OTC  These medications have been reconciled.     Any other unreconcilied medications have been addressed and will be ordered or held by the patient's medical team. Medications addressed by the pharmacist may be added or changed by the patient's medical team at any time.    Elias Demarco, PharmD  Transitions of Care Pharmacist  [facility]  Please reach out via Secure Chat for questions

## 2024-06-03 NOTE — ANESTHESIA PROCEDURE NOTES
Airway  Date/Time: 6/2/2024 10:21 PM  Urgency: elective    Airway not difficult    Staffing  Performed: resident   Authorized by: Isai Waller DO    Performed by: Adilson Brandon DO  Patient location during procedure: OR    Indications and Patient Condition  Indications for airway management: airway protection  Spontaneous Ventilation: absent  Sedation level: deep  Preoxygenated: yes  Patient position: sniffing  Mask difficulty assessment: 1 - vent by mask    Final Airway Details  Final airway type: endotracheal airway      Successful airway: ETT  Cuffed: yes   Successful intubation technique: video laryngoscopy  Facilitating devices/methods: intubating stylet  Endotracheal tube insertion site: oral  Blade: Beatrice  Blade size: #3  ETT size (mm): 7.0  Cormack-Lehane Classification: grade I - full view of glottis  Placement verified by: chest auscultation   Measured from: teeth  ETT to teeth (cm): 22  Number of attempts at approach: 1  Number of other approaches attempted: 0

## 2024-06-03 NOTE — PROGRESS NOTES
"[facility]  TRAUMA SERVICE - PROGRESS NOTE    Patient Name: Vilma Grover  MRN: [id number]  Admit Date: 602  : 1970  AGE: 53 y.o.   GENDER: female  ==============================================================================  MECHANISM OF INJURY:   53 YOF s/p dog bites    LOC (yes/no?): no  Anticoagulant / Anti-platelet Rx? (for what dx?): asa (cardioprotection)  Referring Facility Name (N/A for scene EMR run): [facility]    INJURIES:   Lacerations with muscle exposure to left forearm with loss of motor function of digits  Lacerations LLE    OTHER MEDICAL PROBLEMS:  HTN  HLD  DM2    INCIDENTAL FINDINGS:  N/a    PROCEDURES:  : (hand) I&D left forearm  : (trauma) washout and closure of LLE wounds    ==============================================================================  TODAY'S ASSESSMENT AND PLAN OF CARE:  ## LUE wounds  - Ortho hand following       -> Zosyn x72h       -> Outpatient fu 2 weeks       -> NWB LUE in splint  - Will change to Augmentin today for 5d course  - Pain control with [place]. Tylenol, PRN Oxy 5/10 q4h  - PT/OT    ## LLE wounds  - Wound care; dressing changed /3am  - Pain control as above    ## PMHx   - Continue home ASA, Atorvastatin, Citalopram  - Start home Metformin this morning  - Start home Valsartan     ## FEN/GI  - DM diet  - BR  - Dc IVF  - ISS ACHS today    ## PPX  - SCDs  - LVX 60mg BID for BMI >50    Dispo: Continue care on RNF at this time    ==============================================================================  CHIEF COMPLAINT / OVERNIGHT EVENTS:   Pain to the L arm and leg. Overall doing well without other complaints    MEDICAL HISTORY / ROS:  Admission history and ROS reviewed. Pertinent changes as follows:  N/a    PHYSICAL EXAM:  Heart Rate:  [80-96]   Temp:  [35.7 °C (96.3 °F)-36.8 °C (98.3 °F)]   Resp:  [11-26]   BP: (110-160)/(69-99)   Height:  [162.6 cm (5' 4\")]   Weight:  [141 kg (310 lb)]   SpO2:  [91 %-99 %] "   Physical Exam  HENT:      Head: Atraumatic.      Mouth/Throat:      Mouth: Mucous membranes are moist.   Eyes:      Extraocular Movements: Extraocular movements intact.      Pupils: Pupils are equal, round, and reactive to light.   Cardiovascular:      Rate and Rhythm: Normal rate.      Pulses: Normal pulses.   Pulmonary:      Effort: Pulmonary effort is normal. No respiratory distress.      Breath sounds: Normal breath sounds.   Chest:      Chest wall: No tenderness.   Abdominal:      General: There is no distension.      Palpations: Abdomen is soft.      Tenderness: There is no abdominal tenderness.   Musculoskeletal:      Cervical back: No tenderness.      Comments: LUE in splint. Able to wiggle all fingers. Sensation grossly intact throughout. Unable to assess radial pulse 2/2 splint application. Cap refill <2 seconds, warm/perfused digits.  LLE with multiple sutured wound to lateral and posterior calf. All clean/intact with minimal ss drainage. [id number] wound to posterior calf with WTD Kerlix in place.   Neurological:      General: No focal deficit present.      Mental Status: She is alert and oriented to person, place, and time.      Sensory: No sensory deficit.      Motor: No weakness.   Psychiatric:         Mood and Affect: Mood normal.         Behavior: Behavior normal.         IMAGING SUMMARY:  (summary of new imaging findings, not a copy of dictation)  No new imaging to review today.    LABS:  Results from last 7 days   Lab Units 06/02/24  1824   WBC AUTO x10*3/uL 8.2   HEMOGLOBIN g/dL 12.8   HEMATOCRIT % 40.7   PLATELETS AUTO x10*3/uL 265   NEUTROS PCT AUTO % 50.3   LYMPHS PCT AUTO % 40.4   MONOS PCT AUTO % 6.0   EOS PCT AUTO % 2.3     Results from last 7 days   Lab Units 06/02/24  1824   INR  1.0     Results from last 7 days   Lab Units 06/02/24  1824   SODIUM mmol/L 137   POTASSIUM mmol/L 4.2   CHLORIDE mmol/L 100   CO2 mmol/L 25   BUN mg/dL 11   CREATININE mg/dL 0.70   CALCIUM mg/dL 8.9   PROTEIN  TOTAL g/dL 6.9   BILIRUBIN TOTAL mg/dL <0.2   ALK PHOS U/L 107   ALT U/L 33   AST U/L 30   GLUCOSE mg/dL 171*     Results from last 7 days   Lab Units 06/02/24  1824   BILIRUBIN TOTAL mg/dL <0.2           I have reviewed all medications, laboratory results, and imaging pertinent for today's encounter.

## 2024-06-03 NOTE — ANESTHESIA POSTPROCEDURE EVALUATION
Patient: Vilma Grover    Procedure Summary       Date: 06/02/24 Room / Location: [facility] OR 07 / [facility]    Anesthesia Start: 2213 Anesthesia Stop: 2354    Procedures:       EXPLORATION RIGHT FOREARM / IRRIGATION AND DEBRIDMENT OF LEFT UPPER EXTREMITY WOUNDS AND CLOSURE / RADIAL NERVE NEOLYSIS (Left: Hand)      WASHOUT AND CLOSURE OF WOUNDS X 8 / WASHOUT AND PACKING OF WOUND X 1 (Left: Leg Lower) Diagnosis:       Dog bite, initial encounter      (Dog bite, initial encounter [W54.0XXA])    Surgeons: Tlaon Gregorio MD; Ronn Macedo DO Responsible Provider: Isai Waller DO    Anesthesia Type: general ASA Status: 3 - Emergent            Anesthesia Type: general    Vitals Value Taken Time   /79 06/02/24 2358   Temp 36.2 06/03/24 0003   Pulse 86 06/03/24 0001   Resp 16 06/03/24 0001   SpO2 98 % 06/03/24 0001   Vitals shown include unfiled device data.    Anesthesia Post Evaluation    Patient location during evaluation: PACU  Patient participation: complete - patient participated  Level of consciousness: awake and alert  Pain score: 0  Pain management: adequate  Airway patency: patent  Cardiovascular status: acceptable  Respiratory status: acceptable and face mask  Hydration status: acceptable  Postoperative Nausea and Vomiting: none        No notable events documented.

## 2024-06-03 NOTE — CONSULTS
Orthopaedic Surgery Consult H&P    HPI:   Orthopaedic Problems/Injuries: L forearm dog bite    53 y.o. female PMH depression, DM, HLD, HTN, SAUL p/a dog bite to LUE and LLE. T/f from OSH with tourniquet in place, hemostatic upon arrival at removal.     Location: Painful at L arm  Duration: Painful since accident this afternoon  Severity: 5/10  Improved with rest/pain medication, worsened with ROM  Associated Symptoms: Denies numbness, tingling in LUE; unable to extend fingers/wrist   Skin status: multiple deep lacerations over dorsal and volar proximal forearm; Neurovascular status: sensory intact, possible PIN injury vs severe extensor muscle injury given exam    Other Injuries: LLE superficial lacerations  NPO Status: 4 PM     PMH: per HPI  PSH: per HPI  SocHx:      - No tobacco use      - No EtOH use      - Denies other drug use  FamHx:  Non-contributory to this patient's acute orthopaedic problem.   Allergies: Reviewed in EMR  Meds: Reviewed in EMR    ROS      - 14 point ROS negative except as above    Physical Exam:  Constitutional: Patient comfortable and resting on bed. No acute distress. Conversant.   Skin: Warm and dry, no rashes   Eyes: EOMI, clear sclera   ENMT: MMM   HEENT: Neck supple, trachea midline, no apparent injury   Respiratory: Regular rate of breathing on RA   CV: RRR per peripheral pulses  GI: non-distended   Lymph: No apparent LAD   Psych: Appropriate Mood/behavior   MSK/Neuro:  A full secondary survey was conducted. Patient did not have any acute pain with ROM or palpation of other extremities other than that which is mentioned below.    L upper extremity:   -multiple large complex lacerations over LUE proximal volar and dorsal forearm with venous bleeding but no arterial bleeding, deep muscle injury, probe past fascia  -Tender at site of injury with painful ROM.  -Fires AIN/ulnar distributions; unable to fire PIN distribution  -SILT axillary/radial/median/ulnar distributions  -Hand warm,  well perfused  -Palpable radial pulse, cap refill brisk  -Compartments soft and compressible    LLE:   -multiple lacerations to distal leg, hemostatic, no significant deep extension  -Motor intact in DF/PF/EHL/FHL with 5/5 strength, SILT in saph/sural/SPN/DPN/tibial distributions  -Foot wwp, brisk cap refill, 2+ DP/PT pulse  -Compartments soft and compressible, no pain with passive dorsiflexion     A full secondary exam was performed and all relevant findings discussed and noted above.     Imaging:  AP and lateral radiographs of the L arm display soft tissue injury but no fracture.    Assessment:  Orthopaedic Problems/Injuries: L forearm dog bite    53 y.o. female PMH depression, DM, HLD, HTN, SAUL p/a dog bite to LUE and LLE. Multiple large complex lacerations over LUE proximal volar and dorsal forearm with venous bleeding. AIN/U motor intact, unable to fire extensors. M/R/U sensory intact. 2+ radial pulse. XR w/o Fx. Wounds dressed in ED.     Plan:  - NPO for upcoming surgery with orthopedics.  - Admit to trauma surgery  - Please obtain pre-operative labs/studies: T&S, PT/INR, CBC, BMP, CXR, EKG   - Consented and posted to OR schedule for I&D, exploration, possible vessel/nerve/muscle/tendon repair w/ orthopedic surgery emergently; Trauma surgery to perform LLE debridement at closure concurrently  - Strict Bedrest, NWB LUE    - Pre-operative ABx: given in ED  - No indication for transfusion pre-operatively  - DVT PPx: SCDs, okay for chemoppx per primary    This consult was staffed with attending physician, Dr. Gregorio.    This patient was seen and staffed within 30 minutes of initial consult.  _________________________________________________________    Edi Cochran MD, PGY-4  Orthopedic Surgery  [phone]  [employer] Chat Preferred    After 0700, this patient will be followed by the orthopedic hand team. Please message on epic chat with questions/updates.      First call: Devaughn Archuleta, PGY-2   Second call: Devaughn Hastings,  PGY-4    Please call on call resident ([phone]) nights after 6pm and weekends.

## 2024-06-03 NOTE — H&P
[facility] Department of Orthopaedic Surgery   Surgical History & Physical Update       Reason for Surgery: LUE dog bite  Planned Procedure: LUE exploration, I&D, possible nerve/vessel/tendon/muscle repair     History & Physical Reviewed:  I have reviewed the History and Physical for obtained within the last 30 days. Relevant findings and updates are noted below:  No significant changes.     Home medications were reviewed with significant updates noted below:  No significant changes.     Allergies:  Reviewed in EMR      ERAS patient?: No     COVID-19 Risk Consent:   Surgeon has reviewed the key risks related to francisco COVID-19 and subsequent sequelae.     Edi Cochran MD, PGY-4  Orthopedic Surgery  [phone]  Available on My[employer]/Epic Chat

## 2024-06-03 NOTE — CARE PLAN
The patient's goals for the shift include  control blood sugar levels; feel better    The clinical goals for the shift include Pt will have adequate pain control throughout the shift    Over the shift, the patient did not make progress toward the following goals. Barriers to progression include none. Pt expressed a pain level of 0 throughout the shift. Recommendations to address these barriers include none. Continue prn pain medications as needed.      Problem: Pain  Goal: My pain/discomfort is manageable  Outcome: Progressing     Problem: Safety  Goal: Patient will be injury free during hospitalization  Outcome: Progressing  Goal: I will remain free of falls  Outcome: Progressing     Problem: Daily Care  Goal: Daily care needs are met  Outcome: Progressing     Problem: Psychosocial Needs  Goal: Demonstrates ability to cope with hospitalization/illness  Outcome: Progressing  Goal: Collaborate with me, my family, and caregiver to identify my specific goals  Outcome: Progressing  Flowsheets (Taken 6/3/2024 0155)  Cultural Requests During Hospitalization: n/a  Spiritual Requests During Hospitalization: n/a     Problem: Discharge Barriers  Goal: My discharge needs are met  Outcome: Progressing     Problem: Pain  Goal: Takes deep breaths with improved pain control throughout the shift  Outcome: Progressing  Goal: Turns in bed with improved pain control throughout the shift  Outcome: Progressing  Goal: Walks with improved pain control throughout the shift  Outcome: Progressing  Goal: Performs ADL's with improved pain control throughout shift  Outcome: Progressing  Goal: Participates in PT with improved pain control throughout the shift  Outcome: Progressing  Goal: Free from opioid side effects throughout the shift  Outcome: Progressing  Goal: Free from acute confusion related to pain meds throughout the shift  Outcome: Progressing     Problem: Skin  Goal: Decreased wound size/increased tissue granulation at next dressing  change  Outcome: Progressing  Goal: Participates in plan/prevention/treatment measures  Outcome: Progressing  Goal: Prevent/manage excess moisture  Outcome: Progressing  Goal: Prevent/minimize sheer/friction injuries  Outcome: Progressing  Goal: Promote/optimize nutrition  Outcome: Progressing  Goal: Promote skin healing  Outcome: Progressing

## 2024-06-03 NOTE — BRIEF OP NOTE
Date: 2024  OR Location: [facility] OR    Name: Vilma Grover, : 1970, Age: 53 y.o., MRN: [id number], Sex: female    Diagnosis  Pre-op Diagnosis     * Dog bite, initial encounter [W54.0XXA] Post-op Diagnosis     * Dog bite, initial encounter [W54.0XXA]     Procedures  Debridement of LLE and closure of multiple wounds    Surgeons   Dr. Macedo    Resident/Fellow/Other Assistant:  Brennon  Procedure Summary  Anesthesia: General  ASA: III  Anesthesia Staff: Anesthesiologist: Isai Waller DO  Anesthesia Resident: Adilson Brandon DO  Estimated Blood Loss: 10 mL  Intra-op Medications: * Intraprocedure medication information is unavailable because the case start and end events have not been set *           Anesthesia Record               Intraprocedure I/O Totals       None           Specimen: No specimens collected     Staff:   Circulator: Winston  Scrub Person: Rachel          Findings: Multiple wounds extending to subcutaneous tissue without bone, muscle or tendon involvement. Medial - Superior 9 cm, middle 4 cm, Inferior 7 cm, Lateral Superior 2, inferior 2 cm, Poserior Superior 6 cm, medial 4 cm, inferior 8 cm, Packed middle posterior wound due to size    Complications:  None; patient tolerated the procedure well.     Disposition: PACU - hemodynamically stable.  Condition: stable  Specimens Collected: No specimens collected  Attending Attestation:     Talon Gregorio  Phone Number: [phone]

## 2024-06-03 NOTE — ED PROVIDER NOTES
HPI   Chief Complaint   Patient presents with    Animal Bite       HPI  The patient is a 53-year-old female with past medical history of hypertension, hyperlipidemia and non-insulin-dependent type 2 diabetes presenting to the emergency department as a transfer from [facility] after sustaining multiple dog bites.  Patient got in between her 2 pit bulls as they were fighting each other and she was bitten the left forearm and left calf area.  She was seen at [facility] where x-rays showed there was no underlying fractures, her tetanus was updated and she was given 1 dose of Unasyn.  She was transferred here for trauma and orthopedic hand consultation as there seem to be a loss of function in the left hand distal to the bite.Currently, patient grades the pain is a 10 out of 10 and states that her pain has rebounded since her last dose of pain meds.    PMH:as above.  Meds:reviewed in EMR.  PSH:reviewed in EMR.  allergies:reviewed in EMR.  social:Denies X3.  Family History: non-contributory to acute presentation.    A full 10 point Review of Systems was reviewed with the patient and is negative unless stated in the HPI.                  Ericka Coma Scale Score: 15                     Patient History   Past Medical History:   Diagnosis Date    Abnormal Pap smear of cervix     Colon polyps     Depression     Diabetes (Multi)     High cholesterol     Hypertension     Sleep apnea     Urinary tract infection     Varicella      Past Surgical History:   Procedure Laterality Date    APPENDECTOMY      BREAST BIOPSY       SECTION, CLASSIC       Section     SECTION, CLASSIC       SECTION, CLASSIC      baby passed away    CONDYLOMA EXCISION/FULGURATION      OTHER SURGICAL HISTORY  10/23/2014    Bunion Correction By [place] Procedure     Family History   Problem Relation Name Age of Onset    Dementia Mother Fely     Coronary artery disease Mother Fely     Heart attack Mother  Fely     Other (Uterine leiomyoma) Mother Fely     Arthritis Mother Fely     Depression Mother Fely     Hearing loss Mother Fely     Prostate cancer Father Chato         in remission heart vave replacment in 2/2023    Atrial fibrillation Father Chato     Anxiety disorder Brother      Depression Brother      Migraines Daughter      Other (bruises easily) Daughter       Social History     Tobacco Use    Smoking status: Former     Types: Cigarettes     Passive exposure: Past    Smokeless tobacco: Never    Tobacco comments:     Quit 2018   Vaping Use    Vaping status: Never Used   Substance Use Topics    Alcohol use: Yes     Alcohol/week: 2.0 standard drinks of alcohol     Types: 2 Glasses of wine per week     Comment: Drink occasionally    Drug use: Never       Physical Exam   ED Triage Vitals [06/02/24 1946]   Temperature Heart Rate Respirations BP   36.8 °C (98.3 °F) 86 17 (!) 147/99      Pulse Ox Temp src Heart Rate Source Patient Position   99 % -- -- --      BP Location FiO2 (%)     -- --       Physical Exam    Physical Exam:    Appearance: Alert, oriented , cooperative,  in no acute distress. Well nourished & well hydrated.    Skin: Intact,  dry skin, no lesions, rash, petechiae or purpura.     Eyes: PERRLA, EOMs intact,  No scleral injection. No scleral icterus.     ENT: Hearing grossly intact. External auditory canals patent. Nares patent, mucus membranes moist. Dentition without lesions.     Neck: Supple, without meningismus. Trachea at midline.     Pulmonary: Clear bilaterally with good chest wall excursion. No rales, rhonchi or wheezing. No accessory muscle use or stridor.    Cardiac: Normal S1, S2 without murmur, rub, gallop or extrasystole. No JVD, Carotids without bruits.    Abdomen: Soft, nontender.  No palpable organomegaly.  No rebound or guarding.      Genitourinary: Exam deferred.    Musculoskeletal: There are multiple, large, gaping lacerations in the left dorsal and  volar forearm.  There is visible extrusion of muscle tissue through the dorsal lacerations.  Radial pulses 2+.  Cap refills less than 2 seconds on the left.  Sensation light touch intact in the median, ulnar and radial nerve distributions.  Patient unable to really fan the fingers or give a thumbs up.  Very weak to wrist extension.  She is able to make an A-OK sign.    There are multiple lacerations in the left calf.  Not actively bleeding.  DP and PT pulses are 2+ distally sensation light touch is intact in motor function is grossly intact distally in the left arm.      Neurological:  Moving all 4 extremities equally, no focal findings identified    Psychiatric: Appropriate mood and affect.     ED Course & MDM        Medical Decision Making  The patient is a 53-year-old female with hypertension, hyperlipidemia and diabetes who presents emergency department as a transfer from [facility] after sustaining multiple dog bite wounds on her left forearm and left calf in order to undergo orthopedic and trauma surgical consultation and likely operative intervention.  Patient was hypertensive on arrival to 140s over 90s otherwise hemodynamically stable and afebrile.  She had extensive wounds to the left forearm and left lower leg between the level of the knee and ankle.  In particular, she had significant extrusion of extensor musculature through the dorsal lacerations.  She was vascularly intact however had difficulty with finger/wrist extension and abduction.  Concern for motor nerve versus musculotendinous injury.  Tetanus was already up-to-date at outside hospital and x-rays already showed no evidence of fracture which would be considered open in the setting.    Type and screen obtained and pain medication was administered.  Trauma and Ortho hand saw the patient and planned a joint procedure during which trauma will repair the lower leg and orthopedics would tend to the forearm and hand.  Patient was admitted to trauma  surgical floor at this point however she would first be taken to the operating room.  She was in stable condition at time of admission.      Patient was discussed with Dr. Salomon who agrees.      Jose oCnway MD  Emergency Medicine, PGY3    Procedure  Procedures     Coy Conway MD  Resident  06/02/24 3940

## 2024-06-03 NOTE — OP NOTE
EXPLORATION RIGHT FOREARM / IRRIGATION AND DEBRIDMENT OF LEFT UPPER EXTREMITY WOUNDS AND CLOSURE / RADIAL NERVE NEOLYSIS (L) Operative Note     Date: 2024  OR Location: [facility] OR    Name: Vilma Grover, : 1970, Age: 53 y.o., MRN: [id number], Sex: female    Diagnosis  Pre-op Diagnosis     * Dog bite, initial encounter [W54.0XXA] Post-op Diagnosis     * Dog bite, initial encounter [W54.0XXA]     Procedures  EXPLORATION RIGHT FOREARM / IRRIGATION AND DEBRIDMENT OF LEFT UPPER EXTREMITY WOUNDS AND CLOSURE / RADIAL NERVE NEOLYSIS  06279 - MA DEBRIDEMENT BONE 1ST 20 SQ CM/<    EXPLORATION RIGHT FOREARM / IRRIGATION AND DEBRIDMENT OF LEFT UPPER EXTREMITY WOUNDS AND CLOSURE / RADIAL NERVE NEOLYSIS  46081 - MA DEBRIDEMENT MUSCLE &/FASCIA 1ST 20 SQ CM/<    EXPLORATION RIGHT FOREARM / IRRIGATION AND DEBRIDMENT OF LEFT UPPER EXTREMITY WOUNDS AND CLOSURE / RADIAL NERVE NEOLYSIS  38665 - MA DEBRIDEMENT SUBCUTANEOUS TISSUE 1ST 20 SQ CM/<    WASHOUT AND CLOSURE OF WOUNDS X 8 / WASHOUT AND PACKING OF WOUND X 1    MA DEBRIDEMENT MUSCLE &/FASCIA 1ST 20 SQ CM/< [00748]  MA NEURP MAJOR PRPH NRV ARM/LEG OPN OTH/THN SPEC [[id number]]  MA RECMPL WND SCALP,EXTR 2.6-7.5 CM [[id number]]  Surgeons   Panel 1:     * Talon Gregorio - Primary  Panel 2:     * Ronn Macedo - Primary    Resident/Fellow/Other Assistant:  Surgeons and Role:  Panel 1:     * Sherwin Willett MD - Assisting     * Edi Cochran MD - Resident - Assisting    Procedure Summary  Anesthesia: General  ASA: III  Anesthesia Staff: Anesthesiologist: Isai Waller DO  Anesthesia Resident: Adilson Brandon DO  Estimated Blood Loss: 10 mL  Intra-op Medications: * Intraprocedure medication information is unavailable because the case start and end events have not been set *           Anesthesia Record               Intraprocedure I/O Totals       None           Specimen: No specimens collected     Staff:   Circulator: Winston Mar Person: Rachel         Drains and/or Catheters: *  None in log *    Tourniquet Times:     Total Tourniquet Time Documented:  Arm - Upper (Left) - 3 minutes  Total: Arm - Upper (Left) - 3 minutes      Implants:     Findings: Complex left upper extremity soft tissue wounds volar and dorsal aspect of forearm with significant muscle loss of her finger extensors and avulsion of posterior interosseous nerve    Indications: Vilma Grover is an 53 y.o. female who is having surgery for Dog bite, initial encounter [W54.0XXA].      The patient was seen in the preoperative area. The risks, benefits, complications, treatment options, non-operative alternatives, expected recovery and outcomes were discussed with the patient. The possibilities of reaction to medication, pulmonary aspiration, injury to surrounding structures, bleeding, recurrent infection, the need for additional procedures, failure to diagnose a condition, and creating a complication requiring transfusion or operation were discussed with the patient. The patient concurred with the proposed plan, giving informed consent.  The site of surgery was properly noted/marked if necessary per policy. The patient has been actively warmed in preoperative area. Preoperative antibiotics have been ordered and given within 1 hours of incision. Venous thrombosis prophylaxis have been ordered including unilateral sequential compression device    Procedure Details:   Patient is a 53-year-old diabetic right-hand-dominant female transferred from an outside facility after sustaining dog bite injury to her left upper and left lower extremity.  The upper remedy injury was more complicated particularly related to the locations of her wounds, herniated muscle through her wounds and the fact that she was unable to extend her fingers and thumb to her left hand suggestive of a neurologic injury as well.  In addition she sustained some superficial wounds to the left lower extremity which were managed concomitantly by Dr. Macedo from the acute  care service and dictated under a separate operative report.  Preoperatively left upper extremity identified and marked for surgery.  Informed consent process was completed.    Patient was brought to the operating room placed supine on the operating table.  A timeout procedure was performed to verify the correct patient procedure and operative site.  Intravenous antibiotics were redosed.  General anesthetic was initiated in the left upper extremity and left lower extremity were then prepped and draped in usual sterile fashion.  For details of the surgical procedure to the left lower extremity please refer to the a separate operative report.    Left upper extremity was exsanguinated and a tourniquet was inflated to 250 mmHg.    We began with exploration of her dorsal forearm.  He had 3 or 4 transverse wounds over the proximal third of her forearm dorsally.  Each 1 of these wounds measured about 2 inches in length and there was herniated muscle tissue from the distalmost aspect of these wounds.  We made a longitudinal incision along the radial border of these wounds.  We then mobilized the flap ulnarly entering the dorsal compartment of the forearm.  We identified that the radial wrist extensors were in continuity.  We identified that the EDC muscle belly was ruptured and mid substance and significantly damaged.  The ECU muscle appeared to have normal appearance.  In the floor of the wound we identified the supinator muscle which also had some damage to it.  We identified the posterior interosseous nerve and then performed a neurolysis dissecting this nerve proximally and then distally into the wound field.  We identified avulsion of the posterior interosseous nerve from the EDC, EIP, APL, EPB and EPL muscles.  We did not identify a branch remaining intact to the ECU suggesting that all of these muscles were now denervated from avulsion mechanism.  Because of the combination of the denervation nerve avulsion injury  and the complex injury to the muscles themselves this was not a repairable situation.  At this time we made the decision to thoroughly debride the wound including nonviable muscle tissue in an effort to get the wounds to heal so that she would then be a candidate for tendon transfers to restore finger and thumb extension at a subsequent procedure in the future.  The dorsal wound was littered with dog hair as well.    We then turned our attention to the volar wound.  She had 3 transverse wounds over the FCU muscle belly and the proximal third of the forearm.  There was fat extruding from these wounds but no muscle tissue was extruding.  We made an incision along the radial border of these wounds longitudinally to gain further exposure.  We identified fascial disruption overlying the FCU and some frayed muscle tissue of the FCU but overall the FCU muscle was intact.  Preoperatively she did demonstrate normal median and ulnar motor and sensory function to the hand.  Now with all wounds explored we performed excisional debridement of nonviable skin edges, subcutaneous fat and muscle tissue.  There was much more significant muscle debridement through the traumatic dorsal wound.  All of the dog hair appeared to have been removed as well.  We then lavaged the volar and dorsal wounds with 3 L of saline.  We inspected the volar and dorsal wounds again and found no remaining nonviable tissue or foreign bodies.  Volar and dorsal wounds were then closed loosely in interrupted fashion.  Sterile bandages were applied after administration of local anesthetic around the surgical portion of his wounds.  The tourniquet was deflated.  The hand was well-perfused.  The patient was placed into a volar splint extending from the proximal forearm out to the tips of her fingers and thumb.  She was awoken from her anesthetic and transferred to recovery in stable condition.    Postoperatively she will be admitted to the acute care service for  completion of her IV antibiotics.  She will then be discharged home.  Will make arrangements for outpatient follow-up in roughly 2 weeks for wound check.  At that time we will refer her to occupational therapy for splinting, wound care, active and passive range of motion with efforts to maintain full passive motion of her fingers.  Once her wounds of healed we will make a better assessment clinically of which motors are nonfunctioning and then formulate a plan for ultimate tendon transfers to restore her lost motor function.        Complications:  None; patient tolerated the procedure well.    Disposition: PACU - hemodynamically stable.  Condition: stable         Additional Details:      Attending Attestation: I was present and scrubbed for the entire procedure.    Talon Gregorio  Phone Number: [phone]

## 2024-06-03 NOTE — PROGRESS NOTES
Physical Therapy    Physical Therapy Evaluation & Treatment    Patient Name: Vilma Grover  MRN: [id number]  Today's Date: 6/3/2024   Time Calculation  Start Time: 0930  Stop Time: 1005  Time Calculation (min): 35 min    Assessment/Plan   PT Assessment  End of Session Communication: Bedside nurse  Assessment Comment: Pt is a 53 y.o female who presents to PT with no strength, balance, or mobility deficits.  She ambulates independently with WBOS and no LOB.  She a/descends 1 step with R rail and 3 steps without rails to safely enter/exit the home.  Pt scores as a low fall risk on the Tinetti Balance Test.  At this time, pt does not have any acute PT needs nor does she need PT upon discharge.  PT will sign off but will remain available for reconsult should there be any changes.  End of Session Patient Position: Up in chair, Alarm on   IP OR SWING BED PT PLAN  Inpatient or Swing Bed: Inpatient  PT Plan  PT Plan: PT Eval only  PT Eval Only Reason: At baseline function (safe to return home, no acute PT needs identified)  PT Frequency: PT eval only  PT Discharge Recommendations: No further acute PT, No PT needed after discharge  Equipment Recommended upon Discharge:  (none)  PT Recommended Transfer Status: Independent  PT - OK to Discharge: Yes (eval complete and pt does not have any PT needs)      Subjective     General Visit Information:  General  Reason for Referral: dog bite injury to L forearm now s/p I&D, wound exploration on 6/2 with RADHA Meraz LE lacerations  Past Medical History Relevant to Rehab: Per chart, PMH includes depression, DM, HLD, HTN, SAUL  Prior to Session Communication: Bedside nurse  Patient Position Received: Up in chair, Alarm on  General Comment: Pt cleared for PT by RN.  Pt alert and agreeable to PT. +PIV, external catheter  Home Living:  Home Living  Type of Home: House  Lives With: Alone  Home Adaptive Equipment: None  Home Layout: Two level, Able to live on main level with  bedroom/bathroom  Home Access: Stairs to enter with rails  Entrance Stairs-Rails: Both  Entrance Stairs-Number of Steps: 1 step to enter house with B rails, 3 steps to enter kitchen with L rail  Prior Level of Function:  Prior Function Per Pt/Caregiver Report  Level of [place]: Independent with ADLs and functional transfers, Independent with homemaking with ambulation  Prior Function Comments: +driving, -falls  Precautions:  Precautions  UE Weight Bearing Status: Left Non-Weight Bearing (in splint)  Medical Precautions: Fall precautions  Vital Signs:  Vital Signs  SpO2: 98 % (on 2L O2 after amb)    Objective   Pain:  Pain Assessment  Pain Assessment: 0-10  Pain Score: 6  Pain Type: Acute pain  Pain Location: Leg  Pain Orientation: Left  Pain Interventions: Ambulation/increased activity, Repositioned, Rest  Response to Interventions: no change in pain reported  Cognition:  Cognition  Overall Cognitive Status: Within Functional Limits    General Assessments:  Activity Tolerance  Endurance: Endurance does not limit participation in activity    Sensation  Light Touch: No apparent deficits    Strength  Strength Comments: B LE strength grossly 4+/5 except B hip flexion 4/5  Coordination  Movements are Fluid and Coordinated: Yes    Postural Control  Postural Control: Within Functional Limits    Static Sitting Balance  Static Sitting-Balance Support: No upper extremity supported, Feet supported  Static Sitting-Level of Assistance: Independent  Dynamic Sitting Balance  Dynamic Sitting-Balance Support: Right upper extremity supported, Feet supported  Dynamic Sitting-Balance: Forward lean  Dynamic Sitting-Comments: Independent    Static Standing Balance  Static Standing-Balance Support: No upper extremity supported  Static Standing-Level of Assistance: Independent  Dynamic Standing Balance  Dynamic Standing-Balance Support: No upper extremity supported  Dynamic Standing-Balance: Turning  Dynamic Standing-Comments:  independent  Functional Assessments:  Bed Mobility  Bed Mobility: No (pt up in chair at start and end of session, denying difficulty with bed mobility and politely declining bed mobility during session)    Transfers  Transfer: Yes  Transfer 1  Transfer From 1: Chair with arms to  Transfer to 1: Stand  Technique 1: Sit to stand, Stand to sit  Transfer Device 1:  (R axillary crutch)  Transfer Level of Assistance 1: Contact guard, Close supervision  Trials/Comments 1: 1x CGA, 1x close supervision  Transfers 2  Transfer From 2: Chair with arms to  Transfer to 2: Stand  Technique 2: Sit to stand, Stand to sit  Transfer Device 2:  (no device)  Transfer Level of Assistance 2: Independent  Trials/Comments 2: 3x throughout session    Ambulation/Gait Training  Ambulation/Gait Training Performed: Yes  Ambulation/Gait Training 1  Surface 1: Level tile  Device 1:  (R axillary crutch)  Assistance 1: Contact guard  Quality of Gait 1: Wide base of support, Decreased step length  Comments/Distance (ft) 1: 3ft  Ambulation/Gait Training 2  Surface 2: Level tile  Device 2: No device  Assistance 2: Close supervision  Quality of Gait 2: Wide base of support  Comments/Distance (ft) 2: 275ft, no LOB  Treatments:  Therapeutic Activity  Therapeutic Activity Performed: Yes  Therapeutic Activity 1: pt education, assist with hygiene in standing due to incontinence, multiple transfers    Ambulation/Gait Training  Ambulation/Gait Training Performed: Yes  Ambulation/Gait Training 2  Surface 2: Level tile  Device 2: No device  Assistance 2: Close supervision  Quality of Gait 2: Wide base of support  Comments/Distance (ft) 2: 275ft, no LOB  Stairs  Stairs: Yes  Stairs  Rails 1: Right  Device 1: Railing  Assistance 1: Contact guard, Independent  Comment/Number of Steps 1: 1 step a/descending, CGA 1st trial, independent 2nd trial  Stairs 2  Rails 2: None (Comment)  Device 2: No device  Assistance 2: Contact guard, Independent  Comment/Number of Steps  2: 3 steps CGA 1st trial, independent 2nd trial. descending step-to gait pattern, no LOB  Outcome Measures:  [facility] Basic Mobility  Turning from your back to your side while in a flat bed without using bedrails: None  Moving from lying on your back to sitting on the side of a flat bed without using bedrails: None  Moving to and from bed to chair (including a wheelchair): None  Standing up from a chair using your arms (e.g. wheelchair or bedside chair): None  To walk in hospital room: None  Climbing 3-5 steps with railing: None  Basic Mobility - Total Score: 24    Tinetti  Sitting Balance: Steady, safe  Arises: Able without using arms  Attempts to Arise: Able to arise, one attempt  Immediate Standing Balance (First 5 Seconds): Steady without walker or other support  Standing Balance: Narrow stance without support  Nudged: Steady without walker or other support  Eyes Closed: Steady  Turned 360 Degrees: Steadiness: Steady  Turned 360 Degrees: Continuity of Steps: Continuous  Sitting Down: Uses arms or not a smooth motion  Balance Score: 15  Initiation of Gait: No hesitancy  Step Height: R Swing Foot: Right foot complete clears floor  Step Length: R Swing Foot: Passes left stance foot  Step Height: L Swing Foot: Left foot complete clears floor  Step Length: L Swing Foot: Passes right stance foot  Step Symmetry: Right and left step appear equal  Step Continuity: Steps appear continuous  Path: Straight without walking aid  Trunk: No sway, no flexion, no use of arms, no walking aid  Walking Time: Heels apart  Gait Score: 11  Total Score: 26        Education Documentation  Precautions, taught by Tiffani Lira V PT at 6/3/2024 11:36 AM.  Learner: Patient  Readiness: Acceptance  Method: Explanation  Response: Verbalizes Understanding  Comment: NWB L UE    Mobility Training, taught by Tiffani Lira V PT at 6/3/2024 11:36 AM.  Learner: Patient  Readiness: Acceptance  Method: Explanation  Response: Verbalizes  Understanding  Comment: NWB L UE    Education Comments  No comments found.

## 2024-06-03 NOTE — OP NOTE
EXPLORATION RIGHT FOREARM / IRRIGATION AND DEBRIDMENT OF LEFT UPPER EXTREMITY WOUNDS AND CLOSURE / RADIAL NERVE NEOLYSIS (L) Operative Note     Date: 2024  OR Location: [facility] OR    Name: Vilma Grover : 1970, Age: 53 y.o., MRN: [id number], Sex: female    Diagnosis  Pre-op Diagnosis     * Dog bite, initial encounter [W54.0XXA] Post-op Diagnosis     * Dog bite, initial encounter [W54.0XXA]     Procedures  Irrigation and closure of 8 wounds to the left leg, and washout and packing of 1 further    Surgeons   Ronn Macedo DO    Resident/Fellow/Other Assistant:  Surgeons and Role:  Panel 1:     * Sherwin Willett MD - Assisting    Procedure Summary  Anesthesia: General  ASA: III  Anesthesia Staff: Anesthesiologist: Isai Waller DO  Anesthesia Resident: Adilson Brandon DO  Estimated Blood Loss: 5mL  Intra-op Medications: * Intraprocedure medication information is unavailable because the case start and end events have not been set *           Anesthesia Record               Intraprocedure I/O Totals          Intake    lactated Ringer's infusion 300.00 mL    Total Intake 300 mL          Specimen: No specimens collected     Staff:   Circulator: Winston Mar Person: Rachel         Drains and/or Catheters: * None in log *    Tourniquet Times:     Total Tourniquet Time Documented:  Arm - Upper (Left) - 3 minutes  Total: Arm - Upper (Left) - 3 minutes      Implants:     Findings: Wounds full thickness of the skin. Do NOT involve fascia or muscle.     Indications: Vilma Grover is an 53 y.o. female who is having surgery for Dog bite, initial encounter [W54.0XXA].     The patient was seen in the preoperative area. The risks, benefits, complications, treatment options, non-operative alternatives, expected recovery and outcomes were discussed with the patient. The possibilities of reaction to medication, pulmonary aspiration, injury to surrounding structures, bleeding, recurrent infection, the need for  additional procedures, failure to diagnose a condition, and creating a complication requiring transfusion or operation were discussed with the patient. The patient concurred with the proposed plan, giving informed consent.  The site of surgery was properly noted/marked if necessary per policy. The patient has been actively warmed in preoperative area. Preoperative antibiotics have been ordered and given within 1 hours of incision. Venous thrombosis prophylaxis have been ordered including unilateral sequential compression device    Procedure Details: Appropriate consent was obtained.  The patient was taken to the operating room the orthopedics team performed a timeout.  We joined the patient already in progress as they had previously been intubated.  We prepped and draped the left lower extremity in the standard sterile fashion.    We evaluated the left lower extremity in the multiple wounds.  There were 3 wounds laterally, 3 wounds medially, and 3 wounds posteriorly that were significant and broke the skin.  We washed all the wounds in the left lower extremity copiously with sterile water.  We evaluated all of the wounds and noted that they did not approach the fascia, did go through the skin and some of the subcutaneous fat.  We loosely approximated with simple interrupted and vertical mattress sutures 3 wounds in the medial aspect of the left lower extremity as well as 3 wounds on the medial aspect of the left lower extremity.  The superior and inferior wounds in the posterior aspect of the leg we able to close.  The middle wound was approximately 2 and half to 3 cm in width and was not going to closed without undue tension.  We washed this out copiously and packed it with sterile Kerlix.  The patient tolerated our portion of the procedure well without complication.  There were no complications.  We scrubbed out of the procedure as orthopedics was still debriding the left upper extremity.  Their portion of the  procedure is dictated separately.I was present for the entirety of my portion of the procedure. These were class 4 wounds.   Complications:  None; patient tolerated the procedure well.    Disposition: PACU - hemodynamically stable.  Condition: stable         Additional Details: none    Attending Attestation: I was present and scrubbed for the entire procedure.    Ronn Macedo DO

## 2024-06-03 NOTE — PROGRESS NOTES
Occupational Therapy    Evaluation/ Treatment    Patient Name: Vilma Grover  MRN: [id number]  Today's Date: 6/3/2024       Assessment:  OT Assessment: Pt completed bed mobility w/ CGA . Pt completed sit-to-stand/stand-to-sit transfer w/ CGA. Pt took 5-6 steps to get to a chair. Pt educated on NWB precautions. Pt education on adaptive equipment to help complete ADLs.  Prognosis: Good  Evaluation/Treatment Tolerance: Patient tolerated treatment well  End of Session Communication: Bedside nurse  End of Session Patient Position: Up in chair, Alarm on  OT Assessment Results: Decreased ADL status, Decreased upper extremity range of motion, Decreased upper extremity strength  Prognosis: Good  Evaluation/Treatment Tolerance: Patient tolerated treatment well  Plan:  Treatment Interventions: ADL retraining, UE strengthening/ROM, Compensatory technique education  OT Frequency: 3 times per week  OT Discharge Recommendations: Low intensity level of continued care  OT - OK to Discharge: Yes (When medically ready)  Treatment Interventions: ADL retraining, UE strengthening/ROM, Compensatory technique education    Subjective   Current Problem:  1. Dog bite, initial encounter  Case Request Operating Room: Debridement Upper Extremity    Case Request Operating Room: Debridement Upper Extremity        General:  General  Reason for Referral: dog bite injury to L forearm now s/p I&D, wound exploration on 6/2 with RADHA Meraz LE lacerations  Past Medical History Relevant to Rehab: PMH includes depression, DM, HLD, HTN, SAUL  Prior to Session Communication: Bedside nurse  Patient Position Received: Bed, 3 rail up, Alarm on  General Comment: Pt pleasant and agreeable to session  Precautions:  UE Weight Bearing Status: Left Non-Weight Bearing  Medical Precautions: Fall precautions, Oxygen therapy device and L/min  Vital Signs:  BP: 108/69 (108/69 in supine. 139/88 sitting EOB)  Pain:  Pain Assessment  Pain Assessment: 0-10  Pain Score: 0 -  No pain    Objective   Cognition:  Overall Cognitive Status: Within Functional Limits  Orientation Level: Oriented X4    Home Living:  Type of Home: House  Lives With: Alone (Pt reports living with a dog)  Home Adaptive Equipment: None  Home Layout: Two level, Able to live on main level with bedroom/bathroom  Home Access: Stairs to enter with rails  Entrance Stairs-Number of Steps: 1 steep step to enter the house. 3 steps to give into the kitchen  Bathroom Shower/Tub: Tub/shower unit  Bathroom Toilet: Standard  Bathroom Equipment: None  Prior Function:  Level of [place]: Independent with ADLs and functional transfers, Independent with homemaking with ambulation  ADL Assistance: Independent  Homemaking Assistance: Independent  Ambulatory Assistance: Independent  Hand Dominance: Right  IADL History:  Homemaking Responsibilities: Yes  ADL:  Equipment: Reacher, Sock aid, Long-handled sponge (Pt educated on use of sockaid, reacher, and long handled sponge. Pt educated how to put on pants and doff socks with reacher. Pt educated on how to use sock aid. Pt educated on long-handled sponge and tub transfer bench to shower.)  Eating Assistance: Stand by (Anticipate)  Eating Deficit: Setup  Grooming Assistance: Stand by (Anticipate)  Grooming Deficit: Setup  Bathing Assistance: Moderate (Anticipate due to NWB LUE and LUE and LLE not being able to get wet)  Bathing Deficit: Setup, Supervision/safety, Verbal cueing, Steadying  UE Dressing Assistance: Minimal (Anticipate)  UE Dressing Deficit: Setup, Increased time to complete  LE Dressing Assistance: Moderate (Anticipate)  Toileting Assistance with Device: Moderate (Anticipate)  Functional Assistance: Minimal  ADL Comments: Anticipate based on pt's NWB LUE. Pt was educated on equipment to aid in completing ADLs while maintain NWB    LE Dressing:  LE dressing level of assistance: Moderate   LE dressing comments: Pt educated on use of sock aid and reacher to jordan/doff LE  clothing. Pt educated that affected leg goes in the sleeve first.     Activity Tolerance:  Endurance: Endurance does not limit participation in activity  Bed Mobility/Transfers:   Bed Mobility  Bed Mobility: Yes  Bed Mobility 1  Bed Mobility 1: Supine to sitting  Level of Assistance 1: Contact guard    Transfers  Transfer: Yes  Transfer 1  Transfer From 1: Bed to, Stand to  Transfer to 1: Stand, Chair with arms  Technique 1: Sit to stand, Stand to sit  Transfer Device 1: Walker  Transfer Level of Assistance 1: Contact guard  Functional Mobility:  Functional Mobility  Functional Mobility Performed: Yes  Sitting Balance:  Static Sitting Balance  Static Sitting-Balance Support: Feet supported, Right upper extremity supported  Static Sitting-Level of Assistance: Close supervision  Dynamic Sitting Balance  Dynamic Sitting-Balance Support: Right upper extremity supported, Feet supported  Standing Balance:  Static Standing Balance  Static Standing-Balance Support: Right upper extremity supported  Static Standing-Level of Assistance: Contact guard  Dynamic Standing Balance  Dynamic Standing-Balance Support: Right upper extremity supported  Dynamic Standing-Comments: Pt took 5-6 steps to thechair   Coordination:  Movements are Fluid and Coordinated: Yes  Coordination Comment: Finger to thumb opposition on R hand was WFL   Hand Function:  Gross Grasp: Functional (R hand  WFL, L hand unable to assess due to bandages)  Coordination: Functional (R hand WFL, L hand unable to assess due to bandages)  Sensation: Unable to assess at this time  Extremities:   RUE   RUE : Within Functional Limits and   LUE   LUE:  (Unable to assess at this time due to bandages. Pt's Shoulder flexion is WFL. Pt reports not being able to extend fingers. Pt can move her thumb)    Outcome Measures:  [facility] Daily Activity  Putting on and taking off regular lower body clothing: A lot  Bathing (including washing, rinsing, drying): A lot  Putting on and taking  off regular upper body clothing: A little  Toileting, which includes using toilet, bedpan or urinal: A lot  Taking care of personal grooming such as brushing teeth: A little  Eating Meals: A little  Daily Activity - Total Score: 15     and OT Adult Other Outcome Measures  4AT: negative    Education Documentation  Precautions, taught by FLORA Hope at 6/3/2024  2:27 PM.  Learner: Patient  Readiness: Acceptance  Method: Explanation  Response: Verbalizes Understanding    ADL Training, taught by FOLRA Hope at 6/3/2024  2:27 PM.  Learner: Patient  Readiness: Acceptance  Method: Explanation  Response: Verbalizes Understanding    Education Comments  No comments found.        OP EDUCATION:       Goals:  Encounter Problems       Encounter Problems (Active)       ADLs       Patient will perform UB and LB bathing with modified independent level of assistance and shower chair and long-handled sponge.       Start:  06/03/24    Expected End:  06/24/24            Patient with complete lower body dressing with modified independent level of assistance donning and doffing all LE clothes  with PRN adaptive equipment while edge of bed        Start:  06/03/24    Expected End:  06/24/24            Patient will complete toileting including hygiene clothing management/hygiene with modified independent level of assistance and grab bars.       Start:  06/03/24    Expected End:  06/24/24               COGNITION/SAFETY       Patient will recall and adhere to non weight bearing restrictions on LUE with all ADL activity in order to promote healing and safety with functional tasks       Start:  06/03/24    Expected End:  06/24/24                  Yessenia FLEMING

## 2024-06-03 NOTE — ANESTHESIA PREPROCEDURE EVALUATION
Patient: Vilma Grover    Procedure Information       Date/Time: 06/02/24 2100    Procedure: Debridement Upper Extremity (Left: Hand)    Location: [facility] OR 07 / [facility] OR    Surgeons: Talon Gregorio MD            Relevant Problems   Anesthesia (within normal limits)      Cardiac   (+) Benign essential hypertension   (+) Chest pain (Resolved)   (+) Hypercholesteremia   (+) Hyperlipidemia   (+) Hypertension      Pulmonary   (+) Obstructive sleep apnea syndrome      Neuro   (+) Anxiety disorder   (+) Depression      GI (within normal limits)      /Renal (within normal limits)      Liver (within normal limits)      Endocrine   (+) Diabetes mellitus type 2 in obese   (+) Obesity, morbid, BMI 50 or higher (Multi)   (+) Type 2 diabetes mellitus without complication, without long-term current use of insulin (Multi)      Hematology (within normal limits)      Musculoskeletal (within normal limits)      HEENT (within normal limits)      ID (within normal limits)      Skin (within normal limits)      GYN (within normal limits)      Musculoskeletal and Injuries   (+) Dog bite       Clinical information reviewed:   Tobacco  Allergies    Med Hx  Surg Hx   Fam Hx          NPO Detail:  No data recorded     Physical Exam    Airway  Mallampati: II  TM distance: >3 FB  Neck ROM: full     Cardiovascular    Dental - normal exam     Pulmonary    Abdominal   (+) obese             Anesthesia Plan    History of general anesthesia?: yes  History of complications of general anesthesia?: no    ASA 3 - emergent     general     The patient is not a current smoker.    intravenous induction   Postoperative administration of opioids is intended.  Trial extubation is planned.  Anesthetic plan and risks discussed with patient.  Use of blood products discussed with patient who consented to blood products.    Plan discussed with resident.

## 2024-06-03 NOTE — PROGRESS NOTES
Pharmacy Medication History Review    Vilma Grover is a 53 y.o. female admitted for Dog bite. Pharmacy reviewed the patient's [id number] medications and allergies for accuracy.    The list below reflects the updated PTA list. Comments regarding how patient may be taking medications differently can be found in the Admit Orders Activity  Prior to Admission Medications   Prescriptions Last Dose Informant   MULTIVITAMIN ORAL  Self   Sig: Take 1 tablet by mouth once daily. For 30 days   TURMERIC ORAL  Self   Sig: Take 1 capsule by mouth once daily.   aspirin ([place] Aspirin) 81 mg EC tablet  Self   Sig: Take 1 tablet (81 mg) by mouth once daily.   atorvastatin (Lipitor) 20 mg tablet  Self   Sig: Take 1 tablet (20 mg) by mouth once daily.   calcium carbonate/vitamin D3 (CALCIUM 600 + D,3, ORAL)  Self   Sig: Take 1 tablet by mouth once daily.   citalopram (CeleXA) 40 mg tablet  Self   Sig: TAKE ONE TABLET BY MOUTH EVERY DAY   cyanocobalamin (Vitamin B-12) 100 mcg tablet  Self   Sig: Take 1 tablet (100 mcg) by mouth once daily. For 30 days   dulaglutide 3 mg/0.5 mL pen injector  Self   Sig: Inject 3 mg under the skin 1 (one) time per week.   metFORMIN  mg 24 hr tablet  Self   Sig: Take 2 tablets (1,000 mg) by mouth once daily in the evening. Take with meals.   Patient taking differently: Take 2 tablets (1,000 mg) by mouth once daily with breakfast.   nystatin (Mycostatin) cream  Self   Sig: Apply 1 Application topically 2 times a day as needed (rash/irritation).   valsartan (Diovan) 320 mg tablet  Self   Sig: Take 1 tablet (320 mg) by mouth once daily.      Facility-Administered Medications: None       The list below reflects the updated allergy list. Please review each documented allergy for additional clarification and justification.  Allergies  Reviewed by Elias Demarco, PharmD on 6/3/2024        Severity Reactions Comments    Empagliflozin Not Specified Other Yeast    Erythromycin Base Not  Specified GI Upset Upset stomach    Lisinopril Low Cough Dry cough            Patient accepts M2B at discharge. Pharmacy has been updated to [facility].    Sources used to complete the med history include out patient fill history, OARRS, and patient interview   Reliable historian    Below are additional concerns with the patient's PTA list.  Patient reported she thought her dose of citalopram was reduced recently, however most recent fill from pharmacy was for citalopram 40mg daily  Will follow up with PCP after admission     Elias Demarco PharmD  Transitions of Care Pharmacist  [facility]  Please reach out via Secure Chat for questions, or if no response call [phone] or [facility]era [facility]

## 2024-06-03 NOTE — PROGRESS NOTES
"Orthopaedic Surgery Progress Note    S:    Evaluated post-operatively. Pain controlled on current regimen.  Denies any new onset numbness, tingling or weakness. Denies nausea, vomiting, chest pain, dyspnea, or calf tenderness. Denies any fever or chills.    O:  BP (!) 147/99   Pulse 86   Temp 36.8 °C (98.3 °F)   Resp 17   Ht 1.626 m (5' 4\")   Wt 141 kg (310 lb)   LMP 05/20/2024 (Approximate) Comment: last period was 6 mos ago  SpO2 99%   BMI 53.21 kg/m²     GEN - NAD, resting comfortably in hospital bed  HEENT - MMM, EOMI, NCAT   CV - RRR by peripheral palpation, limbs wwp  PULM - NWOB on RA  ABD - Non-distended  NEURO - ABREU spontaneously, CNs II - XII grossly intact  PSYCH - Appropriate mood and affect    MSK:  LUE:   -Post-operative dressing/splint in place without strikethrough bleeding.   -Fires AIN/ulnar distributions; unable to fire PIN distribution  -SILT axillary/radial/median/ulnar distributions  -hand wwp, brisk cap refill, 2+ radial pulse  -Compartments soft and compressible, no pain with passive stretch      A/P: 53 y.o. female PMH depression, DM, HLD, HTN, SAUL s/p I&D, wound exploration on 6/2 with Dr. Gregorio.    Plan:  - Weight bearing: NWB L arm in splint  - DVT ppx: SCDs, okay for chemo PPx per primary  - Diet: Okay for diet from orthopedic perspective   - Perioperative Antibiotics: 72 hours periop zosyn   - Drain: None  - Post-operative Imaging: None   - No plans to return to the OR with orthopedic surgery this admission, will plan for tendon transfers at a later date    This plan was discussed with the attending, Dr. Gregorio.    Edi Cochran MD, PGY-4  Orthopedic Surgery  [phone]  Angiodroid Chat Preferred    After 0700, this patient will be followed by the orthopedic hand team. Please message on epic chat with questions/updates.      First call: Devaughn Archuleta, PGY-2   Second call: Devaughn Hastings, PGY-4    Please call on call resident ([phone]) nights after 6pm and weekends.          "

## 2024-06-03 NOTE — PROGRESS NOTES
Met with patient and introduced myself as Care Coordinator and member of the discharge planning team.  She is s/p debridement of wounds L UE resulting from dog bites. She lives alone and plans to return home at time of discharge. She was independent in ADL's prior to hospitalization. Her PCP is Rachel Savage CNP. She uses [facility] in [place]. She does not feel she needs home care at this time. Care Coordinator will continue to follow for home going needs.  Pauline Dale RN

## 2024-06-03 NOTE — HOSPITAL COURSE
53F with history of HTN, HLD, DM transferred to [facility] for management of dog bites to LUE with motor deficit of digits of left hand and LLE.  Tetanus updated and given Unasyn.    Hand team consulted upon arrival.  Taken to the OR with orthopedic hand service, underwent exploration of left forearm, I&D, closure, and radial nerve neolysis with ortho hand.  Trauma washed out and closed LLE wounds which were subcutaneous and none violated tendon, muscle, or nerves.    Admitted to the trauma service post-operatively.  Given Zosyn.    Will require additional operative intervention with plan for tendon transfers with hand service at a later date. Patient nonweightbearing left upper extremity in a splint. Patient will transition from Zosyn to Augmentin for a 5-day course. Patient's hospital course complicated by hyperglycemia, resumed home meds as appropriate provided sliding scale insulin as necessary. Patient seen and evaluated by PT, determined to have no PT needs at discharge. Patient evaluated by OT, recommended low intensity level of continued care at discharge.

## 2024-06-04 LAB
ERYTHROCYTE [DISTWIDTH] IN BLOOD BY AUTOMATED COUNT: 15 % (ref 11.5–14.5)
GLUCOSE BLD MANUAL STRIP-MCNC: 116 MG/DL (ref 74–99)
GLUCOSE BLD MANUAL STRIP-MCNC: 142 MG/DL (ref 74–99)
GLUCOSE BLD MANUAL STRIP-MCNC: 155 MG/DL (ref 74–99)
GLUCOSE BLD MANUAL STRIP-MCNC: 87 MG/DL (ref 74–99)
HCT VFR BLD AUTO: 30.3 % (ref 36–46)
HGB BLD-MCNC: 9.6 G/DL (ref 12–16)
MCH RBC QN AUTO: 27.3 PG (ref 26–34)
MCHC RBC AUTO-ENTMCNC: 31.7 G/DL (ref 32–36)
MCV RBC AUTO: 86 FL (ref 80–100)
NRBC BLD-RTO: 0 /100 WBCS (ref 0–0)
PLATELET # BLD AUTO: 201 X10*3/UL (ref 150–450)
RBC # BLD AUTO: 3.52 X10*6/UL (ref 4–5.2)
WBC # BLD AUTO: 9.6 X10*3/UL (ref 4.4–11.3)

## 2024-06-04 PROCEDURE — 2500000004 HC RX 250 GENERAL PHARMACY W/ HCPCS (ALT 636 FOR OP/ED): Performed by: NURSE PRACTITIONER

## 2024-06-04 PROCEDURE — 2500000002 HC RX 250 W HCPCS SELF ADMINISTERED DRUGS (ALT 637 FOR MEDICARE OP, ALT 636 FOR OP/ED): Performed by: PHYSICIAN ASSISTANT

## 2024-06-04 PROCEDURE — 82947 ASSAY GLUCOSE BLOOD QUANT: CPT

## 2024-06-04 PROCEDURE — 2500000001 HC RX 250 WO HCPCS SELF ADMINISTERED DRUGS (ALT 637 FOR MEDICARE OP): Performed by: PHYSICIAN ASSISTANT

## 2024-06-04 PROCEDURE — 2500000001 HC RX 250 WO HCPCS SELF ADMINISTERED DRUGS (ALT 637 FOR MEDICARE OP): Performed by: NURSE PRACTITIONER

## 2024-06-04 PROCEDURE — 85027 COMPLETE CBC AUTOMATED: CPT | Performed by: PHYSICIAN ASSISTANT

## 2024-06-04 PROCEDURE — 1100000001 HC PRIVATE ROOM DAILY

## 2024-06-04 PROCEDURE — 36415 COLL VENOUS BLD VENIPUNCTURE: CPT | Performed by: PHYSICIAN ASSISTANT

## 2024-06-04 RX ORDER — OXYCODONE HYDROCHLORIDE 5 MG/1
5 TABLET ORAL EVERY 6 HOURS PRN
Qty: 12 TABLET | Refills: 0 | Status: SHIPPED | OUTPATIENT
Start: 2024-06-04 | End: 2024-06-07

## 2024-06-04 RX ORDER — OXYCODONE HYDROCHLORIDE 5 MG/1
2.5 TABLET ORAL EVERY 6 HOURS PRN
Status: DISCONTINUED | OUTPATIENT
Start: 2024-06-04 | End: 2024-06-05 | Stop reason: HOSPADM

## 2024-06-04 RX ORDER — AMOXICILLIN AND CLAVULANATE POTASSIUM 875; 125 MG/1; MG/1
1 TABLET, FILM COATED ORAL EVERY 12 HOURS SCHEDULED
Qty: 7 TABLET | Refills: 0 | Status: SHIPPED | OUTPATIENT
Start: 2024-06-04 | End: 2024-06-05

## 2024-06-04 RX ORDER — AMOXICILLIN 250 MG
2 CAPSULE ORAL NIGHTLY
Qty: 14 TABLET | Refills: 0 | Status: SHIPPED | OUTPATIENT
Start: 2024-06-04 | End: 2024-06-11

## 2024-06-04 RX ORDER — OXYCODONE HYDROCHLORIDE 5 MG/1
5 TABLET ORAL EVERY 6 HOURS PRN
Status: DISCONTINUED | OUTPATIENT
Start: 2024-06-04 | End: 2024-06-05 | Stop reason: HOSPADM

## 2024-06-04 RX ORDER — ACETAMINOPHEN 325 MG/1
975 TABLET ORAL EVERY 8 HOURS
Qty: 63 TABLET | Refills: 0 | Status: SHIPPED | OUTPATIENT
Start: 2024-06-04 | End: 2024-06-11

## 2024-06-04 RX ADMIN — ENOXAPARIN SODIUM 60 MG: 100 INJECTION SUBCUTANEOUS at 05:57

## 2024-06-04 RX ADMIN — AMOXICILLIN AND CLAVULANATE POTASSIUM 1 TABLET: 875; 125 TABLET, FILM COATED ORAL at 08:34

## 2024-06-04 RX ADMIN — VALSARTAN 320 MG: 320 TABLET ORAL at 08:33

## 2024-06-04 RX ADMIN — ENOXAPARIN SODIUM 60 MG: 100 INJECTION SUBCUTANEOUS at 17:25

## 2024-06-04 RX ADMIN — METFORMIN HYDROCHLORIDE 1000 MG: 500 TABLET, EXTENDED RELEASE ORAL at 08:34

## 2024-06-04 RX ADMIN — INSULIN LISPRO 3 UNITS: 100 INJECTION, SOLUTION INTRAVENOUS; SUBCUTANEOUS at 12:44

## 2024-06-04 RX ADMIN — Medication 1 TABLET: at 08:33

## 2024-06-04 RX ADMIN — ACETAMINOPHEN 975 MG: 325 TABLET ORAL at 08:34

## 2024-06-04 RX ADMIN — OXYCODONE HYDROCHLORIDE 5 MG: 5 TABLET ORAL at 15:46

## 2024-06-04 RX ADMIN — CITALOPRAM HYDROBROMIDE 40 MG: 40 TABLET ORAL at 08:34

## 2024-06-04 RX ADMIN — AMOXICILLIN AND CLAVULANATE POTASSIUM 1 TABLET: 875; 125 TABLET, FILM COATED ORAL at 20:12

## 2024-06-04 RX ADMIN — ACETAMINOPHEN 975 MG: 325 TABLET ORAL at 15:46

## 2024-06-04 RX ADMIN — ASPIRIN 81 MG: 81 TABLET, COATED ORAL at 08:33

## 2024-06-04 RX ADMIN — ATORVASTATIN CALCIUM 20 MG: 20 TABLET, FILM COATED ORAL at 08:34

## 2024-06-04 ASSESSMENT — PAIN - FUNCTIONAL ASSESSMENT
PAIN_FUNCTIONAL_ASSESSMENT: 0-10

## 2024-06-04 ASSESSMENT — PAIN SCALES - GENERAL
PAINLEVEL_OUTOF10: 5 - MODERATE PAIN
PAINLEVEL_OUTOF10: 0 - NO PAIN
PAINLEVEL_OUTOF10: 7
PAINLEVEL_OUTOF10: 3

## 2024-06-04 NOTE — CARE PLAN
The patient's goals for the shift include      The clinical goals for the shift include Patient will remain safe andd free from injury    Over the shift, the patient did not make progress toward the following goals. Barriers to progression include none. Recommendations to address these barriers include none  Problem: Pain  Goal: My pain/discomfort is manageable  Outcome: Progressing     Problem: Safety  Goal: Patient will be injury free during hospitalization  Outcome: Progressing  Goal: I will remain free of falls  Outcome: Progressing     Problem: Daily Care  Goal: Daily care needs are met  Outcome: Progressing     Problem: Psychosocial Needs  Goal: Demonstrates ability to cope with hospitalization/illness  Outcome: Progressing  Goal: Collaborate with me, my family, and caregiver to identify my specific goals  Outcome: Progressing     Problem: Discharge Barriers  Goal: My discharge needs are met  Outcome: Progressing     Problem: Pain  Goal: Takes deep breaths with improved pain control throughout the shift  Outcome: Progressing  Goal: Turns in bed with improved pain control throughout the shift  Outcome: Progressing  Goal: Walks with improved pain control throughout the shift  Outcome: Progressing  Goal: Performs ADL's with improved pain control throughout shift  Outcome: Progressing  Goal: Participates in PT with improved pain control throughout the shift  Outcome: Progressing  Goal: Free from opioid side effects throughout the shift  Outcome: Progressing  Goal: Free from acute confusion related to pain meds throughout the shift  Outcome: Progressing     Problem: Skin  Goal: Decreased wound size/increased tissue granulation at next dressing change  Outcome: Progressing  Goal: Participates in plan/prevention/treatment measures  Outcome: Progressing  Goal: Prevent/manage excess moisture  Outcome: Progressing  Goal: Prevent/minimize sheer/friction injuries  Outcome: Progressing  Goal: Promote/optimize  nutrition  Outcome: Progressing  Goal: Promote skin healing  Outcome: Progressing     Problem: Pain - Adult  Goal: Verbalizes/displays adequate comfort level or baseline comfort level  Outcome: Progressing     Problem: Safety - Adult  Goal: Free from fall injury  Outcome: Progressing     Problem: Discharge Planning  Goal: Discharge to home or other facility with appropriate resources  Outcome: Progressing     Problem: Chronic Conditions and Co-morbidities  Goal: Patient's chronic conditions and co-morbidity symptoms are monitored and maintained or improved  Outcome: Progressing     Problem: Diabetes  Goal: Achieve decreasing blood glucose levels by end of shift  Outcome: Progressing  Goal: Increase stability of blood glucose readings by end of shift  Outcome: Progressing  Goal: Decrease in ketones present in urine by end of shift  Outcome: Progressing  Goal: Maintain electrolyte levels within acceptable range throughout shift  Outcome: Progressing  Goal: Maintain glucose levels >70mg/dl to <250mg/dl throughout shift  Outcome: Progressing  Goal: No changes in neurological exam by end of shift  Outcome: Progressing  Goal: Learn about and adhere to nutrition recommendations by end of shift  Outcome: Progressing  Goal: Vital signs within normal range for age by end of shift  Outcome: Progressing  Goal: Increase self care and/or family involovement by end of shift  Outcome: Progressing  Goal: Receive DSME education by end of shift  Outcome: Progressing   .

## 2024-06-04 NOTE — DISCHARGE SUMMARY
Discharge Diagnosis  Dog bite    Issues Requiring Follow-Up  Multiple dog bites to left arm and left leg; will need follow up with ortho hand clinic and trauma surgery clinic.    Test Results Pending At Discharge  Pending Labs       No current pending labs.            Hospital Course  53F with history of HTN, HLD, DM transferred to [facility] for management of dog bites to LUE with motor deficit of digits of left hand and LLE.  Tetanus updated and given Unasyn.    Hand team consulted upon arrival.  Taken to the OR with orthopedic hand service, underwent exploration of left forearm, I&D, closure, and radial nerve neolysis with ortho hand.  Trauma washed out and closed LLE wounds which were subcutaneous and none violated tendon, muscle, or nerves.    Admitted to the trauma service post-operatively.  Given Zosyn.    Will require additional operative intervention with plan for tendon transfers with hand service at a later date. Patient nonweightbearing left upper extremity in a splint. Patient will transition from Zosyn to Augmentin for a 5-day course. Patient's hospital course complicated by hyperglycemia, resumed home meds as appropriate provided sliding scale insulin as necessary. Patient seen and evaluated by PT, determined to have no PT needs at discharge. Patient evaluated by OT, recommended low intensity level of continued care at discharge. HC ordered for wound care. Patient ultimately discharged home in stable condition with appropriate scripts and fu appointments with ortho hand and trauma surgery ordered. Patient instructed to call to make appointment if not called within 3 days of discharge.     Pertinent Physical Exam At Time of Discharge  Physical Exam  HENT:      Head: Atraumatic.      Nose: Nose normal.      Mouth/Throat:      Mouth: Mucous membranes are moist.   Eyes:      Extraocular Movements: Extraocular movements intact.      Pupils: Pupils are equal, round, and reactive to light.   Cardiovascular:       Rate and Rhythm: Normal rate and regular rhythm.      Pulses: Normal pulses.   Pulmonary:      Effort: Pulmonary effort is normal.      Breath sounds: Normal breath sounds.   Abdominal:      General: Abdomen is flat. There is no distension.      Palpations: Abdomen is soft.      Tenderness: There is no abdominal tenderness.   Musculoskeletal:         General: Tenderness present.      Comments: LUE in splint. Wiggles fingers, warm/perfused. Sensation grossly intact. LLE with multiple sutured wound to lateral, medial, and posterior calf, c/d/I. 3cm/2cm/2cm wound on left posterior calf with Kerlix packing in place.    Skin:     Capillary Refill: Capillary refill takes less than 2 seconds.   Neurological:      General: No focal deficit present.      Mental Status: She is alert and oriented to person, place, and time.      Sensory: No sensory deficit.      Motor: No weakness.   Psychiatric:         Mood and Affect: Mood normal.         Behavior: Behavior normal.         Home Medications     Medication List      START taking these medications     acetaminophen 325 mg tablet; Commonly known as: Tylenol; Take 3 tablets   (975 mg) by mouth every 8 hours for 7 days.   amoxicillin-pot clavulanate 875-125 mg tablet; Commonly known as:   Augmentin; Take 1 tablet by mouth every 12 hours for 7 doses.   oxyCODONE 5 mg immediate release tablet; Commonly known as: Roxicodone;   Take 1 tablet (5 mg) by mouth every 6 hours if needed for severe pain (7 -   10) or moderate pain (4 - 6) for up to 3 days.   sennosides-docusate sodium 8.6-50 mg tablet; Commonly known as:   Lluvia-Colace; Take 2 tablets by mouth once daily at bedtime for 7 days.     CHANGE how you take these medications     metFORMIN  mg 24 hr tablet; Commonly known as: Glucophage-XR; Take   2 tablets (1,000 mg) by mouth once daily in the evening. Take with meals.;   What changed: when to take this     CONTINUE taking these medications     atorvastatin 20 mg tablet;  Commonly known as: Lipitor   CALCIUM 600 + D(3) ORAL   citalopram 40 mg tablet; Commonly known as: CeleXA; TAKE ONE TABLET BY   MOUTH EVERY DAY   cyanocobalamin 100 mcg tablet; Commonly known as: Vitamin B-12   dulaglutide 3 mg/0.5 mL pen injector; Inject 3 mg under the skin 1 (one)   time per week.   MULTIVITAMIN ORAL   nystatin cream; Commonly known as: Mycostatin   [place] Aspirin 81 mg EC tablet; Generic drug: aspirin   TURMERIC ORAL   valsartan 320 mg tablet; Commonly known as: Diovan; Take 1 tablet (320   mg) by mouth once daily.       Outpatient Follow-Up  Future Appointments   Date Time Provider Department Center   6/7/2024  8:00 AM АЛЕКСАНДР Alves-CNP [id number] [facility]       Joe Main PA-C

## 2024-06-04 NOTE — PROGRESS NOTES
Spoke to patient about her wound care needs and her interest in home care. She expressed concerns with her ability to do her won dressing changes daily as well as care for herself. She says that her daughter doesn't do well with medical issues and would not help with dressings. Trauma PA was notified and was going to do patient's dressing. He will update me with discharge plan after speaking to patient.  Pauline Dale RN

## 2024-06-04 NOTE — PROGRESS NOTES
[facility] [place]  TRAUMA SERVICE - PROGRESS NOTE    Patient Name: Vilma Grover  MRN: [id number]  Admit Date: 602  : 1970  AGE: 53 y.o.   GENDER: female  ==============================================================================  MECHANISM OF INJURY:   53 YOF s/p dog bites    LOC (yes/no?): no  Anticoagulant / Anti-platelet Rx? (for what dx?): asa (cardioprotection)  Referring Facility Name (N/A for scene EMR run): [facility], [place]    INJURIES:   Lacerations with muscle exposure to left forearm with loss of motor function of digits  Lacerations LLE    OTHER MEDICAL PROBLEMS:  HTN  HLD  DM2    INCIDENTAL FINDINGS:  N/a    PROCEDURES:  : (hand) I&D left forearm  : (trauma) washout and closure of LLE wounds    ==============================================================================  TODAY'S ASSESSMENT AND PLAN OF CARE:  ## LUE wounds  - Ortho hand following       -> Zosyn x72h       -> Outpatient fu 2 weeks       -> NWB LUE in splint  - Augmentin today for 5d course  - Pain control with [place]. Tylenol, PRN Oxy 5/10 q4h  - PT/OT    ## LLE wounds  - Wound care; dressing changed /4am  - Will plan to change to Prevena Vac to discharge with home care and fu with trauma clinic in 2 weeks for wound check  - Pain control as above    ## PMHx   - Continue home ASA, Atorvastatin, Citalopram  - Start home Metformin this morning  - Start home Valsartan     ## FEN/GI  - DM diet  - BR  - Dc IVF  - ISS ACHS today    ## PPX  - SCDs  - LVX 60mg BID for BMI >50    Dispo: Continue care on RNF at this time. Likely dc tomorrow.    Total face to face time spent with patient/family of 20 minutes, with >50% of the time spent discussing plan of care/management, counseling/educating on disease processes, explaining results of diagnostic testing.    Patient discussed with attending, Dr. Marisela Main, PA-C  Trauma, Critical Care, and Acute Care Surgery  [id number]      ==============================================================================  CHIEF COMPLAINT / OVERNIGHT EVENTS:   No complaints     MEDICAL HISTORY / ROS:  Admission history and ROS reviewed. Pertinent changes as follows:  N/a    PHYSICAL EXAM:  Heart Rate:  [78-93]   Temp:  [36.2 °C (97.2 °F)-37 °C (98.6 °F)]   Resp:  [16-17]   BP: (110-133)/(68-80)   SpO2:  [93 %-97 %]   Physical Exam  HENT:      Head: Atraumatic.      Mouth/Throat:      Mouth: Mucous membranes are moist.   Eyes:      Extraocular Movements: Extraocular movements intact.      Pupils: Pupils are equal, round, and reactive to light.   Cardiovascular:      Rate and Rhythm: Normal rate.      Pulses: Normal pulses.   Pulmonary:      Effort: Pulmonary effort is normal. No respiratory distress.      Breath sounds: Normal breath sounds.   Chest:      Chest wall: No tenderness.   Abdominal:      General: There is no distension.      Palpations: Abdomen is soft.      Tenderness: There is no abdominal tenderness.   Musculoskeletal:      Cervical back: No tenderness.      Comments: LUE in splint. Able to wiggle all fingers. Sensation grossly intact throughout. Unable to assess radial pulse 2/2 splint application. Cap refill <2 seconds, warm/perfused digits.  LLE with multiple sutured wound to lateral and posterior calf. All clean/intact with minimal ss drainage. [id number] wound to posterior calf with WTD Kerlix in place.   Neurological:      General: No focal deficit present.      Mental Status: She is alert and oriented to person, place, and time.      Sensory: No sensory deficit.      Motor: No weakness.   Psychiatric:         Mood and Affect: Mood normal.         Behavior: Behavior normal.         IMAGING SUMMARY:  (summary of new imaging findings, not a copy of dictation)  No new imaging to review today.    LABS:  Results from last 7 days   Lab Units 06/04/24  1040 06/03/24  0633 06/02/24  1824   WBC AUTO x10*3/uL 9.6 15.0* 8.2   HEMOGLOBIN g/dL  9.6* 11.9* 12.8   HEMATOCRIT % 30.3* 37.8 40.7   PLATELETS AUTO x10*3/uL 201 256 265   NEUTROS PCT AUTO %  --   --  50.3   LYMPHS PCT AUTO %  --   --  40.4   MONOS PCT AUTO %  --   --  6.0   EOS PCT AUTO %  --   --  2.3     Results from last 7 days   Lab Units 06/02/24  1824   INR  1.0     Results from last 7 days   Lab Units 06/03/24  0633 06/02/24  1824   SODIUM mmol/L 137 137   POTASSIUM mmol/L 4.5 4.2   CHLORIDE mmol/L 100 100   CO2 mmol/L 25 25   BUN mg/dL 13 11   CREATININE mg/dL 0.88 0.70   CALCIUM mg/dL 8.6 8.9   PROTEIN TOTAL g/dL  --  6.9   BILIRUBIN TOTAL mg/dL  --  <0.2   ALK PHOS U/L  --  107   ALT U/L  --  33   AST U/L  --  30   GLUCOSE mg/dL 252* 171*     Results from last 7 days   Lab Units 06/02/24  1824   BILIRUBIN TOTAL mg/dL <0.2           I have reviewed all medications, laboratory results, and imaging pertinent for today's encounter.

## 2024-06-04 NOTE — CARE PLAN
The patient's goals for the shift include      The clinical goals for the shift include patient remains safe throughout the shift    Problem: Safety  Goal: Patient will be injury free during hospitalization  Outcome: Progressing     Problem: Safety  Goal: I will remain free of falls  Outcome: Progressing     Problem: Pain  Goal: My pain/discomfort is manageable  Outcome: Progressing     Problem: Daily Care  Goal: Daily care needs are met  Outcome: Progressing

## 2024-06-05 ENCOUNTER — DOCUMENTATION (OUTPATIENT)
Dept: HOME HEALTH SERVICES | Facility: HOME HEALTH | Age: 54
End: 2024-06-05
Payer: COMMERCIAL

## 2024-06-05 ENCOUNTER — HOME HEALTH ADMISSION (OUTPATIENT)
Dept: HOME HEALTH SERVICES | Facility: HOME HEALTH | Age: 54
End: 2024-06-05
Payer: COMMERCIAL

## 2024-06-05 VITALS
BODY MASS INDEX: 50.02 KG/M2 | RESPIRATION RATE: 16 BRPM | HEIGHT: 64 IN | TEMPERATURE: 97.5 F | OXYGEN SATURATION: 99 % | HEART RATE: 91 BPM | DIASTOLIC BLOOD PRESSURE: 72 MMHG | SYSTOLIC BLOOD PRESSURE: 103 MMHG | WEIGHT: 293 LBS

## 2024-06-05 LAB
GLUCOSE BLD MANUAL STRIP-MCNC: 102 MG/DL (ref 74–99)
GLUCOSE BLD MANUAL STRIP-MCNC: 164 MG/DL (ref 74–99)

## 2024-06-05 PROCEDURE — 2500000001 HC RX 250 WO HCPCS SELF ADMINISTERED DRUGS (ALT 637 FOR MEDICARE OP): Performed by: NURSE PRACTITIONER

## 2024-06-05 PROCEDURE — 2500000004 HC RX 250 GENERAL PHARMACY W/ HCPCS (ALT 636 FOR OP/ED): Performed by: NURSE PRACTITIONER

## 2024-06-05 PROCEDURE — 2500000001 HC RX 250 WO HCPCS SELF ADMINISTERED DRUGS (ALT 637 FOR MEDICARE OP): Performed by: PHYSICIAN ASSISTANT

## 2024-06-05 PROCEDURE — 97535 SELF CARE MNGMENT TRAINING: CPT | Mod: GO

## 2024-06-05 PROCEDURE — 82947 ASSAY GLUCOSE BLOOD QUANT: CPT

## 2024-06-05 RX ORDER — AMOXICILLIN AND CLAVULANATE POTASSIUM 875; 125 MG/1; MG/1
1 TABLET, FILM COATED ORAL EVERY 12 HOURS SCHEDULED
Qty: 5 TABLET | Refills: 0 | Status: SHIPPED | OUTPATIENT
Start: 2024-06-05 | End: 2024-06-08

## 2024-06-05 RX ADMIN — Medication 1 TABLET: at 08:50

## 2024-06-05 RX ADMIN — ENOXAPARIN SODIUM 60 MG: 100 INJECTION SUBCUTANEOUS at 05:04

## 2024-06-05 RX ADMIN — ATORVASTATIN CALCIUM 20 MG: 20 TABLET, FILM COATED ORAL at 08:50

## 2024-06-05 RX ADMIN — AMOXICILLIN AND CLAVULANATE POTASSIUM 1 TABLET: 875; 125 TABLET, FILM COATED ORAL at 08:50

## 2024-06-05 RX ADMIN — METFORMIN HYDROCHLORIDE 1000 MG: 500 TABLET, EXTENDED RELEASE ORAL at 08:50

## 2024-06-05 RX ADMIN — ACETAMINOPHEN 975 MG: 325 TABLET ORAL at 08:50

## 2024-06-05 RX ADMIN — VALSARTAN 320 MG: 320 TABLET ORAL at 08:50

## 2024-06-05 RX ADMIN — ACETAMINOPHEN 975 MG: 325 TABLET ORAL at 02:29

## 2024-06-05 RX ADMIN — CITALOPRAM HYDROBROMIDE 40 MG: 40 TABLET ORAL at 08:50

## 2024-06-05 RX ADMIN — ASPIRIN 81 MG: 81 TABLET, COATED ORAL at 08:50

## 2024-06-05 ASSESSMENT — PAIN - FUNCTIONAL ASSESSMENT
PAIN_FUNCTIONAL_ASSESSMENT: 0-10
PAIN_FUNCTIONAL_ASSESSMENT: 0-10

## 2024-06-05 ASSESSMENT — COGNITIVE AND FUNCTIONAL STATUS - GENERAL
HELP NEEDED FOR BATHING: A LITTLE
DAILY ACTIVITIY SCORE: 21
PERSONAL GROOMING: A LITTLE
DRESSING REGULAR LOWER BODY CLOTHING: A LITTLE

## 2024-06-05 ASSESSMENT — PAIN SCALES - GENERAL: PAINLEVEL_OUTOF10: 5 - MODERATE PAIN

## 2024-06-05 ASSESSMENT — ACTIVITIES OF DAILY LIVING (ADL): HOME_MANAGEMENT_TIME_ENTRY: 26

## 2024-06-05 NOTE — HH CARE COORDINATION
Home Care received a Referral for Nursing. We have processed the referral for a Start of Care on 06/10.     If you have any questions or concerns, please feel free to contact us at [phone]. Follow the prompts, enter your five digit zip code, and you will be directed to your care team on E[facility] 1.

## 2024-06-05 NOTE — PROGRESS NOTES
Occupational Therapy    OT Treatment & Discharge    Patient Name: Vilma Grover  MRN: [id number]  Today's Date: 6/5/2024  Time Calculation  Start Time: 1048  Stop Time: 1114  Time Calculation (min): 26 min         Assessment:  OT Assessment: Pt progressing well with functional tasks. Has achieved max rehab potential with current L UE restrictions.  Evaluation/Treatment Tolerance: Patient tolerated treatment well  End of Session Patient Position:  (edge of bed)  Evaluation/Treatment Tolerance: Patient tolerated treatment well  Plan:  Treatment Interventions: ADL retraining, UE strengthening/ROM, Compensatory technique education  OT Frequency: Other (Comment) (D/C OT)  OT Discharge Recommendations: No further acute OT, No OT needed after discharge (defer to ortho f/u for outpatient OT referral once liberated from splint/permitted distal ROM)  OT Recommended Transfer Status: Independent  OT - OK to Discharge: Yes  Treatment Interventions: ADL retraining, UE strengthening/ROM, Compensatory technique education    Subjective   Previous Visit Info:  OT Last Visit  OT Received On: 06/05/24  General:  General  Missed Visit: No  Family/Caregiver Present: No  Prior to Session Communication: Bedside nurse  Patient Position Received: Up in bathroom  General Comment: Pt pleasant throughout; demo good insight to compensatory techniques and overall recovery process. (L LE wound vac)  Precautions:  UE Weight Bearing Status: Left Non-Weight Bearing  Medical Precautions: Fall precautions  Splinting: L UE volar forearm based hand/wrist splint/ace  Vital Signs:     Pain:  Pain Assessment  Pain Assessment: 0-10  Pain Score:  (did not rate)  Pain Type: Acute pain  Pain Location: Leg  Pain Orientation: Left, Distal    Objective    Cognition:  Cognition  Overall Cognitive Status: Within Functional Limits  Orientation Level: Oriented X4  Insight: Within function limits    Activities of Daily Living:      Grooming  Grooming Where Assessed:  Pt is asking when she needed to schedule to come back to see you since bw was off? Edge of bed  Grooming Comments: steup to brush hair, total A to manage in ponytail    UE Bathing  UE Bathing Level of Assistance: Setup  UE Bathing Where Assessed: Edge of bed  UE Bathing Comments: sponge bathing    LE Bathing  LE Bathing Comments: edu on safe bathing techniques for L LE protection, fall prevention, pain management    UE Dressing  UE Dressing Level of Assistance: Setup, Distant supervision  UE Dressing Where Assessed: Edge of bed  UE Dressing Comments: doff gown, don gown simulated as t-shirt with min cues for sequencing strategies    LE Dressing  LE Dressing: Yes  Pants Level of Assistance: Minimum assistance  LE Dressing Where Assessed: Edge of bed  LE Dressing Comments: emphasis on threading techniques and wound vac management    Toileting  Toileting Level of Assistance: Independent  Toileting Comments: per pt report; up in restroom upon arrival    Bed Mobility/Transfers: Transfers  Transfer: Yes  Transfer 1  Transfer From 1: Stand to  Transfer to 1: Bed  Technique 1: Stand to sit  Transfer Level of Assistance 1: Independent      Functional Mobility:  Functional Mobility  Functional Mobility Performed: Yes  Functional Mobility 1  Surface 1: Level tile  Device 1: No device  Assistance 1: Independent  Sitting Balance:  Static Sitting Balance  Static Sitting-Balance Support: Feet supported  Static Sitting-Level of Assistance: Independent  Dynamic Sitting Balance  Dynamic Sitting-Balance Support: Feet supported  Dynamic Sitting-Balance: Forward lean, Reaching for objects  Dynamic Sitting-Comments: Ind     Therapy/Activity: Therapeutic Exercise  Therapeutic Exercise Performed: Yes  Therapeutic Exercise Activity 1: Edu on L UE HEP with emphasis on shoulder/elbow AROM, IP thumb PROM in extension. positioning strategies to prevent secondary impairment, importance of achieving full elbow extension and reducing risk of decreased soft tissue extensibility, edema management strategies; demo good  carryover        RUE   RUE : Within Functional Limits and LUE   LUE: Exceptions to WFL (assessment of wrist/digits limited by splint/ace however shoulder WFL, elbow ROM WFL; can flex thumb at IP, 0/5 thumb extension)    Outcome Measures:[facility] Daily Activity  Putting on and taking off regular lower body clothing: A little  Bathing (including washing, rinsing, drying): A little  Putting on and taking off regular upper body clothing: None  Toileting, which includes using toilet, bedpan or urinal: None  Taking care of personal grooming such as brushing teeth: A little  Eating Meals: None  Daily Activity - Total Score: 21        Education Documentation  Body Mechanics, taught by Althea Ornelas OT at 6/5/2024 12:42 PM.  Learner: Patient  Readiness: Acceptance  Method: Explanation  Response: Verbalizes Understanding, Demonstrated Understanding    Precautions, taught by Althea Ornelas OT at 6/5/2024 12:42 PM.  Learner: Patient  Readiness: Acceptance  Method: Explanation  Response: Verbalizes Understanding, Demonstrated Understanding    Home Exercise Program, taught by Althea Ornelas OT at 6/5/2024 12:42 PM.  Learner: Patient  Readiness: Acceptance  Method: Explanation  Response: Verbalizes Understanding, Demonstrated Understanding    ADL Training, taught by Althea Ornelas OT at 6/5/2024 12:42 PM.  Learner: Patient  Readiness: Acceptance  Method: Explanation  Response: Verbalizes Understanding, Demonstrated Understanding    Education Comments  No comments found.          Goals:  Encounter Problems       Encounter Problems (Active)       ADLs       Patient will perform UB and LB bathing with modified independent level of assistance and shower chair and long-handled sponge. (Progressing)       Start:  06/03/24    Expected End:  06/24/24            Patient with complete lower body dressing with modified independent level of assistance donning and doffing all LE clothes  with PRN adaptive equipment while edge  of bed  (Progressing)       Start:  06/03/24    Expected End:  06/24/24            Patient will complete toileting including hygiene clothing management/hygiene with modified independent level of assistance and grab bars. (Progressing)       Start:  06/03/24    Expected End:  06/24/24               COGNITION/SAFETY       Patient will recall and adhere to non weight bearing restrictions on LUE with all ADL activity in order to promote healing and safety with functional tasks (Progressing)       Start:  06/03/24    Expected End:  06/24/24                     RISA LAN OT

## 2024-06-05 NOTE — CARE PLAN
The patient's goals for the shift include      The clinical goals for the shift include Sit in chair for meals    Over the shift, the patient did not make progress toward the following goals. Barriers to progression include none. Recommendations to address these barriers include none  Problem: Pain  Goal: My pain/discomfort is manageable  Outcome: Progressing     Problem: Safety  Goal: Patient will be injury free during hospitalization  Outcome: Progressing  Goal: I will remain free of falls  Outcome: Progressing     Problem: Daily Care  Goal: Daily care needs are met  Outcome: Progressing     Problem: Psychosocial Needs  Goal: Demonstrates ability to cope with hospitalization/illness  Outcome: Progressing  Goal: Collaborate with me, my family, and caregiver to identify my specific goals  Outcome: Progressing     Problem: Discharge Barriers  Goal: My discharge needs are met  Outcome: Progressing     Problem: Pain  Goal: Takes deep breaths with improved pain control throughout the shift  Outcome: Progressing  Goal: Turns in bed with improved pain control throughout the shift  Outcome: Progressing  Goal: Walks with improved pain control throughout the shift  Outcome: Progressing  Goal: Performs ADL's with improved pain control throughout shift  Outcome: Progressing  Goal: Participates in PT with improved pain control throughout the shift  Outcome: Progressing  Goal: Free from opioid side effects throughout the shift  Outcome: Progressing  Goal: Free from acute confusion related to pain meds throughout the shift  Outcome: Progressing     Problem: Skin  Goal: Decreased wound size/increased tissue granulation at next dressing change  Outcome: Progressing  Goal: Participates in plan/prevention/treatment measures  Outcome: Progressing  Goal: Prevent/manage excess moisture  Outcome: Progressing  Goal: Prevent/minimize sheer/friction injuries  Outcome: Progressing  Goal: Promote/optimize nutrition  Outcome:  Progressing  Goal: Promote skin healing  Outcome: Progressing     Problem: Pain - Adult  Goal: Verbalizes/displays adequate comfort level or baseline comfort level  Outcome: Progressing     Problem: Safety - Adult  Goal: Free from fall injury  Outcome: Progressing     Problem: Discharge Planning  Goal: Discharge to home or other facility with appropriate resources  Outcome: Progressing     Problem: Chronic Conditions and Co-morbidities  Goal: Patient's chronic conditions and co-morbidity symptoms are monitored and maintained or improved  Outcome: Progressing     Problem: Diabetes  Goal: Achieve decreasing blood glucose levels by end of shift  Outcome: Progressing  Goal: Increase stability of blood glucose readings by end of shift  Outcome: Progressing  Goal: Decrease in ketones present in urine by end of shift  Outcome: Progressing  Goal: Maintain electrolyte levels within acceptable range throughout shift  Outcome: Progressing  Goal: Maintain glucose levels >70mg/dl to <250mg/dl throughout shift  Outcome: Progressing  Goal: No changes in neurological exam by end of shift  Outcome: Progressing  Goal: Learn about and adhere to nutrition recommendations by end of shift  Outcome: Progressing  Goal: Vital signs within normal range for age by end of shift  Outcome: Progressing  Goal: Increase self care and/or family involovement by end of shift  Outcome: Progressing  Goal: Receive DSME education by end of shift  Outcome: Progressing   .

## 2024-06-05 NOTE — DISCHARGE SUMMARY
Discharge Diagnosis  Dog bite    Issues Requiring Follow-Up  Multiple dog bites to left arm and left leg; will need follow up with ortho hand clinic and trauma surgery clinic.     Test Results Pending At Discharge  Pending Labs       No current pending labs.            Hospital Course  53F with history of HTN, HLD, DM transferred to [facility] for management of dog bites to LUE with motor deficit of digits of left hand and LLE.  Tetanus updated and given Unasyn.    Hand team consulted upon arrival.  Taken to the OR with orthopedic hand service, underwent exploration of left forearm, I&D, closure, and radial nerve neolysis with ortho hand.  Trauma washed out and closed LLE wounds which were subcutaneous and none violated tendon, muscle, or nerves.    Admitted to the trauma service post-operatively.  Given Zosyn.    Will require additional operative intervention with plan for tendon transfers with hand service at a later date. Patient nonweightbearing left upper extremity in a splint. Patient will transition from Zosyn to Augmentin for a 5-day course. Patient's hospital course complicated by hyperglycemia, resumed home meds as appropriate provided sliding scale insulin as necessary. Patient seen and evaluated by PT, determined to have no PT needs at discharge. Patient evaluated by OT, recommended low intensity level of continued care at discharge. Patient with 2cm/3cm/2cm laceration on posterior calf that was initially being packed. On day of discharge, packing changed to a Prevena WV which provided adequate coverage.  HC set up for patient to come to house on Monday, 6/10 to change Vac and look at wound. Patient then to fu with trauma clinic the following week for vac removal and evaluation of wounds and possible suture removal. Fu with ortho hand clinic also ordered prior to discharge.  Patient provided appropriate scripts and instructions at discharge.    Pertinent Physical Exam At Time of Discharge  Neuro: GCS 15, NVI  throughout  CV: LLE DP 2+. Unable to assess L radial pulse 2/2 splint application. Fingers warm/perfused and cap refill <2sec.  Resp: Breathing comfortably on RA, lungs CTAB  MSK: LUE in splint. Wiggles fingers, warm/perfused digits. Grossly NVI. LLE with multiple sutured lacerations to the medial and lateral calf, c/d/I with minimal serous drainage.  2cm x 3cm x2cm deep laceration to L posterior calf with healthy pink tissue at wound base. Prevena WV in place holding appropriate suction and all wounds covered.       Home Medications     Medication List      START taking these medications     acetaminophen 325 mg tablet; Commonly known as: Tylenol; Take 3 tablets   (975 mg) by mouth every 8 hours for 7 days.   amoxicillin-pot clavulanate 875-125 mg tablet; Commonly known as:   Augmentin; Take 1 tablet by mouth every 12 hours for 5 doses.   oxyCODONE 5 mg immediate release tablet; Commonly known as: Roxicodone;   Take 1 tablet (5 mg) by mouth every 6 hours if needed for severe pain (7 -   10) or moderate pain (4 - 6) for up to 3 days.   sennosides-docusate sodium 8.6-50 mg tablet; Commonly known as:   Lluvia-Colace; Take 2 tablets by mouth once daily at bedtime for 7 days.     CHANGE how you take these medications     metFORMIN  mg 24 hr tablet; Commonly known as: Glucophage-XR; Take   2 tablets (1,000 mg) by mouth once daily in the evening. Take with meals.;   What changed: when to take this     CONTINUE taking these medications     atorvastatin 20 mg tablet; Commonly known as: Lipitor   CALCIUM 600 + D(3) ORAL   citalopram 40 mg tablet; Commonly known as: CeleXA; TAKE ONE TABLET BY   MOUTH EVERY DAY   cyanocobalamin 100 mcg tablet; Commonly known as: Vitamin B-12   dulaglutide 3 mg/0.5 mL pen injector; Inject 3 mg under the skin 1 (one)   time per week.   MULTIVITAMIN ORAL   nystatin cream; Commonly known as: Mycostatin   Trilby Aspirin 81 mg EC tablet; Generic drug: aspirin   TURMERIC ORAL   valsartan 320  mg tablet; Commonly known as: Diovan; Take 1 tablet (320   mg) by mouth once daily.       Outpatient Follow-Up  No future appointments. Will require trauma clinic follow up.     Joe Main PA-C

## 2024-06-05 NOTE — PROGRESS NOTES
Patient is medically cleared for discharge home today with [facility] services for disposable wound vac care. She will need nursing to take the wound vac off on 6/10/24 that is when SOC with [facility] nursing is scheduled for. I will follow until discharged.

## 2024-06-06 ENCOUNTER — PATIENT OUTREACH (OUTPATIENT)
Dept: PRIMARY CARE | Facility: CLINIC | Age: 54
End: 2024-06-06
Payer: COMMERCIAL

## 2024-06-06 NOTE — PROGRESS NOTES
Discharge facility: [facility]  Discharge diagnosis: Dog bite   Issues Requiring Follow-Up  Multiple dog bites to left arm and left leg; will need follow up with ortho hand clinic and trauma surgery clinic    Admission date: 6/2/24  Discharge date: 6/5/24    PCP Appointment Date: Calling office to see if PCP would like to see her  Specialist Appointment Date: Calling to schedule  Hospital Encounter and Summary: Linked    See Discharge assessment below for further details    Medications  Medications reviewed with patient/caregiver?: Yes (6/6/2024  9:50 AM)  Is the patient having any side effects they believe may be caused by any medication additions or changes?: No (6/6/2024  9:50 AM)  Does the patient have all medications ordered at discharge?: Yes (6/6/2024  9:50 AM)  Care Management Interventions: Provided patient education (6/6/2024  9:50 AM)  Prescription Comments: Prescriptions sent for acetaminophen (Tylenol)  amoxicillin-pot clavulanate (Augmentin)  oxyCODONE (Roxicodone)  sennosides-docusate sodium (Lluvia-Colace) (6/6/2024  9:50 AM)  Is the patient taking all medications as directed (includes completed medication regime)?: Yes (6/6/2024  9:50 AM)  Care Management Interventions: Provided patient education (6/6/2024  9:50 AM)  Medication Comments: Instructed on new medications of acetaminophen (Tylenol)  amoxicillin-pot clavulanate (Augmentin)  oxyCODONE (Roxicodone)  sennosides-docusate sodium (Lluvia-Colace) (6/6/2024  9:50 AM)  Follow Up Tasks: -- (n/a) (6/6/2024  9:50 AM)    Appointments  Does the patient have a primary care provider?: Yes (6/6/2024  9:50 AM)  Care Management Interventions: -- (Patient calling to inquire if PCP wants to see her) (6/6/2024  9:50 AM)  Has the patient kept scheduled appointments due by today?: Yes (6/6/2024  9:50 AM)  Care Management Interventions: Advised to schedule with specialist (6/6/2024  9:50 AM)  Follow Up Tasks: -- (n/a) (6/6/2024  9:50 AM)    Self  Management  What is the home health agency?:  Home Care (6/6/2024  9:50 AM)  Has home health visited the patient within 72 hours of discharge?: Call prior to 72 hours (6/6/2024  9:50 AM)  Home Health Interventions: -- (n/a) (6/6/2024  9:50 AM)  What Durable Medical Equipment (DME) was ordered?: n/a (6/6/2024  9:50 AM)  Has all Durable Medical Equipment (DME) been delivered?: -- (n/a) (6/6/2024  9:50 AM)  DME Interventions: -- (n/a) (6/6/2024  9:50 AM)  Care Management Interventions: Notified PCP/provider (6/6/2024  9:50 AM)  Follow Up Tasks: -- (n/a) (6/6/2024  9:50 AM)    Patient Teaching  Does the patient have access to their discharge instructions?: Yes (6/6/2024  9:50 AM)  Care Management Interventions: Reviewed instructions with patient (6/6/2024  9:50 AM)  What is the patient's perception of their health status since discharge?: Improving (6/6/2024  9:50 AM)  Is the patient/caregiver able to teach back the hierarchy of who to call/visit for symptoms/problems? PCP, Specialist, Home Health nurse, Urgent Care, ED, 911: Yes (6/6/2024  9:50 AM)  Patient/Caregiver Education Comments: Instructed on hospital discharge instructions. Instructed on new and changed medications. Instructed if increased shortness of breath or chest pains to call 911. Provided my contact information and encouraged to call with any questions (6/6/2024  9:50 AM)    Wrap Up  Is the patient/caregiver familiar with Advance Care Planning?: Yes (6/6/2024  9:50 AM)  Would the patient like more information on Advance Care Planning?: No (6/6/2024  9:50 AM)  Wrap Up Additional Comments: Patient reports doing better since returning home. She is plannng to call and schedule follow up with specialist. She will also call PCP to see if PCP needs to see her. (6/6/2024  9:50 AM)

## 2024-06-07 ENCOUNTER — APPOINTMENT (OUTPATIENT)
Dept: PRIMARY CARE | Facility: CLINIC | Age: 54
End: 2024-06-07
Payer: COMMERCIAL

## 2024-06-10 ENCOUNTER — HOME CARE VISIT (OUTPATIENT)
Dept: HOME HEALTH SERVICES | Facility: HOME HEALTH | Age: 54
End: 2024-06-10
Payer: COMMERCIAL

## 2024-06-10 VITALS
RESPIRATION RATE: 16 BRPM | DIASTOLIC BLOOD PRESSURE: 90 MMHG | OXYGEN SATURATION: 98 % | HEART RATE: 84 BPM | SYSTOLIC BLOOD PRESSURE: 138 MMHG | TEMPERATURE: 98.5 F

## 2024-06-10 PROCEDURE — G0299 HHS/HOSPICE OF RN EA 15 MIN: HCPCS

## 2024-06-10 PROCEDURE — 0023 HH SOC

## 2024-06-10 ASSESSMENT — PAIN SCALES - PAIN ASSESSMENT IN ADVANCED DEMENTIA (PAINAD)
CONSOLABILITY: 0 - NO NEED TO CONSOLE.
NEGVOCALIZATION: 0
BODYLANGUAGE: 0
FACIALEXPRESSION: 0
FACIALEXPRESSION: 0 - SMILING OR INEXPRESSIVE.
NEGVOCALIZATION: 0 - NONE.
CONSOLABILITY: 0
BODYLANGUAGE: 0 - RELAXED.
BREATHING: 0
TOTALSCORE: 0

## 2024-06-10 ASSESSMENT — ENCOUNTER SYMPTOMS
PAIN LOCATION - RELIEVING FACTORS: REST
APPETITE LEVEL: GOOD
FATIGUE: 1
LOSS OF SENSATION IN FEET: 1
SHORTNESS OF BREATH: 1
OCCASIONAL FEELINGS OF UNSTEADINESS: 1
DEPRESSION: 0
PAIN LOCATION - PAIN SEVERITY: 5/10
STOOL FREQUENCY: LESS THAN DAILY
PERSON REPORTING PAIN: PATIENT
PAIN LOCATION - PAIN DURATION: CONSTANT
PAIN LOCATION: LEFT LEG
SUBJECTIVE PAIN PROGRESSION: GRADUALLY IMPROVING
HYPERTENSION: 1
DIZZINESS: 1
PAIN LOCATION - EXACERBATING FACTORS: MOVEMENTS
PAIN LOCATION - PAIN FREQUENCY: CONSTANT
HIGHEST PAIN SEVERITY IN PAST 24 HOURS: 7/10
PAIN LOCATION - EXACERBATING FACTORS: UNSURE
MUSCLE WEAKNESS: 1
LOWEST PAIN SEVERITY IN PAST 24 HOURS: 4/10
BOWEL PATTERN NORMAL: 1
PAIN LOCATION - RELIEVING FACTORS: REST
TINGLING: 1
PAIN: 1
DRY SKIN: 1
DYSPNEA ACTIVITY LEVEL: AFTER AMBULATING 10 - 20 FT
LAST BOWEL MOVEMENT: 67000
CHANGE IN APPETITE: UNCHANGED
PAIN LOCATION - PAIN FREQUENCY: CONSTANT
PAIN LOCATION - PAIN SEVERITY: 5/10
PAIN LOCATION - PAIN QUALITY: BURN
PAIN LOCATION - PAIN DURATION: CONSTANT
PAIN SEVERITY GOAL: 0/10
PAIN LOCATION: LEFT ARM
PAIN LOCATION - PAIN QUALITY: ACHE

## 2024-06-10 ASSESSMENT — ACTIVITIES OF DAILY LIVING (ADL)
AMBULATION ASSISTANCE: STAND BY ASSIST
OASIS_M1830: 05
ENTERING_EXITING_HOME: STAND BY ASSIST
AMBULATION ASSISTANCE: 1

## 2024-06-10 ASSESSMENT — LIFESTYLE VARIABLES: SMOKING_STATUS: 0

## 2024-06-11 ENCOUNTER — APPOINTMENT (OUTPATIENT)
Dept: OBSTETRICS AND GYNECOLOGY | Facility: CLINIC | Age: 54
End: 2024-06-11
Payer: COMMERCIAL

## 2024-06-17 ENCOUNTER — APPOINTMENT (OUTPATIENT)
Dept: ORTHOPEDIC SURGERY | Facility: CLINIC | Age: 54
End: 2024-06-17
Payer: COMMERCIAL

## 2024-06-17 ENCOUNTER — EVALUATION (OUTPATIENT)
Dept: OCCUPATIONAL THERAPY | Facility: CLINIC | Age: 54
End: 2024-06-17
Payer: COMMERCIAL

## 2024-06-17 VITALS — BODY MASS INDEX: 50.02 KG/M2 | HEIGHT: 64 IN | WEIGHT: 293 LBS

## 2024-06-17 DIAGNOSIS — S59.911A RIGHT FOREARM INJURY: ICD-10-CM

## 2024-06-17 DIAGNOSIS — S51.802A OPEN WOUND OF LEFT FOREARM WITH TENDON INVOLVEMENT, INITIAL ENCOUNTER: ICD-10-CM

## 2024-06-17 DIAGNOSIS — S56.902A OPEN WOUND OF LEFT FOREARM WITH TENDON INVOLVEMENT, INITIAL ENCOUNTER: ICD-10-CM
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
## 2024-06-17 PROCEDURE — 3051F HG A1C>EQUAL 7.0%<8.0%: CPT | Performed by: PHYSICIAN ASSISTANT

## 2024-06-17 PROCEDURE — 99024 POSTOP FOLLOW-UP VISIT: CPT | Performed by: PHYSICIAN ASSISTANT

## 2024-06-17 PROCEDURE — 4010F ACE/ARB THERAPY RXD/TAKEN: CPT | Performed by: PHYSICIAN ASSISTANT

## 2024-06-17 PROCEDURE — L3808 WHFO, RIGID W/O JOINTS: HCPCS | Performed by: OCCUPATIONAL THERAPIST

## 2024-06-17 PROCEDURE — 1036F TOBACCO NON-USER: CPT | Performed by: PHYSICIAN ASSISTANT

## 2024-06-17 ASSESSMENT — PAIN - FUNCTIONAL ASSESSMENT: PAIN_FUNCTIONAL_ASSESSMENT: NO/DENIES PAIN

## 2024-06-17 NOTE — PROGRESS NOTES
"  Occupational Therapy  Occupational Therapy Orthopedic Evaluation    Patient Name: Vilma Grover  MRN: [id number]  Today's Date: 6/17/2024       Insurance:  Visit number: 1 of 24  Authorization info: none required    Current Problem  1. Right forearm injury  Referral to Occupational Therapy        Referred by:  Dr. Gregorio  Reason for visit:  Orthosis fabrication for radial nerve injury    Subjective   Chief Complaint: Pt presents as walk-in patient for orthosis fabrication following first post operative visit with Dr. Gregorio 3  RICKY: Dog bite  DOI: 6-2-24  DOS: 6-2-24  Walk in Patient? Yes  Work Related Injury? No    Hand dominance: Right  Effected extremity: Left    Medical History Form: Reviewed (scanned into chart)  Prior Level of Function (PLOF)  Patient previously independent with all ADLs/IADLs    ADL/IADL Deficits:  Bathing, Dressing, Hygiene/Grooming, Hair care, Driving, Home mgt, Meal prep, and Work      Precautions:   NWB      Pain:   6-7  Location: dorsal and volar proximal forearm  Description: aching, sore, shooting, tingling      Patients Living Environment: Reviewed and no concern  Lives with:   Primary Language: English  Patient's Goal(s) for Therapy: \"regain function of my hand after the second surgery\"      Objective    - full composite fist  - wrist drop   - unable to actively extend MCPs  - 15-20 degrees L Wrist extension AROM      Wound:  - sutures intact    Edema:  -  min      Sensation:- hypersensitivity  - paraesthesias    Performance Impacted:   - ROM   - Joint mobility   - Strength   - Sensibility   - Pain   - Wound   - Scar    Outcome Measures:  Quick DASH:           TODAY'S TREATMENT    Fabricated custom orthosis: resting hand extension orthosis forearm based    EDUCATION: Risk/benefits discussed, Home exercise program, orthosis wear schedule/ purpose/ precautions, care of orthosis, wound care, edema control, activity modifications, joint protection, pain/symptom management, injury " pathology, and plan of care,        Orthosis purpose:  - protect  - position  - immobilize    Wear Schedule:  - At all times   - may remove for bathing/dressing/hygiene   - may remove for HEP   - during activity/work   - while sleeping    Assessment:   Pt is 53 year old female who experienced a severe dog bite from one of two of her own dogs which resulted in a radial nerve injury that will require a second surgery and is presenting today for orthosis fabrication with complaints of decreased strength, decreased ROM, radial wrist drop, and increased pain.  As a result of these impairments pt is having difficulty with upper body/lower body bathing and dressing tasks, difficulty with hair care tasks, is experiencing loss of sleep, and is having difficulty with home management and meal prep tasks.  Without skilled intervention patient is at risk for further loss of ROM, loss of strength, reduced ADL function, and is at risk for requiring caregiver assistance for self care completion.  Patient to benefit from skilled services to address the impairments found in order to return to independent prior level of function.      Plan:  Planned Interventions include: therapeutic exercise, self-care home management, manual therapy, therapeutic activities, , neuromuscular coordination, vasopneumatic, dry needling, and orthosis fabrication.  Frequency: 1-2 x Week  Duration: 6 Weeks  Goals: Set and discussed today      Goals - Patient will:       Goal 1) verbalize/demonstrate/understanding of diagnosis and precautions       Goal 2) verbalize purpose of orthosis, wearing schedule, care and precautions       Goal 3) don/doff orthosis correctly and independently       Goal 4) verbalize/demonstrate home program, activity modification and/or adaptive equipment    All goals set today and have been met.    Plan of care was developed with input and agreement by the patient      Katelynn Chamberlain OT

## 2024-06-17 NOTE — PROGRESS NOTES
Patient returns to clinic today for first postoperative visit after exploration of right forearm with I&D as well as radial nerve neurolysis performed on 6/2/2024.  Overall she is doing well pain is well-controlled.  Denies any fevers or chills.    Examination: Surgical/traumatic wounds are healing well there is no signs of any surrounding erythema or purulent drainage.  Mild blood-tinged drainage over the dorsal proximal forearm.  She has diffuse swelling extending out into the hand and digits.  Fingers are warm and perfused.  She lacks finger extension as well as wrist extension sitting at approximately resting 5 degree wrist flexion.  Lacks terminal finger flexion secondary to swelling.    Impression: Right forearm dog bite with a radial nerve injury    Plan: Postoperative bandage was removed today we did remove her stitches.  She may shower and get these incisions wet we discussed appropriate wound care.  She should continue with daily wound dressing as if she is having drainage.  She will see our occupational therapist today for fabrication of a splint given issues with wrist extension and finger extension.  She may come out of this to work on digital finger range of motion we discussed the importance of this as well as elevation to help with her swelling.  We did discuss that this will require more extensive surgery including tendon transfer in the future.  We will reevaluate her in our office in 3 to 4 weeks.    Lorraine Hernadez PA-C  Department of Orthopaedic Surgery  [facility]    Dictation performed with the use of voice recognition software. Syntax and grammatical errors may exist.

## 2024-06-19 ENCOUNTER — APPOINTMENT (OUTPATIENT)
Dept: SURGERY | Facility: CLINIC | Age: 54
End: 2024-06-19
Payer: COMMERCIAL

## 2024-06-19 ENCOUNTER — PATIENT OUTREACH (OUTPATIENT)
Dept: PRIMARY CARE | Facility: CLINIC | Age: 54
End: 2024-06-19

## 2024-06-19 ENCOUNTER — HOME CARE VISIT (OUTPATIENT)
Dept: HOME HEALTH SERVICES | Facility: HOME HEALTH | Age: 54
End: 2024-06-19
Payer: COMMERCIAL

## 2024-06-19 VITALS
HEART RATE: 84 BPM | SYSTOLIC BLOOD PRESSURE: 119 MMHG | HEIGHT: 64 IN | BODY MASS INDEX: 50.02 KG/M2 | RESPIRATION RATE: 16 BRPM | WEIGHT: 293 LBS | DIASTOLIC BLOOD PRESSURE: 78 MMHG
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
## 2024-06-19 DIAGNOSIS — Z48.02 VISIT FOR SUTURE REMOVAL: Primary | ICD-10-CM

## 2024-06-19 DIAGNOSIS — S81.852A DOG BITE OF LEFT LOWER LEG, INITIAL ENCOUNTER: ICD-10-CM

## 2024-06-19 DIAGNOSIS — Z51.89 VISIT FOR WOUND CARE: ICD-10-CM

## 2024-06-19 PROCEDURE — 99024 POSTOP FOLLOW-UP VISIT: CPT | Performed by: NURSE PRACTITIONER

## 2024-06-19 ASSESSMENT — PAIN SCALES - GENERAL: PAINLEVEL: 0-NO PAIN

## 2024-06-19 NOTE — PROGRESS NOTES
[facility]  TRAUMA CLINIC PROGRESS NOTE    Patient Name: Vilma Grover  MRN: [id number]  Admit Date:   : 1970  AGE: 53 y.o.   GENDER: female  ==============================================================================  MECHANISM OF INJURY:   Dog bite    INJURIES:   Multiple dog bites to left arm and left leg  Lacerations with muscle exposure to left forearm with loss of motor function of digits   Lacerations LLE    OTHER MEDICAL PROBLEMS:  HTN  HLD  DM2    PROCEDURES:  24: exploration right forearm with irrigation and debridement of the left upper extremity wounds and closure/ radial nerve neolysis  24: washout and closure of LLE wounds    ==============================================================================  TODAY'S ASSESSMENT AND PLAN OF CARE:  FOLLOW UP AFTER WASHOUT AND SUTURE CLOSURE OF LLE WOUNDS:  Sutures removed without issue.   Posterior lateral open wound was packed with 1/2 inch nugauze. The area of skin adjacent and inferior to this wound were both covered with xeroform. The wounds were covered with ABDs and wrapped with an ACE bandage. This dressing is to be changed 2 times a day.   It is ok to shower. Take dressing off before showering. Allow warm soapy water to wash over wound.  Do not scrub.  Pat dry and re-dress as described above.  FOLLOW UP/CALL  -2024 at 10 AM trauma/ACS follow up  -Has a follow-up with Ortho hand on 2024  - May return to work or school on N/A  - Return to clinic or ER sooner if pt. has any development of erythema, drainage, swelling, pain, fevers, or chills  - If you have questions or concerns that are not urgent, please feel free to call  [phone].  - Call [phone] to make additional appointment(s) as needed if unable to reschedule in office today    ==============================================================================  HISTORY OF PRESENT ILLNESS:  Vilma Grover is a 53-year-old  female who presents to clinic in follow-up after a hospitalization from Jovana 3, 2024 through June 5,2024 after sustaining a dog bite to her left upper extremity and left lower extremity.  Hand team was consulted upon arrival she was taken to the OR with Ortho hand to undergo an exploration of her left forearm, I&D, closure, radial nerve needle lysis.  At the same time trauma also washed out and closed her left lower extremity wounds.  All of these wounds were subcutaneous and did not violate any tendons, muscle, or nerves.  She was given Zosyn postoperatively.  PT OT determined she had no needs at discharge and she was discharged home with a Prevena wound VAC.    She has been doing well at home.  However she took off her Prevena last night.  She states that she does not have any pain.  She does have continued swelling.  She her sensation is intact and she is able to ambulate.  She is eating, drinking, voiding and having flatus, bowel movements.  She presents for suture removal.    MEDICAL HISTORY / ROS:  Admission history and ROS reviewed.   Patient denies:  fevers; chills; headache;  dizziness; chest pain; shortness of breath; nausea/vomiting/diarrhea/constipation; new/worsening abdominal pain or numbness/tingling/weakness of extremities.   Pertinent changes as follows:  NA    PHYSICAL EXAM:  GCS 15, alert and oriented x 3.  Her skin is warm and dry.  She is breathing comfortably on room air.  Her left upper extremity is in a volar splint.  Her left lower extremity has multiple wounds all that were well-approximated with suture.  There is a picture in the chart.  There there is a posterior wound that was mostly sutured closed that does have a 4 cm x 1 cm x 0.5 cm wound at the lateral edge.  The wound bed bleeds easily.  This wound was packed with half-inch Nu Gauze.  Medial to this wound is an area of tissue that appears to be eschar and looks like it may fall off soon.  This area was covered with Xeroform.  There  is no excessive fibrinous exudate.  It is noted that there is a lot of skin sloughing around each of the wounds.  Sensation is intact throughout the entire leg.  She has 2+ peripheral pulses.  There is continued mild edema noted.    LABS:  No results found for this or any previous visit (from the past 24 hour(s)).  MEDICATIONS:  Current Outpatient Medications   Medication Sig Dispense Refill    aspirin ([place] Aspirin) 81 mg EC tablet Take 1 tablet (81 mg) by mouth once daily.      atorvastatin (Lipitor) 20 mg tablet Take 1 tablet (20 mg) by mouth once daily.      calcium carbonate/vitamin D3 (CALCIUM 600 + D,3, ORAL) Take 1 tablet by mouth once daily.      citalopram (CeleXA) 40 mg tablet TAKE ONE TABLET BY MOUTH EVERY DAY 30 tablet 5    cyanocobalamin (Vitamin B-12) 100 mcg tablet Take 500 mcg by mouth once daily. For 30 days      dulaglutide 3 mg/0.5 mL pen injector Inject 3 mg under the skin 1 (one) time per week. 2 mL 11    metFORMIN  mg 24 hr tablet Take 2 tablets (1,000 mg) by mouth once daily in the evening. Take with meals. (Patient taking differently: Take 2 tablets (1,000 mg) by mouth once daily with breakfast.) 180 tablet 1    MULTIVITAMIN ORAL Take 1 tablet by mouth once daily. For 30 days      nystatin (Mycostatin) cream Apply 1 Application topically 2 times a day as needed (rash/irritation).      oxyCODONE (Roxicodone) 5 mg immediate release tablet Take 5 mg by mouth every 6 hours if needed for moderate pain (4 - 6). Indications: pain      TURMERIC ORAL Take 1 capsule by mouth once daily.      valsartan (Diovan) 320 mg tablet Take 1 tablet (320 mg) by mouth once daily. 90 tablet 1     No current facility-administered medications for this visit.       IMAGING SUMMARY:  (summary of new imaging findings, not a copy of dictation)  NA    I have reviewed all laboratory and imaging results ordered/pertinent for today's encounter.

## 2024-06-19 NOTE — PROGRESS NOTES
Follow up Call  after hospitalization.  At time of outreach call the patient feels as if their condition has improved . She was at trauma surgeons office and having sutures removed.  Addressed any questions or concerns.

## 2024-06-21 NOTE — PROGRESS NOTES
[facility]  TRAUMA CLINIC PROGRESS NOTE    Patient Name: Vilma Grover  MRN: [id number]  Admit Date:   : 1970  AGE: 53 y.o.   GENDER: female  ==============================================================================  CHIEF COMPLAINT:  Wound check    MECHANISM OF INJURY:   Dog bite     INJURIES:   Multiple dog bites to left arm and left leg  Lacerations with muscle exposure to left forearm with loss of motor function of digits   Lacerations LLE     OTHER MEDICAL PROBLEMS:  HTN  HLD  DM2     PROCEDURES:  24: exploration right forearm with irrigation and debridement of the left upper extremity wounds and closure/ radial nerve neolysis  24: washout and closure of LLE wounds  ==============================================================================  TODAY'S ASSESSMENT AND PLAN OF CARE:  WOUND CARE - LLE  - Take daily showers  - Allow warm, soapy water to wash over wound  - Do not scrub at the wound  - When out of the shower, gently pat the wound dry.  - Do not apply lotions, ointments or creams  - Avoid soaking in bodies of water (bathtub, hot tubs, pools, lakes, etc) until wound is completely healed  - No heavy lifting > 15 lbs until 6 weeks after surgery  - Please do not use any more xeroform, lotion or creams around the wounds.   - Place 4x4 over the wounds on the LLE and cover with paper tape, DO NOT TAPE ALL FOUR SIDES    RIGHT FOREARM   - Please continue to follow up with your orthopaedic surgeon     FOLLOW UP/CALL  - No trauma/ACS follow up   - May return to work or school on TBD by ortho  - Return to clinic or ER sooner if pt. has any development of erythema, drainage, swelling, pain, fevers, or chills  - If you have questions or concerns that are not urgent, please feel free to call  [phone].  - Call [phone] to make additional appointment(s) as needed if unable to reschedule in office  today    ==============================================================================  HISTORY OF PRESENT ILLNESS  Vilma Grover is a 53-year-old female who presents to clinic in follow-up after a hospitalization from Jovana 3, 2024 through June 5,2024 after sustaining a dog bite to her left upper extremity and left lower extremity.  Hand team was consulted upon arrival she was taken to the OR with Ortho hand to undergo an exploration of her left forearm, I&D, closure, radial nerve needle lysis.  At the same time trauma also washed out and closed her left lower extremity wounds.  All of these wounds were subcutaneous and did not violate any tendons, muscle, or nerves.  She was given Zosyn postoperatively.  PT OT determined she had no needs at discharge and she was discharged home with a Prevena wound VAC.     At her appointment on 06/19/2024 all sutures were removed without issue. Posterior lateral open wound was packed with 1/2 inch nugauze. The area of skin adjacent and inferior to this wound were both covered with xeroform. The wounds were covered with ABDs and wrapped with an ACE bandage. This dressing is to be changed 2 times a day.     Patient presents to clinic today for wound check of her LLE. She saw her hand surgeon yesterday (7/8). Orthopaedics plans to give the hand some more time for healing before a tendon transfer. Today patient is doing well. She is showering the wounds and does not express any concerns at this time.     MEDICAL HISTORY / ROS:  Admission history and ROS reviewed.   Patient denies:  fevers; chills; headache;  dizziness; chest pain; shortness of breath; nausea/vomiting/diarrhea/constipation; new/worsening abdominal pain or numbness/tingling/weakness of extremities.   Pertinent changes as follows:  NA    PHYSICAL EXAM:  GCS 15, A+OX3, RRR, S1, S2, CTA=, no increased WOB. Abd soft, nt, nd. MAEx4, MARCE 5/5 x4, no extremity edema noted. 2+pp. Healing wounds on LLE, no redness, drainage or  odor noted.    LABS:  No results found for this or any previous visit (from the past 24 hour(s)).  MEDICATIONS:  Current Outpatient Medications   Medication Sig Dispense Refill    aspirin ([place] Aspirin) 81 mg EC tablet Take 1 tablet (81 mg) by mouth once daily.      atorvastatin (Lipitor) 20 mg tablet Take 1 tablet (20 mg) by mouth once daily.      calcium carbonate/vitamin D3 (CALCIUM 600 + D,3, ORAL) Take 1 tablet by mouth once daily.      citalopram (CeleXA) 40 mg tablet TAKE ONE TABLET BY MOUTH EVERY DAY 30 tablet 5    cyanocobalamin (Vitamin B-12) 100 mcg tablet Take 5 tablets (500 mcg) by mouth once daily. For 30 days      dulaglutide 3 mg/0.5 mL pen injector Inject 3 mg under the skin 1 (one) time per week. 2 mL 11    metFORMIN  mg 24 hr tablet Take 2 tablets (1,000 mg) by mouth once daily in the evening. Take with meals. (Patient taking differently: Take 2 tablets (1,000 mg) by mouth once daily with breakfast.) 180 tablet 1    MULTIVITAMIN ORAL Take 1 tablet by mouth once daily. For 30 days      nystatin (Mycostatin) cream Apply 1 Application topically 2 times a day as needed (rash/irritation).      oxyCODONE (Roxicodone) 5 mg immediate release tablet Take 1 tablet (5 mg) by mouth every 6 hours if needed for moderate pain (4 - 6).      TURMERIC ORAL Take 1 capsule by mouth once daily.      valsartan (Diovan) 320 mg tablet Take 1 tablet (320 mg) by mouth once daily. 90 tablet 1     No current facility-administered medications for this visit.       IMAGING SUMMARY:  (summary of new imaging findings, not a copy of dictation)  NA    I have reviewed all laboratory and imaging results ordered/pertinent for today's encounter.

## 2024-06-26 ENCOUNTER — APPOINTMENT (OUTPATIENT)
Dept: SURGERY | Facility: CLINIC | Age: 54
End: 2024-06-26
Payer: COMMERCIAL

## 2024-06-27 ENCOUNTER — HOME CARE VISIT (OUTPATIENT)
Dept: HOME HEALTH SERVICES | Facility: HOME HEALTH | Age: 54
End: 2024-06-27
Payer: COMMERCIAL

## 2024-06-27 VITALS
DIASTOLIC BLOOD PRESSURE: 90 MMHG | SYSTOLIC BLOOD PRESSURE: 148 MMHG | TEMPERATURE: 98.2 F | HEART RATE: 76 BPM | RESPIRATION RATE: 16 BRPM | OXYGEN SATURATION: 97 %

## 2024-06-27 PROCEDURE — G0299 HHS/HOSPICE OF RN EA 15 MIN: HCPCS

## 2024-06-27 ASSESSMENT — PAIN SCALES - PAIN ASSESSMENT IN ADVANCED DEMENTIA (PAINAD)
FACIALEXPRESSION: 0 - SMILING OR INEXPRESSIVE.
NEGVOCALIZATION: 0 - NONE.
TOTALSCORE: 0
BODYLANGUAGE: 0 - RELAXED.
FACIALEXPRESSION: 0
CONSOLABILITY: 0 - NO NEED TO CONSOLE.
BREATHING: 0
CONSOLABILITY: 0
NEGVOCALIZATION: 0
BODYLANGUAGE: 0

## 2024-06-27 ASSESSMENT — ENCOUNTER SYMPTOMS
PERSON REPORTING PAIN: PATIENT
PAIN LOCATION - EXACERBATING FACTORS: UNSURE
PAIN LOCATION - PAIN SEVERITY: 1/10
PAIN LOCATION - PAIN QUALITY: BURN
PAIN LOCATION: LEFT LEG
PAIN LOCATION - RELIEVING FACTORS: UNSURE
LOWEST PAIN SEVERITY IN PAST 24 HOURS: 0/10
HIGHEST PAIN SEVERITY IN PAST 24 HOURS: 1/10
PAIN: 1
PAIN LOCATION - PAIN FREQUENCY: INTERMITTENT
PAIN LOCATION - PAIN DURATION: VARIES
PAIN SEVERITY GOAL: 0/10
SUBJECTIVE PAIN PROGRESSION: GRADUALLY IMPROVING

## 2024-06-27 ASSESSMENT — ACTIVITIES OF DAILY LIVING (ADL)
HOME_HEALTH_OASIS: 00
AMBULATION ASSISTANCE: INDEPENDENT
MONEY MANAGEMENT (EXPENSES/BILLS): INDEPENDENT
AMBULATION ASSISTANCE: 1
OASIS_M1830: 02

## 2024-07-01 ENCOUNTER — APPOINTMENT (OUTPATIENT)
Dept: ORTHOPEDIC SURGERY | Facility: CLINIC | Age: 54
End: 2024-07-01
Payer: COMMERCIAL

## 2024-07-06 ENCOUNTER — HOSPITAL ENCOUNTER (EMERGENCY)
Facility: HOSPITAL | Age: 54
Discharge: HOME | End: 2024-07-06
Attending: EMERGENCY MEDICINE
Payer: MEDICARE

## 2024-07-06 VITALS
WEIGHT: 293 LBS | BODY MASS INDEX: 50.02 KG/M2 | OXYGEN SATURATION: 99 % | HEART RATE: 85 BPM | RESPIRATION RATE: 18 BRPM | TEMPERATURE: 99.1 F | HEIGHT: 64 IN | SYSTOLIC BLOOD PRESSURE: 156 MMHG | DIASTOLIC BLOOD PRESSURE: 85 MMHG

## 2024-07-06 DIAGNOSIS — Z04.1 EXAM FOLLOWING MVC (MOTOR VEHICLE COLLISION), NO APPARENT INJURY: ICD-10-CM

## 2024-07-06 DIAGNOSIS — S13.4XXA WHIPLASH INJURY TO NECK, INITIAL ENCOUNTER: ICD-10-CM

## 2024-07-06 DIAGNOSIS — S06.0X0A CONCUSSION WITHOUT LOSS OF CONSCIOUSNESS, INITIAL ENCOUNTER: ICD-10-CM

## 2024-07-06 DIAGNOSIS — G44.86 CERVICOGENIC HEADACHE: ICD-10-CM
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
## 2024-07-06 PROCEDURE — 2500000001 HC RX 250 WO HCPCS SELF ADMINISTERED DRUGS (ALT 637 FOR MEDICARE OP): Performed by: EMERGENCY MEDICINE

## 2024-07-06 PROCEDURE — 2500000005 HC RX 250 GENERAL PHARMACY W/O HCPCS: Performed by: EMERGENCY MEDICINE

## 2024-07-06 PROCEDURE — 2500000002 HC RX 250 W HCPCS SELF ADMINISTERED DRUGS (ALT 637 FOR MEDICARE OP, ALT 636 FOR OP/ED): Performed by: EMERGENCY MEDICINE

## 2024-07-06 PROCEDURE — 99283 EMERGENCY DEPT VISIT LOW MDM: CPT

## 2024-07-06 RX ORDER — ACETAMINOPHEN 325 MG/1
650 TABLET ORAL EVERY 6 HOURS PRN
Qty: 30 TABLET | Refills: 0 | Status: SHIPPED | OUTPATIENT
Start: 2024-07-06 | End: 2024-07-16

## 2024-07-06 RX ORDER — ONDANSETRON 4 MG/1
4 TABLET, ORALLY DISINTEGRATING ORAL ONCE
Status: COMPLETED | OUTPATIENT
Start: 2024-07-06 | End: 2024-07-06

## 2024-07-06 RX ORDER — ORPHENADRINE CITRATE 100 MG/1
100 TABLET, EXTENDED RELEASE ORAL 2 TIMES DAILY PRN
Status: DISCONTINUED | OUTPATIENT
Start: 2024-07-06 | End: 2024-07-06 | Stop reason: HOSPADM

## 2024-07-06 RX ORDER — ONDANSETRON 4 MG/1
4 TABLET, ORALLY DISINTEGRATING ORAL EVERY 8 HOURS PRN
Qty: 20 TABLET | Refills: 0 | Status: SHIPPED | OUTPATIENT
Start: 2024-07-06 | End: 2024-07-13

## 2024-07-06 RX ORDER — LIDOCAINE 50 MG/G
1 PATCH TOPICAL DAILY
Qty: 14 PATCH | Refills: 0 | Status: SHIPPED | OUTPATIENT
Start: 2024-07-06 | End: 2024-07-20

## 2024-07-06 RX ORDER — NAPROXEN 250 MG/1
500 TABLET ORAL ONCE
Status: COMPLETED | OUTPATIENT
Start: 2024-07-06 | End: 2024-07-06

## 2024-07-06 RX ORDER — ORPHENADRINE CITRATE 100 MG/1
100 TABLET, EXTENDED RELEASE ORAL 2 TIMES DAILY PRN
Qty: 20 TABLET | Refills: 0 | Status: SHIPPED | OUTPATIENT
Start: 2024-07-06 | End: 2024-07-16

## 2024-07-06 RX ORDER — LIDOCAINE 560 MG/1
1 PATCH PERCUTANEOUS; TOPICAL; TRANSDERMAL ONCE
Status: DISCONTINUED | OUTPATIENT
Start: 2024-07-06 | End: 2024-07-06 | Stop reason: HOSPADM

## 2024-07-06 RX ORDER — ACETAMINOPHEN 325 MG/1
975 TABLET ORAL ONCE
Status: COMPLETED | OUTPATIENT
Start: 2024-07-06 | End: 2024-07-06

## 2024-07-06 RX ADMIN — LIDOCAINE 1 PATCH: 4 PATCH TOPICAL at 21:39

## 2024-07-06 RX ADMIN — NAPROXEN 500 MG: 250 TABLET ORAL at 21:40

## 2024-07-06 RX ADMIN — ACETAMINOPHEN 975 MG: 325 TABLET ORAL at 21:39

## 2024-07-06 RX ADMIN — ONDANSETRON 4 MG: 4 TABLET, ORALLY DISINTEGRATING ORAL at 21:40

## 2024-07-06 RX ADMIN — ORPHENADRINE CITRATE 100 MG: 100 TABLET, EXTENDED RELEASE ORAL at 21:44

## 2024-07-06 ASSESSMENT — LIFESTYLE VARIABLES
EVER FELT BAD OR GUILTY ABOUT YOUR DRINKING: NO
HAVE YOU EVER FELT YOU SHOULD CUT DOWN ON YOUR DRINKING: NO
EVER HAD A DRINK FIRST THING IN THE MORNING TO STEADY YOUR NERVES TO GET RID OF A HANGOVER: NO
TOTAL SCORE: 0
HAVE PEOPLE ANNOYED YOU BY CRITICIZING YOUR DRINKING: NO

## 2024-07-06 ASSESSMENT — PAIN DESCRIPTION - LOCATION: LOCATION: HEAD

## 2024-07-06 ASSESSMENT — PAIN DESCRIPTION - PAIN TYPE: TYPE: ACUTE PAIN

## 2024-07-06 ASSESSMENT — PAIN SCALES - GENERAL: PAINLEVEL_OUTOF10: 6

## 2024-07-06 ASSESSMENT — PAIN - FUNCTIONAL ASSESSMENT: PAIN_FUNCTIONAL_ASSESSMENT: 0-10

## 2024-07-06 NOTE — Clinical Note
Vilma Grover was seen and treated in our emergency department on 7/6/2024.  She may return to work on 07/09/2024.  Patient likely has sustained a concussion.  For the next 2 weeks she should avoid unnecessary screen time.  Should she develop dizziness nausea or headache while working with computers or other screens she should have a 20-minute break and take a Tylenol.     If you have any questions or concerns, please don't hesitate to call.      Cassie Cristina MD
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
## 2024-07-08 ENCOUNTER — APPOINTMENT (OUTPATIENT)
Dept: ORTHOPEDIC SURGERY | Facility: CLINIC | Age: 54
End: 2024-07-08
Payer: COMMERCIAL

## 2024-07-08 VITALS — HEIGHT: 64 IN | WEIGHT: 293 LBS | BODY MASS INDEX: 50.02 KG/M2

## 2024-07-08 DIAGNOSIS — S51.802A OPEN WOUND OF LEFT FOREARM WITH TENDON INVOLVEMENT, INITIAL ENCOUNTER: Primary | ICD-10-CM

## 2024-07-08 DIAGNOSIS — S56.902A OPEN WOUND OF LEFT FOREARM WITH TENDON INVOLVEMENT, INITIAL ENCOUNTER: Primary | ICD-10-CM

## 2024-07-08 PROCEDURE — 1036F TOBACCO NON-USER: CPT | Performed by: ORTHOPAEDIC SURGERY

## 2024-07-08 PROCEDURE — 99024 POSTOP FOLLOW-UP VISIT: CPT | Performed by: ORTHOPAEDIC SURGERY

## 2024-07-08 PROCEDURE — 3051F HG A1C>EQUAL 7.0%<8.0%: CPT | Performed by: ORTHOPAEDIC SURGERY

## 2024-07-08 PROCEDURE — 4010F ACE/ARB THERAPY RXD/TAKEN: CPT | Performed by: ORTHOPAEDIC SURGERY

## 2024-07-08 ASSESSMENT — PAIN - FUNCTIONAL ASSESSMENT: PAIN_FUNCTIONAL_ASSESSMENT: NO/DENIES PAIN

## 2024-07-08 NOTE — LETTER
July 8, 2024     Rachel Savage, APRN-CNP  [address]  [facility], [facility] [address]  [place] [address]    Patient: Vilma Grover   YOB: 1970   Date of Visit: 7/8/2024       Dear Dr. Rachel Savage, APRN-CNP:    Thank you for referring Vilma Grover to me for evaluation. Below are my notes for this consultation.  If you have questions, please do not hesitate to call me. I look forward to following your patient along with you.       Sincerely,     Talon Gregorio MD      CC: No Recipients  ______________________________________________________________________________________    [facility]  Hand and Upper Extremity Service  Post Operative visit         Date of surgery: 6/2/24    Surgery(s) performed: Right forearm exploration, irrigation and debridement of left upper extremity        Subjective report: Second postoperative visit. She had a dog bite injury to her forearm in June which resulted in a significant injury to her forearm extensor musculature and a segmental injury to her posterior interosseous nerve. She's been working on wound care and range of motion activities and is aware that secondary surgeries are necessary to restore her ability to extend fingers and thumb.        Exam findings: Right forearm reveals healing wounds to dorsal aspect of forearm and volar aspect proximally. Fibrinous exudate on volar wound. No signs of drainage or erythema. Dorsal wound is slightly more delayed in recovery. No purulence or active drainage. S scar over more radial aspect of wound that's still very firm to deeper structures. Mild dorsal hand and digital swelling. Able to demonstrate active wrist extension with associated radial deviation. No active finger MP joint extension or thumb extension. Good finger and thumb flexion. Sensation intact to medial and ulnar nerve distribution.        Radiograph findings: No new images obtained       Impression:  Sequela of dog bite injury to left forearm      Plan: She's going to need more time for secondary wound healing before the ability to be a candidate for tendon transfers. She'll continue with her therapy programs. Dressing supplies were provided. She'll return in 4 weeks for repeat clinical examination and wound check. Once the wounds have completely healed without concern for infection, we can proceed with tendon transfer surgery which will involve FCR transfer to finger extensors and then we'll have to figure out an appropriate transfer for her thumb because clinically it doesn't appear she has a palmaris longus tendon. We may have to take another tendon like ring or middle finger FDS tendon to use as a donor for thumb extension.        Follow Up: 4 weeks             Talon Gregorio MD    [facility] [employer]  Department of Orthopaedic Surgery  Chief of Hand and Upper Extremity Surgery  [facility]    Scribe Attestation  By signing my name below, ISepideh , Naz   attest that this documentation has been prepared under the direction and in the presence of Dr. Talon Gregorio.      Dictation performed with the use of voice recognition software. Syntax and grammatical errors may exist.

## 2024-07-08 NOTE — PROGRESS NOTES
[facility]  Hand and Upper Extremity Service  Post Operative visit         Date of surgery: 6/2/24    Surgery(s) performed: Right forearm exploration, irrigation and debridement of left upper extremity        Subjective report: Second postoperative visit. She had a dog bite injury to her forearm in June which resulted in a significant injury to her forearm extensor musculature and a segmental injury to her posterior interosseous nerve. She's been working on wound care and range of motion activities and is aware that secondary surgeries are necessary to restore her ability to extend fingers and thumb.        Exam findings: Right forearm reveals healing wounds to dorsal aspect of forearm and volar aspect proximally. Fibrinous exudate on volar wound. No signs of drainage or erythema. Dorsal wound is slightly more delayed in recovery. No purulence or active drainage. S scar over more radial aspect of wound that's still very firm to deeper structures. Mild dorsal hand and digital swelling. Able to demonstrate active wrist extension with associated radial deviation. No active finger MP joint extension or thumb extension. Good finger and thumb flexion. Sensation intact to medial and ulnar nerve distribution.        Radiograph findings: No new images obtained       Impression: Sequela of dog bite injury to left forearm      Plan: She's going to need more time for secondary wound healing before the ability to be a candidate for tendon transfers. She'll continue with her therapy programs. Dressing supplies were provided. She'll return in 4 weeks for repeat clinical examination and wound check. Once the wounds have completely healed without concern for infection, we can proceed with tendon transfer surgery which will involve FCR transfer to finger extensors and then we'll have to figure out an appropriate transfer for her thumb because clinically it doesn't appear she has a palmaris longus  tendon. We may have to take another tendon like ring or middle finger FDS tendon to use as a donor for thumb extension.        Follow Up: 4 weeks             Talon Gregorio MD    [employer] [employer]  Department of Orthopaedic Surgery  Chief of Hand and Upper Extremity Surgery  [facility]    Scribe Attestation  By signing my name below, I, Sepideh Naz Raymond   attest that this documentation has been prepared under the direction and in the presence of Dr. Talon Gregorio.      Dictation performed with the use of voice recognition software. Syntax and grammatical errors may exist.

## 2024-07-09 ENCOUNTER — APPOINTMENT (OUTPATIENT)
Dept: SURGERY | Facility: CLINIC | Age: 54
End: 2024-07-09
Payer: COMMERCIAL

## 2024-07-09 VITALS
SYSTOLIC BLOOD PRESSURE: 152 MMHG | WEIGHT: 293 LBS | HEIGHT: 64 IN | OXYGEN SATURATION: 98 % | BODY MASS INDEX: 50.02 KG/M2 | DIASTOLIC BLOOD PRESSURE: 94 MMHG | TEMPERATURE: 97.3 F | HEART RATE: 79 BPM

## 2024-07-09 DIAGNOSIS — Z51.89 ENCOUNTER FOR WOUND CARE: Primary | ICD-10-CM

## 2024-07-09 PROCEDURE — 99213 OFFICE O/P EST LOW 20 MIN: CPT | Performed by: PHYSICIAN ASSISTANT

## 2024-07-09 ASSESSMENT — ENCOUNTER SYMPTOMS
DEPRESSION: 0
OCCASIONAL FEELINGS OF UNSTEADINESS: 0
LOSS OF SENSATION IN FEET: 0

## 2024-07-09 ASSESSMENT — PAIN SCALES - GENERAL: PAINLEVEL: 0-NO PAIN

## 2024-07-17 ENCOUNTER — DOCUMENTATION (OUTPATIENT)
Dept: SURGERY | Facility: CLINIC | Age: 54
End: 2024-07-17
Payer: COMMERCIAL

## 2024-07-24 ENCOUNTER — PATIENT OUTREACH (OUTPATIENT)
Dept: PRIMARY CARE | Facility: CLINIC | Age: 54
End: 2024-07-24
Payer: COMMERCIAL

## 2024-07-26 ENCOUNTER — OFFICE VISIT (OUTPATIENT)
Dept: WOUND CARE | Facility: HOSPITAL | Age: 54
End: 2024-07-26
Payer: COMMERCIAL

## 2024-07-26 ENCOUNTER — APPOINTMENT (OUTPATIENT)
Dept: WOUND CARE | Facility: HOSPITAL | Age: 54
End: 2024-07-26
Payer: COMMERCIAL

## 2024-07-26 DIAGNOSIS — T14.8XXA PAIN ASSOCIATED WITH WOUND: ICD-10-CM

## 2024-07-26 DIAGNOSIS — E66.01 CLASS 3 SEVERE OBESITY DUE TO EXCESS CALORIES WITH SERIOUS COMORBIDITY AND BODY MASS INDEX (BMI) OF 50.0 TO 59.9 IN ADULT (MULTI): ICD-10-CM

## 2024-07-26 DIAGNOSIS — E11.65 TYPE 2 DIABETES MELLITUS WITH HYPERGLYCEMIA, WITHOUT LONG-TERM CURRENT USE OF INSULIN (MULTI): ICD-10-CM

## 2024-07-26 DIAGNOSIS — L53.9 REDNESS OF SKIN: ICD-10-CM

## 2024-07-26 DIAGNOSIS — S81.812D LACERATION OF LOWER LEG, LEFT, COMPLICATED, SUBSEQUENT ENCOUNTER: Primary | ICD-10-CM

## 2024-07-26 DIAGNOSIS — R52 PAIN ASSOCIATED WITH WOUND: ICD-10-CM

## 2024-07-26 PROCEDURE — 11042 DBRDMT SUBQ TIS 1ST 20SQCM/<: CPT

## 2024-07-26 PROCEDURE — 99214 OFFICE O/P EST MOD 30 MIN: CPT | Mod: 25

## 2024-07-26 PROCEDURE — 11045 DBRDMT SUBQ TISS EACH ADDL: CPT

## 2024-07-26 PROCEDURE — 87186 SC STD MICRODIL/AGAR DIL: CPT | Mod: WESLAB | Performed by: SURGERY

## 2024-07-28 LAB
BACTERIA SPEC CULT: ABNORMAL
GRAM STN SPEC: ABNORMAL
GRAM STN SPEC: ABNORMAL

## 2024-07-29 LAB
BACTERIA SPEC CULT: ABNORMAL
BACTERIA SPEC CULT: ABNORMAL
GRAM STN SPEC: ABNORMAL
GRAM STN SPEC: ABNORMAL

## 2024-08-02 ENCOUNTER — OFFICE VISIT (OUTPATIENT)
Dept: WOUND CARE | Facility: HOSPITAL | Age: 54
End: 2024-08-02
Payer: COMMERCIAL

## 2024-08-02 PROCEDURE — 11042 DBRDMT SUBQ TIS 1ST 20SQCM/<: CPT

## 2024-08-07 ENCOUNTER — APPOINTMENT (OUTPATIENT)
Dept: PRIMARY CARE | Facility: CLINIC | Age: 54
End: 2024-08-07
Payer: COMMERCIAL

## 2024-08-09 ENCOUNTER — OFFICE VISIT (OUTPATIENT)
Dept: WOUND CARE | Facility: HOSPITAL | Age: 54
End: 2024-08-09
Payer: COMMERCIAL

## 2024-08-09 PROCEDURE — 97597 DBRDMT OPN WND 1ST 20 CM/<: CPT

## 2024-08-09 NOTE — PROGRESS NOTES
[facility]  Hand and Upper Extremity Service  Follow up visit         Follow up for: Left arm    Interval History: She returns for her left arm in which she had a dog bite injury to left forearm in early June which resulted in extensive damage to her dorsal forearm musculature and terminal branches of posterior interosseous nerve. She's completed all her therapy and her wound is well healed. She had a staff infection to the wound on her left lower extremity but has responded well to oral antibiotics. She is now ready to discuss tendon transfer surgery.               Past medical history, medications, allergies, surgical history and review of systems are reviewed and otherwise unchanged when compared to last visit on 7/8/24.         Examination:  Constitutional: Oriented to person, place, and time.  Appears well-developed and well-nourished.  Head: Normocephalic and atraumatic.  Eyes: Pupils are equal, round, and reactive to light.  Cardiovascular: Intact distal pulses.  Pulmonary/Chest/Breast: Effort normal. No respiratory distress.  Neurological: Alert and oriented to person, place, and time.  Skin: Skin is warm and dry.  Psychiatric: normal mood and affect.  Behavior is normal.  Musculoskeletal: Left arm reveals healed volar and dorsal forearm traumatic wounds without signs of infection. Strong wrist flexion and finger and thumb flexion. Able to demonstrate strong wrist extension with associated radial deviation but no active finger and thumb extension.       Personal Interpretation of Diagnostic studies: No new images obtained       Impression: Sequela of left forearm extensor muscle injury       Plan: We're going to schedule her now for tendon transfers to restore thumb and finger extension. I don't believe she has a palmaris longus tendon for us to use for her EPL but this may be something hard to determine due to her soft tissue envelope. If she doesn't have a palmaris longus  tendon we'd have to use her ring finger FDS tendon to give her EPL function and we'll transfer her FCR to her finger extensors through her interosseous membrane. We've discussed this surgical protocol and postoperative course and the importance of postoperative therapy. She'll contact my office so we can schedule these procedures for her likely sometime in October.       Follow up: Will call to schedule surgery     For Surgical Planning:  Diagnosis: Left forearm extensor muscle injury  Procedure: Tendon transfers to restore finger and thumb extension left forearm  CPT: 49944  Anesthesia: General  Duration: 75 minutes  Special Equipment Needed: Tendon braiding forceps  Medical Notes / PM / DM / PAT needed?:  PAT requested  Location: Any  Initial Post Operative Visit: 2 weeks, [facility] slot             Talon Gregorio MD  [facility]  Department of Orthopaedic Surgery  Hand and Upper Extremity Reconstruction    Scribe Attestation  By signing my name below, ISepideh , Scribgrayson   attest that this documentation has been prepared under the direction and in the presence of Dr. Talon Gregorio.    Dictation performed with the use of voice recognition software.  Syntax and grammatical errors may exist.

## 2024-08-12 ENCOUNTER — APPOINTMENT (OUTPATIENT)
Dept: ORTHOPEDIC SURGERY | Facility: CLINIC | Age: 54
End: 2024-08-12
Payer: COMMERCIAL

## 2024-08-12 VITALS — HEIGHT: 64 IN | WEIGHT: 293 LBS | BODY MASS INDEX: 50.02 KG/M2

## 2024-08-12 DIAGNOSIS — S51.802A OPEN WOUND OF LEFT FOREARM WITH TENDON INVOLVEMENT, INITIAL ENCOUNTER: Primary | ICD-10-CM

## 2024-08-12 DIAGNOSIS — S56.902A OPEN WOUND OF LEFT FOREARM WITH TENDON INVOLVEMENT, INITIAL ENCOUNTER: Primary | ICD-10-CM

## 2024-08-12 PROCEDURE — 3051F HG A1C>EQUAL 7.0%<8.0%: CPT | Performed by: ORTHOPAEDIC SURGERY

## 2024-08-12 PROCEDURE — 4010F ACE/ARB THERAPY RXD/TAKEN: CPT | Performed by: ORTHOPAEDIC SURGERY

## 2024-08-12 PROCEDURE — 1036F TOBACCO NON-USER: CPT | Performed by: ORTHOPAEDIC SURGERY

## 2024-08-12 PROCEDURE — 99024 POSTOP FOLLOW-UP VISIT: CPT | Performed by: ORTHOPAEDIC SURGERY

## 2024-08-12 PROCEDURE — 3008F BODY MASS INDEX DOCD: CPT | Performed by: ORTHOPAEDIC SURGERY

## 2024-08-12 ASSESSMENT — PAIN DESCRIPTION - DESCRIPTORS: DESCRIPTORS: ACHING;SORE

## 2024-08-12 ASSESSMENT — PAIN - FUNCTIONAL ASSESSMENT: PAIN_FUNCTIONAL_ASSESSMENT: 0-10

## 2024-08-12 ASSESSMENT — PAIN SCALES - GENERAL: PAINLEVEL_OUTOF10: 5 - MODERATE PAIN

## 2024-08-12 NOTE — LETTER
August 12, 2024     Rachel Savage, APRN-CNP  [address]  [facility] [address]  [facility] [address]    Patient: Vilma Grover   YOB: 1970   Date of Visit: 8/12/2024       Dear Dr. Rachel Savage, APRN-CNP:    Thank you for referring Vilma Grover to me for evaluation. Below are my notes for this consultation.  If you have questions, please do not hesitate to call me. I look forward to following your patient along with you.       Sincerely,     Talon Gregorio MD      CC: No Recipients  ______________________________________________________________________________________    [facility]  Hand and Upper Extremity Service  Follow up visit         Follow up for: Left arm    Interval History: She returns for her left arm in which she had a dog bite injury to left forearm in early June which resulted in extensive damage to her dorsal forearm musculature and terminal branches of posterior interosseous nerve. She's completed all her therapy and her wound is well healed. She had a staff infection to the wound on her left lower extremity but has responded well to oral antibiotics. She is now ready to discuss tendon transfer surgery.               Past medical history, medications, allergies, surgical history and review of systems are reviewed and otherwise unchanged when compared to last visit on 7/8/24.         Examination:  Constitutional: Oriented to person, place, and time.  Appears well-developed and well-nourished.  Head: Normocephalic and atraumatic.  Eyes: Pupils are equal, round, and reactive to light.  Cardiovascular: Intact distal pulses.  Pulmonary/Chest/Breast: Effort normal. No respiratory distress.  Neurological: Alert and oriented to person, place, and time.  Skin: Skin is warm and dry.  Psychiatric: normal mood and affect.  Behavior is normal.  Musculoskeletal: Left arm reveals healed volar and dorsal forearm traumatic wounds  without signs of infection. Strong wrist flexion and finger and thumb flexion. Able to demonstrate strong wrist extension with associated radial deviation but no active finger and thumb extension.       Personal Interpretation of Diagnostic studies: No new images obtained       Impression: Sequela of left forearm extensor muscle injury       Plan: We're going to schedule her now for tendon transfers to restore thumb and finger extension. I don't believe she has a palmaris longus tendon for us to use for her EPL but this may be something hard to determine due to her soft tissue envelope. If she doesn't have a palmaris longus tendon we'd have to use her ring finger FDS tendon to give her EPL function and we'll transfer her FCR to her finger extensors through her interosseous membrane. We've discussed this surgical protocol and postoperative course and the importance of postoperative therapy. She'll contact my office so we can schedule these procedures for her likely sometime in October.       Follow up: Will call to schedule surgery     For Surgical Planning:  Diagnosis: Left forearm extensor muscle injury  Procedure: Tendon transfers to restore finger and thumb extension left forearm  CPT: 38317  Anesthesia: General  Duration: 75 minutes  Special Equipment Needed: Tendon braiding forceps  Medical Notes / PM / DM / PAT needed?:  PAT requested  Location: Any  Initial Post Operative Visit: 2 weeks, [facility] slot             Talon Gregorio MD  [facility]  Department of Orthopaedic Surgery  Hand and Upper Extremity Reconstruction    Scribe Attestation  By signing my name below, ISepideh , Scribgrayson   attest that this documentation has been prepared under the direction and in the presence of Dr. Talon Gregorio.    Dictation performed with the use of voice recognition software.  Syntax and grammatical errors may exist.

## 2024-08-22 ENCOUNTER — PATIENT MESSAGE (OUTPATIENT)
Dept: PRIMARY CARE | Facility: CLINIC | Age: 54
End: 2024-08-22
Payer: COMMERCIAL

## 2024-08-23 ASSESSMENT — PROMIS GLOBAL HEALTH SCALE
EMOTIONAL_PROBLEMS: SOMETIMES
CARRYOUT_SOCIAL_ACTIVITIES: GOOD
RATE_AVERAGE_FATIGUE: MILD
RATE_GENERAL_HEALTH: GOOD
RATE_SOCIAL_SATISFACTION: GOOD
CARRYOUT_PHYSICAL_ACTIVITIES: MOSTLY
RATE_PHYSICAL_HEALTH: FAIR
RATE_MENTAL_HEALTH: GOOD
RATE_AVERAGE_PAIN: 5
RATE_QUALITY_OF_LIFE: GOOD

## 2024-08-27 ENCOUNTER — OFFICE VISIT (OUTPATIENT)
Dept: PRIMARY CARE | Facility: CLINIC | Age: 54
End: 2024-08-27
Payer: COMMERCIAL

## 2024-08-27 VITALS
OXYGEN SATURATION: 98 % | RESPIRATION RATE: 16 BRPM | BODY MASS INDEX: 50.02 KG/M2 | WEIGHT: 293 LBS | HEART RATE: 69 BPM | HEIGHT: 64 IN | SYSTOLIC BLOOD PRESSURE: 138 MMHG | DIASTOLIC BLOOD PRESSURE: 82 MMHG

## 2024-08-27 DIAGNOSIS — I10 BENIGN ESSENTIAL HYPERTENSION: Primary | ICD-10-CM

## 2024-08-27 DIAGNOSIS — E66.01 OBESITY, MORBID, BMI 50 OR HIGHER (MULTI): ICD-10-CM

## 2024-08-27 DIAGNOSIS — Z00.00 ENCOUNTER FOR ANNUAL PHYSICAL EXAM: ICD-10-CM

## 2024-08-27 DIAGNOSIS — F33.0 MILD EPISODE OF RECURRENT MAJOR DEPRESSIVE DISORDER (CMS-HCC): ICD-10-CM

## 2024-08-27 DIAGNOSIS — Z13.21 ENCOUNTER FOR VITAMIN DEFICIENCY SCREENING: ICD-10-CM

## 2024-08-27 DIAGNOSIS — E11.9 TYPE 2 DIABETES MELLITUS WITHOUT COMPLICATION, WITHOUT LONG-TERM CURRENT USE OF INSULIN (MULTI): ICD-10-CM

## 2024-08-27 DIAGNOSIS — Z13.29 SCREENING FOR THYROID DISORDER: ICD-10-CM

## 2024-08-27 DIAGNOSIS — E78.2 MIXED HYPERLIPIDEMIA: ICD-10-CM

## 2024-08-27 DIAGNOSIS — F41.1 GENERALIZED ANXIETY DISORDER: ICD-10-CM

## 2024-08-27 PROCEDURE — 1036F TOBACCO NON-USER: CPT

## 2024-08-27 PROCEDURE — 99396 PREV VISIT EST AGE 40-64: CPT

## 2024-08-27 PROCEDURE — 3051F HG A1C>EQUAL 7.0%<8.0%: CPT

## 2024-08-27 PROCEDURE — 3008F BODY MASS INDEX DOCD: CPT

## 2024-08-27 PROCEDURE — 3075F SYST BP GE 130 - 139MM HG: CPT

## 2024-08-27 PROCEDURE — 3079F DIAST BP 80-89 MM HG: CPT

## 2024-08-27 RX ORDER — LANCETS
EACH MISCELLANEOUS
Qty: 100 EACH | Refills: 1 | Status: SHIPPED | OUTPATIENT
Start: 2024-08-27

## 2024-08-27 RX ORDER — BLOOD SUGAR DIAGNOSTIC
1 STRIP MISCELLANEOUS DAILY
Qty: 100 EACH | Refills: 1 | Status: SHIPPED | OUTPATIENT
Start: 2024-08-27

## 2024-08-27 ASSESSMENT — PAIN SCALES - GENERAL: PAINLEVEL: 8

## 2024-08-27 NOTE — PATIENT INSTRUCTIONS
LAB Order/ BLOOD TESTS   I have ordered lab work for you to get done. This should be fasting. Nothing to eat or drink after midnight besides black tea,  black coffee, or water. If you do not hear from this office within two days of having your labs done, please call for your results.     HYPERLIPIDEMIA:  Decrease intake of saturated fats, fast food, sweets.  Increase intake of fresh fruit fresh vegetables and lean meats.  Increase healthy fats seeds, nuts, olive oil instead of butter.  walk 150 minutes/week for heart health.    HYPERTENSION:   Decrease intake of processed foods, fast foods, lunch meat, canned soups, canned veggies.  Increase intake of fresh fruits, veggies, and lean meats. Monitor blood pressure at home, keep a log and bring this with you to your next appointment. Call the office if your blood pressure runs 150/90 or higher consistently.    Blood Pressure Technique:  Sit quietly in a chair for 5 minutes with back supported and feet on the floor  Then place left arm on a table or armrest so bicep area is at the same level of heart or left breast  Check three times in a row, disregard the highest reading and average the other two    Scar tissue-   Begin Mederma Scar Treatment OTC for healed dog bite wounds. You can but at the local pharmacy or on [employer]
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
## 2024-08-28 ENCOUNTER — PATIENT OUTREACH (OUTPATIENT)
Dept: PRIMARY CARE | Facility: CLINIC | Age: 54
End: 2024-08-28

## 2024-08-28 ENCOUNTER — TELEPHONE (OUTPATIENT)
Dept: SLEEP MEDICINE | Facility: HOSPITAL | Age: 54
End: 2024-08-28

## 2024-08-28 ENCOUNTER — LAB (OUTPATIENT)
Dept: LAB | Facility: LAB | Age: 54
End: 2024-08-28
Payer: COMMERCIAL

## 2024-08-28 DIAGNOSIS — E83.10 DISORDER OF IRON METABOLISM: ICD-10-CM

## 2024-08-28 DIAGNOSIS — Z00.00 ENCOUNTER FOR ANNUAL PHYSICAL EXAM: ICD-10-CM

## 2024-08-28 DIAGNOSIS — E11.9 TYPE 2 DIABETES MELLITUS WITHOUT COMPLICATION, WITHOUT LONG-TERM CURRENT USE OF INSULIN (MULTI): ICD-10-CM

## 2024-08-28 DIAGNOSIS — E78.2 MIXED HYPERLIPIDEMIA: ICD-10-CM

## 2024-08-28 DIAGNOSIS — Z13.21 ENCOUNTER FOR VITAMIN DEFICIENCY SCREENING: ICD-10-CM

## 2024-08-28 DIAGNOSIS — Z13.29 SCREENING FOR THYROID DISORDER: ICD-10-CM

## 2024-08-28 LAB
25(OH)D3 SERPL-MCNC: 32 NG/ML (ref 31–100)
ALBUMIN SERPL-MCNC: 3.8 G/DL (ref 3.5–5)
ALP BLD-CCNC: 110 U/L (ref 35–125)
ALT SERPL-CCNC: 25 U/L (ref 5–40)
ANION GAP SERPL CALC-SCNC: 11 MMOL/L
AST SERPL-CCNC: 17 U/L (ref 5–40)
BASOPHILS # BLD AUTO: 0.05 X10*3/UL (ref 0–0.1)
BASOPHILS NFR BLD AUTO: 0.8 %
BILIRUB SERPL-MCNC: 0.2 MG/DL (ref 0.1–1.2)
BUN SERPL-MCNC: 10 MG/DL (ref 8–25)
CALCIUM SERPL-MCNC: 9.1 MG/DL (ref 8.5–10.4)
CHLORIDE SERPL-SCNC: 103 MMOL/L (ref 97–107)
CHOLEST SERPL-MCNC: 156 MG/DL (ref 133–200)
CHOLEST/HDLC SERPL: 3.3 {RATIO}
CO2 SERPL-SCNC: 27 MMOL/L (ref 24–31)
CREAT SERPL-MCNC: 0.8 MG/DL (ref 0.4–1.6)
CREAT UR-MCNC: 163 MG/DL
EGFRCR SERPLBLD CKD-EPI 2021: 87 ML/MIN/1.73M*2
EOSINOPHIL # BLD AUTO: 0.15 X10*3/UL (ref 0–0.7)
EOSINOPHIL NFR BLD AUTO: 2.5 %
ERYTHROCYTE [DISTWIDTH] IN BLOOD BY AUTOMATED COUNT: 14.4 % (ref 11.5–14.5)
EST. AVERAGE GLUCOSE BLD GHB EST-MCNC: 151 MG/DL
FERRITIN SERPL-MCNC: 30 NG/ML (ref 13–150)
GLUCOSE SERPL-MCNC: 136 MG/DL (ref 65–99)
HBA1C MFR BLD: 6.9 %
HCT VFR BLD AUTO: 37.9 % (ref 36–46)
HDLC SERPL-MCNC: 48 MG/DL
HGB BLD-MCNC: 11.7 G/DL (ref 12–16)
IMM GRANULOCYTES # BLD AUTO: 0.03 X10*3/UL (ref 0–0.7)
IMM GRANULOCYTES NFR BLD AUTO: 0.5 % (ref 0–0.9)
IRON SATN MFR SERPL: 8 % (ref 12–50)
IRON SERPL-MCNC: 27 UG/DL (ref 30–160)
LDLC SERPL CALC-MCNC: 88 MG/DL (ref 65–130)
LYMPHOCYTES # BLD AUTO: 1.94 X10*3/UL (ref 1.2–4.8)
LYMPHOCYTES NFR BLD AUTO: 32.8 %
MCH RBC QN AUTO: 25.2 PG (ref 26–34)
MCHC RBC AUTO-ENTMCNC: 30.9 G/DL (ref 32–36)
MCV RBC AUTO: 82 FL (ref 80–100)
MICROALBUMIN UR-MCNC: 29 MG/L (ref 0–23)
MICROALBUMIN/CREAT UR: 17.8 UG/MG CREAT
MONOCYTES # BLD AUTO: 0.31 X10*3/UL (ref 0.1–1)
MONOCYTES NFR BLD AUTO: 5.2 %
NEUTROPHILS # BLD AUTO: 3.43 X10*3/UL (ref 1.2–7.7)
NEUTROPHILS NFR BLD AUTO: 58.2 %
NRBC BLD-RTO: 0 /100 WBCS (ref 0–0)
PLATELET # BLD AUTO: 243 X10*3/UL (ref 150–450)
POTASSIUM SERPL-SCNC: 4.4 MMOL/L (ref 3.4–5.1)
PROT SERPL-MCNC: 6.2 G/DL (ref 5.9–7.9)
RBC # BLD AUTO: 4.64 X10*6/UL (ref 4–5.2)
SODIUM SERPL-SCNC: 141 MMOL/L (ref 133–145)
TIBC SERPL-MCNC: 348 UG/DL (ref 228–428)
TRIGL SERPL-MCNC: 101 MG/DL (ref 40–150)
TSH SERPL DL<=0.05 MIU/L-ACNC: 2.47 MIU/L (ref 0.27–4.2)
UIBC SERPL-MCNC: 321 UG/DL (ref 110–370)
WBC # BLD AUTO: 5.9 X10*3/UL (ref 4.4–11.3)

## 2024-08-28 PROCEDURE — 83036 HEMOGLOBIN GLYCOSYLATED A1C: CPT

## 2024-08-28 PROCEDURE — 85025 COMPLETE CBC W/AUTO DIFF WBC: CPT

## 2024-08-28 PROCEDURE — 83540 ASSAY OF IRON: CPT

## 2024-08-28 PROCEDURE — 80053 COMPREHEN METABOLIC PANEL: CPT

## 2024-08-28 PROCEDURE — 82570 ASSAY OF URINE CREATININE: CPT

## 2024-08-28 PROCEDURE — 80061 LIPID PANEL: CPT

## 2024-08-28 PROCEDURE — 84443 ASSAY THYROID STIM HORMONE: CPT

## 2024-08-28 PROCEDURE — 82306 VITAMIN D 25 HYDROXY: CPT

## 2024-08-28 PROCEDURE — 83550 IRON BINDING TEST: CPT

## 2024-08-28 PROCEDURE — 82043 UR ALBUMIN QUANTITATIVE: CPT

## 2024-08-28 PROCEDURE — 82728 ASSAY OF FERRITIN: CPT

## 2024-08-28 PROCEDURE — 36415 COLL VENOUS BLD VENIPUNCTURE: CPT

## 2024-08-28 NOTE — LETTER
August 28, 2024     Vilma Grover  [address] [facility] [address]      Dear Ms. Grover:    Below are the results from your recent visit:    Resulted Orders   TSH with reflex to Free T4 if abnormal   Result Value Ref Range    Thyroid Stimulating Hormone 2.47 0.27 - 4.20 mIU/L    Narrative    TSH testing is performed using different testing methodology at [facility] than at other [facility] hospitals. Direct result comparisons should only be made within the same method.     Comprehensive Metabolic Panel   Result Value Ref Range    Glucose 136 (H) 65 - 99 mg/dL    Sodium 141 133 - 145 mmol/L    Potassium 4.4 3.4 - 5.1 mmol/L    Chloride 103 97 - 107 mmol/L    Bicarbonate 27 24 - 31 mmol/L    Urea Nitrogen 10 8 - 25 mg/dL    Creatinine 0.80 0.40 - 1.60 mg/dL    eGFR 87 >60 mL/min/1.73m*2      Comment:      Calculations of estimated GFR are performed using the 2021 CKD-EPI Study Refit equation without the race variable for the IDMS-Traceable creatinine methods.  https://jasn.asnjournals.org/content/early/2021/09/22/ASN.0909634751    Calcium 9.1 8.5 - 10.4 mg/dL    Albumin 3.8 3.5 - 5.0 g/dL    Alkaline Phosphatase 110 35 - 125 U/L    Total Protein 6.2 5.9 - 7.9 g/dL    AST 17 5 - 40 U/L    Bilirubin, Total 0.2 0.1 - 1.2 mg/dL    ALT 25 5 - 40 U/L    Anion Gap 11 <=19 mmol/L   CBC and Auto Differential   Result Value Ref Range    WBC 5.9 4.4 - 11.3 x10*3/uL    nRBC 0.0 0.0 - 0.0 /100 WBCs    RBC 4.64 4.00 - 5.20 x10*6/uL    Hemoglobin 11.7 (L) 12.0 - 16.0 g/dL    Hematocrit 37.9 36.0 - 46.0 %    MCV 82 80 - 100 fL    MCH 25.2 (L) 26.0 - 34.0 pg    MCHC 30.9 (L) 32.0 - 36.0 g/dL    RDW 14.4 11.5 - 14.5 %    Platelets 243 150 - 450 x10*3/uL    Neutrophils % 58.2 40.0 - 80.0 %    Immature Granulocytes %, Automated 0.5 0.0 - 0.9 %      Comment:      Immature Granulocyte Count (IG) includes promyelocytes, myelocytes and metamyelocytes but does not include bands. Percent differential counts (%) should be  "interpreted in the context of the absolute cell counts (cells/UL).    Lymphocytes % 32.8 13.0 - 44.0 %    Monocytes % 5.2 2.0 - 10.0 %    Eosinophils % 2.5 0.0 - 6.0 %    Basophils % 0.8 0.0 - 2.0 %    Neutrophils Absolute 3.43 1.20 - 7.70 x10*3/uL      Comment:      Percent differential counts (%) should be interpreted in the context of the absolute cell counts (cells/uL).    Immature Granulocytes Absolute, Automated 0.03 0.00 - 0.70 x10*3/uL    Lymphocytes Absolute 1.94 1.20 - 4.80 x10*3/uL    Monocytes Absolute 0.31 0.10 - 1.00 x10*3/uL    Eosinophils Absolute 0.15 0.00 - 0.70 x10*3/uL    Basophils Absolute 0.05 0.00 - 0.10 x10*3/uL   Vitamin D 25-Hydroxy,Total (for eval of Vitamin D levels)   Result Value Ref Range    Vitamin D, 25-Hydroxy, Total 32 31 - 100 ng/mL   Lipid Panel   Result Value Ref Range    Cholesterol 156 133 - 200 mg/dL    HDL-Cholesterol 48.0 (L) >50.0 mg/dL      Comment:      National Cholesterol Education Program (NCFP) guidelines:  <40 mg/dL: Low HDL-cholesterol (major risk factor for CHD)  >60 mg/dL: High HDL-cholesterol (\"negative\"risk factor for CHD)  HDL-cholesterol is affected by a number of factors (e.g., smoking, exercise, hormones, sex and age).      Cholesterol/HDL Ratio 3.3 SEE COMMENT      Comment:      According to the American Heart Association, the goal is to maintain the total Cholesterol/HDL ratio at 5-to-1, or lower, with an optimum ratio of 3.5-to-1.    LDL Calculated 88 65 - 130 mg/dL    Triglycerides 101 40 - 150 mg/dL   Hemoglobin A1C   Result Value Ref Range    Hemoglobin A1C 6.9 (H) See below %    Estimated Average Glucose 151 Not Established mg/dL    Narrative    Diagnosis of Diabetes-Adults  Non-Diabetic: < or = 5.6%  Increased risk for developing diabetes: 5.7-6.4%  Diagnostic of diabetes: > or = 6.5%       Albumin-Creatinine Ratio, Urine Random   Result Value Ref Range    Albumin, Urine Random 29.0 (H) 0.0 - 23.0 mg/L    Creatinine, Urine Random 163.0 NOT " ESTABLISHED mg/dL    Albumin/Creatinine Ratio 17.8 ug/mg Creat     The test results show that your A1C 6.9, fasting glucose elevated. You are taking Metformin 1000mg daily. I would like to increase to 1000 mg in the  mg with dinner. Follow up in 3 months in office for repeat A1C. Please call and schedule an appointment. Anemia is stable. No elevation of white blood cells   All other labs within normal limits.    If you have any questions or concerns, please don't hesitate to call.         Sincerely,        Rachel Savage, CNP

## 2024-08-28 NOTE — TELEPHONE ENCOUNTER
Patient agreeable to start PO Ferrous Sulfate Therapy.      Discussed that Iron is best taken with 8 ounces of water or fruit juice, about 1 hour before or 2 hours after meals.     Avoid having dairy products (milk), tea, coffee or antacids within 2 hours before or after this medication because they decrease its effectiveness.      Discussed side effects to watch for such as GI upset, constipation, dark stools and to notify physician if unable to tolerate. Patient verbalized understanding, all questions answered, she will  pills OTC.

## 2024-08-28 NOTE — PROGRESS NOTES
Final call.  VALENTINE called and spoke with pt to address any final questions or concerns regarding hospitalization.  Pt reports doing well and has no concerns at this time.  She is aware of instructions with medications received from 's office regarding results from recent bloodwork. Pt given my contact info  in the event questions should arise

## 2024-10-04 DIAGNOSIS — S56.902A: Primary | ICD-10-CM

## 2024-10-04 PROBLEM — S56.909A: Status: ACTIVE | Noted: 2024-10-04

## 2024-10-09 ENCOUNTER — HOSPITAL ENCOUNTER (OUTPATIENT)
Dept: RADIOLOGY | Facility: HOSPITAL | Age: 54
Discharge: HOME | End: 2024-10-09
Payer: COMMERCIAL

## 2024-10-09 DIAGNOSIS — Z12.31 SCREENING MAMMOGRAM FOR BREAST CANCER: ICD-10-CM

## 2024-10-09 PROCEDURE — 77067 SCR MAMMO BI INCL CAD: CPT

## 2024-10-12 DIAGNOSIS — F41.1 GENERALIZED ANXIETY DISORDER: ICD-10-CM

## 2024-10-14 ENCOUNTER — PRE-ADMISSION TESTING (OUTPATIENT)
Dept: PREADMISSION TESTING | Facility: HOSPITAL | Age: 54
End: 2024-10-14
Payer: COMMERCIAL

## 2024-10-14 VITALS
RESPIRATION RATE: 18 BRPM | WEIGHT: 293 LBS | OXYGEN SATURATION: 97 % | TEMPERATURE: 97.3 F | DIASTOLIC BLOOD PRESSURE: 83 MMHG | HEART RATE: 79 BPM | BODY MASS INDEX: 50.02 KG/M2 | HEIGHT: 64 IN | SYSTOLIC BLOOD PRESSURE: 133 MMHG

## 2024-10-14 DIAGNOSIS — S56.902A: ICD-10-CM

## 2024-10-14 PROCEDURE — 99204 OFFICE O/P NEW MOD 45 MIN: CPT | Performed by: NURSE PRACTITIONER

## 2024-10-14 RX ORDER — FERROUS SULFATE 325(65) MG
65 TABLET, DELAYED RELEASE (ENTERIC COATED) ORAL
COMMUNITY

## 2024-10-14 RX ORDER — ACETAMINOPHEN 500 MG
1000 TABLET ORAL EVERY 6 HOURS PRN
COMMUNITY

## 2024-10-14 RX ORDER — CHOLECALCIFEROL (VITAMIN D3) 50 MCG
50 TABLET ORAL NIGHTLY
COMMUNITY

## 2024-10-14 RX ORDER — CITALOPRAM 40 MG/1
40 TABLET, FILM COATED ORAL DAILY
Qty: 30 TABLET | Refills: 5 | Status: SHIPPED | OUTPATIENT
Start: 2024-10-14

## 2024-10-14 RX ORDER — CALCIUM CARB/MAG OX/ZINC GLUC 333-133-5
1 TABLET ORAL NIGHTLY
COMMUNITY

## 2024-10-14 ASSESSMENT — DUKE ACTIVITY SCORE INDEX (DASI)
CAN YOU PARTICIPATE IN STRENOUS SPORTS LIKE SWIMMING, SINGLES TENNIS, FOOTBALL, BASKETBALL, OR SKIING: NO
CAN YOU WALK A BLOCK OR TWO ON LEVEL GROUND: YES
CAN YOU DO MODERATE WORK AROUND THE HOUSE LIKE VACUUMING, SWEEPING FLOORS OR CARRYING GROCERIES: YES
CAN YOU PARTICIPATE IN MODERATE RECREATIONAL ACTIVITIES LIKE GOLF, BOWLING, DANCING, DOUBLES TENNIS OR THROWING A BASEBALL OR FOOTBALL: NO
CAN YOU HAVE SEXUAL RELATIONS: YES
CAN YOU DO HEAVY WORK AROUND THE HOUSE LIKE SCRUBBING FLOORS OR LIFTING AND MOVING HEAVY FURNITURE: YES
CAN YOU RUN A SHORT DISTANCE: NO
CAN YOU DO YARD WORK LIKE RAKING LEAVES, WEEDING OR PUSHING A MOWER: YES
CAN YOU TAKE CARE OF YOURSELF (EAT, DRESS, BATHE, OR USE TOILET): YES
DASI METS SCORE: 7.3
CAN YOU WALK INDOORS, SUCH AS AROUND YOUR HOUSE: YES
TOTAL_SCORE: 36.7
CAN YOU DO LIGHT WORK AROUND THE HOUSE LIKE DUSTING OR WASHING DISHES: YES
CAN YOU CLIMB A FLIGHT OF STAIRS OR WALK UP A HILL: YES

## 2024-10-14 ASSESSMENT — PAIN SCALES - GENERAL: PAINLEVEL_OUTOF10: 0 - NO PAIN

## 2024-10-14 ASSESSMENT — PAIN - FUNCTIONAL ASSESSMENT: PAIN_FUNCTIONAL_ASSESSMENT: 0-10

## 2024-10-14 ASSESSMENT — ENCOUNTER SYMPTOMS
RESPIRATORY NEGATIVE: 1
GASTROINTESTINAL NEGATIVE: 1
PSYCHIATRIC NEGATIVE: 1
NEUROLOGICAL NEGATIVE: 1
ACTIVITY CHANGE: 1
EYES NEGATIVE: 1

## 2024-10-14 NOTE — CPM/PAT H&P
CPM/PAT Evaluation       Name: Vilma Gorver (Vilma Grover)  /Age: 1970/54 y.o.     In-Person       Chief Complaint: Injury of left forearm muscle or tendon     HPI  A 54-year-old female with injury of left forearm muscle or tendon.  History of traumatic injuries from dog bite to left arm and left leg on 2024 transferred to [facility] status post surgical repair debridement and closure of multiple wounds.  She sustained extensive injury/damage to left forearm nerve branches. She received physical therapy, wounds are healed.  She has limited range of motion in left hand fingers and thumb-she is unable to fully extend fingers.  Endorses tightness in left forearm.  Denies fever, chills, chest pain, shortness of breath, syncope.  She is scheduled for tendon transfers to restore left finger and thumb extension left forearm.       Past Medical History:   Diagnosis Date    Abnormal Pap smear of cervix     Anxiety     Colon polyps     Depression     Diabetes (Multi)     High cholesterol     Hypertension     Obesity     Sleep apnea     Urinary tract infection     Varicella        Past Surgical History:   Procedure Laterality Date    APPENDECTOMY      BREAST BIOPSY Right     2018 right stereo bx benign through ccl     SECTION, CLASSIC       Section     SECTION, CLASSIC       SECTION, CLASSIC      baby passed away    CONDYLOMA EXCISION/FULGURATION      OTHER SURGICAL HISTORY  10/23/2014    Bunion Correction By [place] Procedure         Allergies   Allergen Reactions    Empagliflozin Other     Yeast    Erythromycin Base GI Upset     Upset stomach    Lisinopril Cough     Dry cough       Current Outpatient Medications   Medication Sig Dispense Refill    acetaminophen (Tylenol) 500 mg tablet Take 2 tablets (1,000 mg) by mouth every 6 hours if needed for mild pain (1 - 3).      aspirin ([place] Aspirin) 81 mg EC tablet Take 1 tablet (81 mg) by mouth once daily.       atorvastatin (Lipitor) 20 mg tablet Take 1 tablet (20 mg) by mouth once daily.      calcium carb-mag ox-zinc gluc 333-133-5 mg tablet Take 1 tablet by mouth once daily at bedtime.      cholecalciferol (Vitamin D3) 50 MCG (2000 UT) tablet Take 1 tablet (50 mcg) by mouth once daily at bedtime.      citalopram (CeleXA) 40 mg tablet TAKE ONE TABLET BY MOUTH EVERY DAY 30 tablet 5    cyanocobalamin (Vitamin B-12) 100 mcg tablet Take 5 tablets (500 mcg) by mouth once daily. For 30 days      dulaglutide 3 mg/0.5 mL pen injector Inject 3 mg under the skin 1 (one) time per week. 2 mL 11    ferrous sulfate 325 (65 Fe) MG EC tablet Take 65 mg by mouth 3 times daily (morning, midday, late afternoon). Do not crush, chew, or split.      metFORMIN  mg 24 hr tablet Take 2 tablets (1,000 mg) by mouth once daily in the evening. Take with meals. (Patient taking differently: Take 2 tablets (1,000 mg) by mouth once daily with breakfast.) 180 tablet 1    MULTIVITAMIN ORAL Take 1 tablet by mouth once daily. For 30 days      lancets misc Use to monitor blood glucose once daily 100 each 1    nystatin (Mycostatin) cream Apply 1 Application topically 2 times a day as needed (rash/irritation).      OneTouch Ultra Test strip 1 each once daily. 100 each 1    valsartan (Diovan) 320 mg tablet Take 1 tablet (320 mg) by mouth once daily. (Patient taking differently: Take 1 tablet (320 mg) by mouth 2 times a day.) 90 tablet 1     No current facility-administered medications for this visit.       Review of Systems   Constitutional:  Positive for activity change.   HENT: Negative.     Eyes: Negative.         Glasses   Respiratory: Negative.     Cardiovascular:  Positive for leg swelling (Chronic left lower extremity swelling since injury).   Gastrointestinal: Negative.    Genitourinary: Negative.    Musculoskeletal:         Left hand/fingers limited range of motion   Skin: Negative.    Neurological: Negative.    Psychiatric/Behavioral: Negative.    "       Physical Exam  Vitals reviewed.   Constitutional:       Appearance: She is obese.   HENT:      Head: Normocephalic and atraumatic.      Mouth/Throat:      Mouth: Mucous membranes are moist.   Eyes:      Pupils: Pupils are equal, round, and reactive to light.      Comments: Glasses   Cardiovascular:      Rate and Rhythm: Normal rate and regular rhythm.   Pulmonary:      Effort: Pulmonary effort is normal.      Breath sounds: Normal breath sounds.   Abdominal:      Palpations: Abdomen is soft.      Comments: Soft, obese   Musculoskeletal:      Cervical back: Normal range of motion.      Comments: Limited range of motion left hand fingers   Skin:     General: Skin is warm and dry.   Neurological:      Mental Status: She is alert and oriented to person, place, and time.   Psychiatric:         Mood and Affect: Mood normal.          PAT AIRWAY:   Airway:     Mallampati::  I    Neck ROM::  Full  normal          /83   Pulse 79   Temp 36.3 °C (97.3 °F) (Temporal)   Resp 18   Ht 1.626 m (5' 4\")   Wt 141 kg (311 lb 6.4 oz)   SpO2 97%   BMI 53.45 kg/m²       Stop Bang Score 6   CHADS: 4.0%  DASI risk score: 36.7  METS: 7.3  RCRI: 0.4%  ASA: II  ARISCAT:1.6%  EKG done 5/10/2024  Labs done 8/28/2024 CBC, CMP, hemoglobin A1c    Assessment and Plan:     Injury of left forearm muscle or tendon Plan: Tendon transfers to restore left finger and thumb extension left forearm.   Diabetes takes metformin, Trulicity- hemoglobin A1c 6.9 on 8/28/2024 last dose of Trulicity 10/11/2024  Hypertension managed with valsartan  Hyperlipidemia takes atorvastatin  Anxiety/depression managed with citalopram  Sleep apnea  BMI 53.45        "

## 2024-10-14 NOTE — H&P (VIEW-ONLY)
CPM/PAT Evaluation       Name: Vilma Grover (Vilma Grover)  /Age: 1970/54 y.o.     In-Person       Chief Complaint: Injury of left forearm muscle or tendon     HPI  A 54-year-old female with injury of left forearm muscle or tendon.  History of traumatic injuries from dog bite to left arm and left leg on 2024 transferred to [facility] status post surgical repair debridement and closure of multiple wounds.  She sustained extensive injury/damage to left forearm nerve branches. She received physical therapy, wounds are healed.  She has limited range of motion in left hand fingers and thumb-she is unable to fully extend fingers.  Endorses tightness in left forearm.  Denies fever, chills, chest pain, shortness of breath, syncope.  She is scheduled for tendon transfers to restore left finger and thumb extension left forearm.       Past Medical History:   Diagnosis Date    Abnormal Pap smear of cervix     Anxiety     Colon polyps     Depression     Diabetes (Multi)     High cholesterol     Hypertension     Obesity     Sleep apnea     Urinary tract infection     Varicella        Past Surgical History:   Procedure Laterality Date    APPENDECTOMY      BREAST BIOPSY Right     2018 right stereo bx benign through ccl     SECTION, CLASSIC       Section     SECTION, CLASSIC       SECTION, CLASSIC      baby passed away    CONDYLOMA EXCISION/FULGURATION      OTHER SURGICAL HISTORY  10/23/2014    Bunion Correction By [place] Procedure         Allergies   Allergen Reactions    Empagliflozin Other     Yeast    Erythromycin Base GI Upset     Upset stomach    Lisinopril Cough     Dry cough       Current Outpatient Medications   Medication Sig Dispense Refill    acetaminophen (Tylenol) 500 mg tablet Take 2 tablets (1,000 mg) by mouth every 6 hours if needed for mild pain (1 - 3).      aspirin ([place] Aspirin) 81 mg EC tablet Take 1 tablet (81 mg) by mouth once daily.       atorvastatin (Lipitor) 20 mg tablet Take 1 tablet (20 mg) by mouth once daily.      calcium carb-mag ox-zinc gluc 333-133-5 mg tablet Take 1 tablet by mouth once daily at bedtime.      cholecalciferol (Vitamin D3) 50 MCG (2000 UT) tablet Take 1 tablet (50 mcg) by mouth once daily at bedtime.      citalopram (CeleXA) 40 mg tablet TAKE ONE TABLET BY MOUTH EVERY DAY 30 tablet 5    cyanocobalamin (Vitamin B-12) 100 mcg tablet Take 5 tablets (500 mcg) by mouth once daily. For 30 days      dulaglutide 3 mg/0.5 mL pen injector Inject 3 mg under the skin 1 (one) time per week. 2 mL 11    ferrous sulfate 325 (65 Fe) MG EC tablet Take 65 mg by mouth 3 times daily (morning, midday, late afternoon). Do not crush, chew, or split.      metFORMIN  mg 24 hr tablet Take 2 tablets (1,000 mg) by mouth once daily in the evening. Take with meals. (Patient taking differently: Take 2 tablets (1,000 mg) by mouth once daily with breakfast.) 180 tablet 1    MULTIVITAMIN ORAL Take 1 tablet by mouth once daily. For 30 days      lancets misc Use to monitor blood glucose once daily 100 each 1    nystatin (Mycostatin) cream Apply 1 Application topically 2 times a day as needed (rash/irritation).      OneTouch Ultra Test strip 1 each once daily. 100 each 1    valsartan (Diovan) 320 mg tablet Take 1 tablet (320 mg) by mouth once daily. (Patient taking differently: Take 1 tablet (320 mg) by mouth 2 times a day.) 90 tablet 1     No current facility-administered medications for this visit.       Review of Systems   Constitutional:  Positive for activity change.   HENT: Negative.     Eyes: Negative.         Glasses   Respiratory: Negative.     Cardiovascular:  Positive for leg swelling (Chronic left lower extremity swelling since injury).   Gastrointestinal: Negative.    Genitourinary: Negative.    Musculoskeletal:         Left hand/fingers limited range of motion   Skin: Negative.    Neurological: Negative.    Psychiatric/Behavioral: Negative.    "       Physical Exam  Vitals reviewed.   Constitutional:       Appearance: She is obese.   HENT:      Head: Normocephalic and atraumatic.      Mouth/Throat:      Mouth: Mucous membranes are moist.   Eyes:      Pupils: Pupils are equal, round, and reactive to light.      Comments: Glasses   Cardiovascular:      Rate and Rhythm: Normal rate and regular rhythm.   Pulmonary:      Effort: Pulmonary effort is normal.      Breath sounds: Normal breath sounds.   Abdominal:      Palpations: Abdomen is soft.      Comments: Soft, obese   Musculoskeletal:      Cervical back: Normal range of motion.      Comments: Limited range of motion left hand fingers   Skin:     General: Skin is warm and dry.   Neurological:      Mental Status: She is alert and oriented to person, place, and time.   Psychiatric:         Mood and Affect: Mood normal.          PAT AIRWAY:   Airway:     Mallampati::  I    Neck ROM::  Full  normal          /83   Pulse 79   Temp 36.3 °C (97.3 °F) (Temporal)   Resp 18   Ht 1.626 m (5' 4\")   Wt 141 kg (311 lb 6.4 oz)   SpO2 97%   BMI 53.45 kg/m²       Stop Bang Score 6   CHADS: 4.0%  DASI risk score: 36.7  METS: 7.3  RCRI: 0.4%  ASA: II  ARISCAT:1.6%  EKG done 5/10/2024  Labs done 8/28/2024 CBC, CMP, hemoglobin A1c    Assessment and Plan:     Injury of left forearm muscle or tendon Plan: Tendon transfers to restore left finger and thumb extension left forearm.   Diabetes takes metformin, Trulicity- hemoglobin A1c 6.9 on 8/28/2024 last dose of Trulicity 10/11/2024  Hypertension managed with valsartan  Hyperlipidemia takes atorvastatin  Anxiety/depression managed with citalopram  Sleep apnea  BMI 53.45        "

## 2024-10-14 NOTE — PREPROCEDURE INSTRUCTIONS
Medication List            Accurate as of October 14, 2024  4:31 PM. Always use your most recent med list.                acetaminophen 500 mg tablet  Commonly known as: Tylenol  Medication Adjustments for Surgery: Take/Use as prescribed     atorvastatin 20 mg tablet  Commonly known as: Lipitor  Medication Adjustments for Surgery: Take/Use as prescribed     calcium carb-mag ox-zinc gluc 333-133-5 mg tablet  Additional Medication Adjustments for Surgery: Other (Comment)  Notes to patient: Stop until after surgery     citalopram 40 mg tablet  Commonly known as: CeleXA  TAKE ONE TABLET BY MOUTH EVERY DAY  Medication Adjustments for Surgery: Take on the morning of surgery     cyanocobalamin 100 mcg tablet  Commonly known as: Vitamin B-12  Additional Medication Adjustments for Surgery: Other (Comment)  Notes to patient: Stop until after surgery     dulaglutide 3 mg/0.5 mL pen injector  Inject 3 mg under the skin 1 (one) time per week.  Additional Medication Adjustments for Surgery: Take last dose 7 days before surgery  Notes to patient: Last dose was 10/11/24     ferrous sulfate 325 (65 Fe) MG EC tablet  Additional Medication Adjustments for Surgery: Take last dose 7 days before surgery        metFORMIN  mg 24 hr tablet  Commonly known as: Glucophage-XR  Take 2 tablets (1,000 mg) by mouth once daily in the evening. Take with meals.  Medication Adjustments for Surgery: Do Not take on the morning of surgery     MULTIVITAMIN ORAL  Additional Medication Adjustments for Surgery: Other (Comment)  Notes to patient: Stop until after surgery     nystatin cream  Commonly known as: Mycostatin  Medication Adjustments for Surgery: Take/Use as prescribed        [place] Aspirin 81 mg EC tablet  Generic drug: aspirin  Additional Medication Adjustments for Surgery: Other (Comment)  Notes to patient: Stop until after surgery     valsartan 320 mg tablet  Commonly known as: Diovan  Take 1 tablet (320 mg) by mouth once  daily.  Medication Adjustments for Surgery: Take last dose 1 day (24 hours) before surgery  Notes to patient: Hold evening dose the night before and morning dose day of surgery     Vitamin D3 50 MCG (2000 UT) tablet  Generic drug: cholecalciferol  Additional Medication Adjustments for Surgery: Take last dose 7 days before surgery          Preoperative Fasting Guidelines    Why must I stop eating and drinking near surgery time?  With sedation, food or liquid in your stomach can enter your lungs causing serious complications  Increases nausea and vomiting    When do I need to stop eating and drinking before my surgery?  Do not eat any food or drink any liquids after midnight the night before your surgery/procedure.  You may have sips of water to take medications.    PAT DISCHARGE INSTRUCTIONS    Please call the Same Day Surgery (SDS) Department of the hospital where your procedure will be performed after 2:00 PM the day before your surgery. If you are scheduled on a Monday, or a Tuesday following a Monday holiday, you will need to call on the last business day prior to your surgery.    [facility]  [address] [place] [address] [phone]  [facility]  [address] [place] [address], [id number] [phone]  [facility]  [id number] [place] [address].  [place] [address]  [phone]    Please let your surgeon know if:      You develop any open sores, shingles, burning or painful urination as these may increase your risk of an infection.   You no longer wish to have the surgery.   Any other personal circumstances change that may lead to the need to cancel or defer this surgery-such as being sick or getting admitted to any hospital within one week of your planned procedure.    Your contact details change, such as a change of address or phone number.    Starting now:     Please DO NOT  drink alcohol or smoke for 24 hours before surgery. It is well known that quitting smoking can make a huge difference to your health and recovery from surgery. The longer you abstain from smoking, the better your chances of a healthy recovery. If you need help with quitting, call 5-800QUIT-NOW to be connected to a trained counselor who will discuss the best methods to help you quit.     Before your surgery:    Please stop all supplements 7 days prior to surgery. Or as directed by your surgeon.   Please stop taking NSAID pain medicine such as Advil and Motrin 7 days before surgery.    If you develop any fever, cough, cold, rashes, cuts, scratches, scrapes, urinary symptoms or infection anywhere on your body (including teeth and gums) prior to surgery, please call your surgeon’s office as soon as possible. This may require treatment to reduce the chance of cancellation on the day of surgery.    The day before your surgery:   DIET- Please follow the diet instructions at the top of your packet.   Get a good night’s rest.  Use the special soap for bathing if you have been instructed to use one.    Scheduled surgery times may change and you will be notified if this occurs - please check your personal voicemail for any updates.     On the morning of surgery:   Wear comfortable, loose fitting clothes which open in the front. Please do not wear moisturizers, creams, lotions, makeup or perfume.    Please bring with you to surgery:   Photo ID and insurance card   Current list of medicines and allergies   Pacemaker/ Defibrillator/Heart stent cards   CPAP machine and mask    Slings/ splints/ crutches   A copy of your complete advanced directive/DHPOA.    Please do NOT bring with you to surgery:   All jewelry and valuables should be left at home.   Prosthetic devices such as contact lenses, hearing aids, dentures, eyelash extensions, hairpins and body piercings must be removed prior to going in to the surgical suite.    After  outpatient surgery:   A responsible adult MUST accompany you at the time of discharge and stay with you for 24 hours after your surgery. You may NOT drive yourself home after surgery.    Do not drive, operate machinery, make critical decisions or do activities that require co-ordination or balance until after a night’s sleep.   Do not drink alcoholic beverages for 24 hours.   Instructions for resuming your medications will be provided by your surgeon.    CALL YOUR DOCTOR AFTER SURGERY IF YOU HAVE:     Chills and/or a fever of 101° F or higher.    Redness, swelling, pus or drainage from your surgical wound or a bad smell from the wound.    Lightheadedness, fainting or confusion.    Persistent vomiting (throwing up) and are not able to eat or drink for 12 hours.    Three or more loose, watery bowel movements in 24 hours (diarrhea).   Difficulty or pain while urinating( after non-urological surgery)    Pain and swelling in your legs, especially if it is only on one side.    Difficulty breathing or are breathing faster than normal.    Any new concerning symptoms.     Home Preoperative Antibacterial Shower    What is a home preoperative antibacterial shower?  This shower is a way of cleaning the skin with a germ killing solution before surgery. The solution contains chlorhexidine, commonly known as CHG. CHG is a skin cleanser with germ killing ability. Let your doctor know if you are allergic to chlorhexidine.      Why do I need to take a preoperative antibacterial shower?  Skin is not sterile. It is best to try to make your skin as free of germs as possible before surgery. Proper cleansing with a germ killing soap before surgery can lower the number of germs on your skin. This helps to reduce the risk of infection at the surgical site. Following the instructions listed below will help you prepare your skin for surgery.    How do I use the solution?      Steps: Begin using your CHG soap the night before and the morning  of your scheduled surgery on ____10/16/24______.  First, wash and rinse your hair using the CHG soap. Keep CHG away soap away from ear canals and eyes.  Rinse completely, do not condition. Hair extensions should be removed.  Wash your face with your normal soap and rinse.  Apply the CHG solution to a clean wet washcloth. Turn the water off or move away from the water spray to avoid premature rinsing of the CHG soap as you are applying.  Firmly lather your entire body from neck down. Do not use on face.  Pay special attention to the areas(s) where your incision(s) will be located unless they are on your face.  Avoid scrubbing your skin too hard.  The important point is to have the CHG soap sit on your skin for 3 minutes.  DO NOT wash with regular soap after you have used the CHG soap solution.  Pat yourself dry with a clean, freshly laundered towel.  DO NOT apply powders, deodorants or lotions.  Dress in clean, freshly laundered night clothes.  Be sure to sleep with clean, freshly laundered sheets.  Be aware that CHG will cause stains on fabrics; if you wash them with bleach after use. Rinse your washcloth and other linens that have contact with CHG completely. Use only non-chlorine detergents to launder the items used.  The morning of surgery is the fifth day. Repeat the above steps and dress in clean comfortable clothing.      Who should I call if I have any questions regarding the use of CHG soap?  Call the [facility] at [phone] if you have any questions.

## 2024-10-15 ENCOUNTER — OFFICE VISIT (OUTPATIENT)
Dept: OBSTETRICS AND GYNECOLOGY | Facility: CLINIC | Age: 54
End: 2024-10-15
Payer: COMMERCIAL

## 2024-10-15 ENCOUNTER — PATIENT MESSAGE (OUTPATIENT)
Dept: PRIMARY CARE | Facility: CLINIC | Age: 54
End: 2024-10-15

## 2024-10-15 VITALS — BODY MASS INDEX: 53.38 KG/M2 | WEIGHT: 293 LBS | SYSTOLIC BLOOD PRESSURE: 124 MMHG | DIASTOLIC BLOOD PRESSURE: 88 MMHG

## 2024-10-15 DIAGNOSIS — N90.89 VULVAR LESION: Primary | ICD-10-CM

## 2024-10-15 PROCEDURE — 3061F NEG MICROALBUMINURIA REV: CPT | Performed by: OBSTETRICS & GYNECOLOGY

## 2024-10-15 PROCEDURE — 3044F HG A1C LEVEL LT 7.0%: CPT | Performed by: OBSTETRICS & GYNECOLOGY

## 2024-10-15 PROCEDURE — 99213 OFFICE O/P EST LOW 20 MIN: CPT | Performed by: OBSTETRICS & GYNECOLOGY

## 2024-10-15 PROCEDURE — 3079F DIAST BP 80-89 MM HG: CPT | Performed by: OBSTETRICS & GYNECOLOGY

## 2024-10-15 PROCEDURE — 3048F LDL-C <100 MG/DL: CPT | Performed by: OBSTETRICS & GYNECOLOGY

## 2024-10-15 PROCEDURE — 3074F SYST BP LT 130 MM HG: CPT | Performed by: OBSTETRICS & GYNECOLOGY

## 2024-10-15 ASSESSMENT — ENCOUNTER SYMPTOMS
ALLERGIC/IMMUNOLOGIC NEGATIVE: 0
CONSTITUTIONAL NEGATIVE: 0
RESPIRATORY NEGATIVE: 0
EYES NEGATIVE: 0
GASTROINTESTINAL NEGATIVE: 0
ENDOCRINE NEGATIVE: 0
HEMATOLOGIC/LYMPHATIC NEGATIVE: 0
NEUROLOGICAL NEGATIVE: 0
MUSCULOSKELETAL NEGATIVE: 0
PSYCHIATRIC NEGATIVE: 0
CARDIOVASCULAR NEGATIVE: 0

## 2024-10-16 ENCOUNTER — ANESTHESIA EVENT (OUTPATIENT)
Dept: OPERATING ROOM | Facility: HOSPITAL | Age: 54
End: 2024-10-16
Payer: COMMERCIAL

## 2024-10-16 ENCOUNTER — HOSPITAL ENCOUNTER (OUTPATIENT)
Facility: HOSPITAL | Age: 54
Setting detail: OUTPATIENT SURGERY
Discharge: HOME | End: 2024-10-16
Attending: ORTHOPAEDIC SURGERY | Admitting: ORTHOPAEDIC SURGERY
Payer: COMMERCIAL

## 2024-10-16 ENCOUNTER — ANESTHESIA (OUTPATIENT)
Dept: OPERATING ROOM | Facility: HOSPITAL | Age: 54
End: 2024-10-16
Payer: COMMERCIAL

## 2024-10-16 VITALS
BODY MASS INDEX: 50.02 KG/M2 | TEMPERATURE: 97 F | DIASTOLIC BLOOD PRESSURE: 82 MMHG | HEART RATE: 83 BPM | OXYGEN SATURATION: 96 % | SYSTOLIC BLOOD PRESSURE: 141 MMHG | RESPIRATION RATE: 14 BRPM | HEIGHT: 64 IN | WEIGHT: 293 LBS

## 2024-10-16 DIAGNOSIS — S56.902A: ICD-10-CM

## 2024-10-16 DIAGNOSIS — S56.901D: Primary | ICD-10-CM

## 2024-10-16 LAB — GLUCOSE BLD MANUAL STRIP-MCNC: 145 MG/DL (ref 74–99)

## 2024-10-16 PROCEDURE — 7100000002 HC RECOVERY ROOM TIME - EACH INCREMENTAL 1 MINUTE: Performed by: ORTHOPAEDIC SURGERY

## 2024-10-16 PROCEDURE — 3600000003 HC OR TIME - INITIAL BASE CHARGE - PROCEDURE LEVEL THREE: Performed by: ORTHOPAEDIC SURGERY

## 2024-10-16 PROCEDURE — 2500000004 HC RX 250 GENERAL PHARMACY W/ HCPCS (ALT 636 FOR OP/ED): Mod: JZ | Performed by: ORTHOPAEDIC SURGERY

## 2024-10-16 PROCEDURE — 7100000009 HC PHASE TWO TIME - INITIAL BASE CHARGE: Performed by: ORTHOPAEDIC SURGERY

## 2024-10-16 PROCEDURE — 7100000001 HC RECOVERY ROOM TIME - INITIAL BASE CHARGE: Performed by: ORTHOPAEDIC SURGERY

## 2024-10-16 PROCEDURE — 7100000010 HC PHASE TWO TIME - EACH INCREMENTAL 1 MINUTE: Performed by: ORTHOPAEDIC SURGERY

## 2024-10-16 PROCEDURE — 2500000004 HC RX 250 GENERAL PHARMACY W/ HCPCS (ALT 636 FOR OP/ED): Performed by: NURSE ANESTHETIST, CERTIFIED REGISTERED

## 2024-10-16 PROCEDURE — 82947 ASSAY GLUCOSE BLOOD QUANT: CPT

## 2024-10-16 PROCEDURE — 3600000008 HC OR TIME - EACH INCREMENTAL 1 MINUTE - PROCEDURE LEVEL THREE: Performed by: ORTHOPAEDIC SURGERY

## 2024-10-16 PROCEDURE — 25310 TRANSPLANT FOREARM TENDON: CPT | Performed by: ORTHOPAEDIC SURGERY

## 2024-10-16 PROCEDURE — 3700000002 HC GENERAL ANESTHESIA TIME - EACH INCREMENTAL 1 MINUTE: Performed by: ORTHOPAEDIC SURGERY

## 2024-10-16 PROCEDURE — 2500000004 HC RX 250 GENERAL PHARMACY W/ HCPCS (ALT 636 FOR OP/ED): Performed by: ORTHOPAEDIC SURGERY

## 2024-10-16 PROCEDURE — 3700000001 HC GENERAL ANESTHESIA TIME - INITIAL BASE CHARGE: Performed by: ORTHOPAEDIC SURGERY

## 2024-10-16 PROCEDURE — 2500000004 HC RX 250 GENERAL PHARMACY W/ HCPCS (ALT 636 FOR OP/ED): Performed by: STUDENT IN AN ORGANIZED HEALTH CARE EDUCATION/TRAINING PROGRAM

## 2024-10-16 RX ORDER — CEFAZOLIN SODIUM IN 0.9 % NACL 3 G/100 ML
3 INTRAVENOUS SOLUTION, PIGGYBACK (ML) INTRAVENOUS ONCE
Status: DISCONTINUED | OUTPATIENT
Start: 2024-10-16 | End: 2024-10-16 | Stop reason: HOSPADM

## 2024-10-16 RX ORDER — LIDOCAINE HYDROCHLORIDE 10 MG/ML
INJECTION, SOLUTION INFILTRATION; PERINEURAL AS NEEDED
Status: DISCONTINUED | OUTPATIENT
Start: 2024-10-16 | End: 2024-10-16 | Stop reason: HOSPADM

## 2024-10-16 RX ORDER — OXYCODONE HYDROCHLORIDE 5 MG/1
5 TABLET ORAL EVERY 6 HOURS PRN
Qty: 28 TABLET | Refills: 0 | Status: SHIPPED | OUTPATIENT
Start: 2024-10-16 | End: 2024-10-23

## 2024-10-16 RX ORDER — ROCURONIUM BROMIDE 10 MG/ML
INJECTION, SOLUTION INTRAVENOUS AS NEEDED
Status: DISCONTINUED | OUTPATIENT
Start: 2024-10-16 | End: 2024-10-16

## 2024-10-16 RX ORDER — SUCCINYLCHOLINE CHLORIDE 20 MG/ML
INJECTION INTRAMUSCULAR; INTRAVENOUS AS NEEDED
Status: DISCONTINUED | OUTPATIENT
Start: 2024-10-16 | End: 2024-10-16

## 2024-10-16 RX ORDER — KETOROLAC TROMETHAMINE 30 MG/ML
INJECTION, SOLUTION INTRAMUSCULAR; INTRAVENOUS AS NEEDED
Status: DISCONTINUED | OUTPATIENT
Start: 2024-10-16 | End: 2024-10-16

## 2024-10-16 RX ORDER — ONDANSETRON HYDROCHLORIDE 2 MG/ML
INJECTION, SOLUTION INTRAVENOUS AS NEEDED
Status: DISCONTINUED | OUTPATIENT
Start: 2024-10-16 | End: 2024-10-16

## 2024-10-16 RX ORDER — PROPOFOL 10 MG/ML
INJECTION, EMULSION INTRAVENOUS AS NEEDED
Status: DISCONTINUED | OUTPATIENT
Start: 2024-10-16 | End: 2024-10-16

## 2024-10-16 RX ORDER — SODIUM CHLORIDE, SODIUM LACTATE, POTASSIUM CHLORIDE, CALCIUM CHLORIDE 600; 310; 30; 20 MG/100ML; MG/100ML; MG/100ML; MG/100ML
50 INJECTION, SOLUTION INTRAVENOUS CONTINUOUS
Status: DISCONTINUED | OUTPATIENT
Start: 2024-10-16 | End: 2024-10-16 | Stop reason: HOSPADM

## 2024-10-16 RX ORDER — HYDROMORPHONE HYDROCHLORIDE 0.2 MG/ML
0.2 INJECTION INTRAMUSCULAR; INTRAVENOUS; SUBCUTANEOUS EVERY 5 MIN PRN
Status: DISCONTINUED | OUTPATIENT
Start: 2024-10-16 | End: 2024-10-16 | Stop reason: HOSPADM

## 2024-10-16 RX ORDER — IPRATROPIUM BROMIDE 0.5 MG/2.5ML
500 SOLUTION RESPIRATORY (INHALATION) EVERY 30 MIN PRN
Status: DISCONTINUED | OUTPATIENT
Start: 2024-10-16 | End: 2024-10-16 | Stop reason: HOSPADM

## 2024-10-16 RX ORDER — MEPERIDINE HYDROCHLORIDE 25 MG/ML
12.5 INJECTION INTRAMUSCULAR; INTRAVENOUS; SUBCUTANEOUS EVERY 10 MIN PRN
Status: DISCONTINUED | OUTPATIENT
Start: 2024-10-16 | End: 2024-10-16 | Stop reason: HOSPADM

## 2024-10-16 RX ORDER — FENTANYL CITRATE 50 UG/ML
50 INJECTION, SOLUTION INTRAMUSCULAR; INTRAVENOUS EVERY 5 MIN PRN
Status: DISCONTINUED | OUTPATIENT
Start: 2024-10-16 | End: 2024-10-16 | Stop reason: HOSPADM

## 2024-10-16 RX ORDER — LABETALOL HYDROCHLORIDE 5 MG/ML
5 INJECTION, SOLUTION INTRAVENOUS EVERY 5 MIN PRN
Status: DISCONTINUED | OUTPATIENT
Start: 2024-10-16 | End: 2024-10-16 | Stop reason: HOSPADM

## 2024-10-16 RX ORDER — ONDANSETRON HYDROCHLORIDE 2 MG/ML
4 INJECTION, SOLUTION INTRAVENOUS ONCE AS NEEDED
Status: DISCONTINUED | OUTPATIENT
Start: 2024-10-16 | End: 2024-10-16 | Stop reason: HOSPADM

## 2024-10-16 RX ORDER — METOCLOPRAMIDE HYDROCHLORIDE 5 MG/ML
10 INJECTION INTRAMUSCULAR; INTRAVENOUS ONCE
Status: COMPLETED | OUTPATIENT
Start: 2024-10-16 | End: 2024-10-16

## 2024-10-16 RX ORDER — CEFAZOLIN SODIUM 2 G/100ML
2 INJECTION, SOLUTION INTRAVENOUS ONCE
Status: DISCONTINUED | OUTPATIENT
Start: 2024-10-16 | End: 2024-10-16

## 2024-10-16 RX ORDER — BUPIVACAINE HYDROCHLORIDE 5 MG/ML
INJECTION, SOLUTION PERINEURAL AS NEEDED
Status: DISCONTINUED | OUTPATIENT
Start: 2024-10-16 | End: 2024-10-16 | Stop reason: HOSPADM

## 2024-10-16 RX ORDER — ALBUTEROL SULFATE 0.83 MG/ML
2.5 SOLUTION RESPIRATORY (INHALATION) EVERY 30 MIN PRN
Status: DISCONTINUED | OUTPATIENT
Start: 2024-10-16 | End: 2024-10-16 | Stop reason: HOSPADM

## 2024-10-16 RX ORDER — FENTANYL CITRATE 50 UG/ML
INJECTION, SOLUTION INTRAMUSCULAR; INTRAVENOUS AS NEEDED
Status: DISCONTINUED | OUTPATIENT
Start: 2024-10-16 | End: 2024-10-16

## 2024-10-16 RX ORDER — SODIUM CHLORIDE, SODIUM LACTATE, POTASSIUM CHLORIDE, CALCIUM CHLORIDE 600; 310; 30; 20 MG/100ML; MG/100ML; MG/100ML; MG/100ML
40 INJECTION, SOLUTION INTRAVENOUS CONTINUOUS
Status: DISCONTINUED | OUTPATIENT
Start: 2024-10-16 | End: 2024-10-16 | Stop reason: HOSPADM

## 2024-10-16 RX ORDER — FAMOTIDINE 10 MG/ML
20 INJECTION INTRAVENOUS ONCE
Status: COMPLETED | OUTPATIENT
Start: 2024-10-16 | End: 2024-10-16

## 2024-10-16 RX ORDER — MIDAZOLAM HYDROCHLORIDE 1 MG/ML
INJECTION, SOLUTION INTRAMUSCULAR; INTRAVENOUS AS NEEDED
Status: DISCONTINUED | OUTPATIENT
Start: 2024-10-16 | End: 2024-10-16

## 2024-10-16 RX ORDER — LIDOCAINE HYDROCHLORIDE 10 MG/ML
INJECTION, SOLUTION EPIDURAL; INFILTRATION; INTRACAUDAL; PERINEURAL AS NEEDED
Status: DISCONTINUED | OUTPATIENT
Start: 2024-10-16 | End: 2024-10-16

## 2024-10-16 SDOH — HEALTH STABILITY: MENTAL HEALTH: CURRENT SMOKER: 0

## 2024-10-16 ASSESSMENT — COLUMBIA-SUICIDE SEVERITY RATING SCALE - C-SSRS
6. HAVE YOU EVER DONE ANYTHING, STARTED TO DO ANYTHING, OR PREPARED TO DO ANYTHING TO END YOUR LIFE?: NO
1. IN THE PAST MONTH, HAVE YOU WISHED YOU WERE DEAD OR WISHED YOU COULD GO TO SLEEP AND NOT WAKE UP?: NO
2. HAVE YOU ACTUALLY HAD ANY THOUGHTS OF KILLING YOURSELF?: NO

## 2024-10-16 ASSESSMENT — PAIN - FUNCTIONAL ASSESSMENT
PAIN_FUNCTIONAL_ASSESSMENT: 0-10

## 2024-10-16 ASSESSMENT — PAIN SCALES - GENERAL
PAINLEVEL_OUTOF10: 0 - NO PAIN

## 2024-10-16 NOTE — OP NOTE
Tendon transfers to restore finger and thumb extension left forearm (L) Operative Note     Date: 10/16/2024  OR Location: [facility]    Name: Vilma Grover, : 1970, Age: 54 y.o., MRN: [id number], Sex: female    Diagnosis  Pre-op Diagnosis      * Injury of forearm muscle or tendon, left, initial encounter [C20.242R] Post-op Diagnosis     * Injury of forearm muscle or tendon, left, initial encounter [X97.604O]     Procedures  #1 left upper extremity FCR to EDC tendon transfer  #2 left upper extremity ring finger FDS to EPL tendon transfer      Surgeons      * Talon Gregorio - Primary    Resident/Fellow/Other Assistant:  Surgeons and Role:  * No surgeons found with a matching role *    Procedure Summary  Anesthesia: General  ASA: III  Anesthesia Staff: Anesthesiologist: Chalino Beltran DO  CRNA: АЛЕКСАНДР Bernstein-CRNA  Estimated Blood Loss: 5 mL  Intra-op Medications:   Administrations occurring from 0815 to 0950 on 10/16/24:   Medication Name Total Dose   dexAMETHasone 4 mg/mL 8 mg   fentaNYL (Sublimaze) injection 50 mcg/mL 250 mcg   ketorolac (Toradol) 30 mg 30 mg   lactated Ringer's infusion Cannot be calculated   lidocaine PF (Xylocaine-MPF) local injection 1 % 5 mL   midazolam (Versed) 1 mg/1 mL 2 mg   ondansetron 2 mg/mL 4 mg   propofol (Diprivan) injection 10 mg/mL 200 mg   rocuronium (ZeMuron) 50 mg/5 mL injection 80 mg   succinylcholine (Anectine) 20 mg/mL injection 200 mg   ceFAZolin (Ancef) 2 g in dextrose (iso)  mL 3 g              Anesthesia Record               Intraprocedure I/O Totals          Intake    ceFAZolin (Ancef) 2 g in dextrose (iso)  mL 150.00 mL    Total Intake 150 mL          Specimen: No specimens collected     Staff:   Circulator: Tania Bowdenub Person: Jairo Roth Circulator: Odalys Roth Scrub: Odalys         Drains and/or Catheters: * None in log *    Tourniquet Times:   * Missing tourniquet times found for documented tourniquets in lo *     Implants:      Findings: Findings consistent with prior injury of EDC muscle belly defect and posterior interosseous nerve injury    Indications: Vilma Grover is an 54 y.o. female who is having surgery for Injury of forearm muscle or tendon, left, initial encounter [S56.003K].      The patient was seen in the preoperative area. The risks, benefits, complications, treatment options, non-operative alternatives, expected recovery and outcomes were discussed with the patient. The possibilities of reaction to medication, pulmonary aspiration, injury to surrounding structures, bleeding, recurrent infection, the need for additional procedures, failure to diagnose a condition, and creating a complication requiring transfusion or operation were discussed with the patient. The patient concurred with the proposed plan, giving informed consent.  The site of surgery was properly noted/marked if necessary per policy. The patient has been actively warmed in preoperative area. Preoperative antibiotics have been ordered and given within 1 hours of incision. Venous thrombosis prophylaxis have been ordered including bilateral sequential compression devices    Procedure Details:   Patient is a 54-year-old female who presents today for tendon transfers to restore finger and thumb extension.  She was injured earlier this year when she was attempting to break up a fight between 2 dogs and sustained a significant soft tissue injury to the dorsal aspect of the left forearm resulting in injury to the muscle belly of her finger extensors as well as a posterior interosseous nerve defect resulting in inability to actively extend the fingers or thumb.  Her wound is well-healed.  She has no evidence of ongoing or active infection and is ready to proceed with tendon transfers.  Preoperatively the left arm was identified and marked for surgery.  Informed consent process was completed.    Patient was brought to the operating and placed supine on the  operating table.  A timeout procedure was performed to verify the correct patient, procedure and operative site.  Intravenous antibiotics were administered.  A general anesthetic was initiated by the anesthesia service.  Left upper extremity was prepped and draped in usual sterile fashion.  Limb was exsanguinated and tourniquet was inflated to 250 mmHg.    We began by making a dorsal longitudinal incision on the distal aspect of the forearm out to the level of the wrist.  We dissected through the subcutaneous tissue down to the fascia.  We identified the EDC and EPL muscle and tendons proximal to the extensor retinaculum.  The fascia was incised for further exposure of the structures.  We had to release the very proximal aspect of the fourth retinaculum to get enough exposure of the EDC tendons.  We then performed a side-to-side tenodesis of all of the tendons within the fourth compartment to establish the normal relative tension and posture of the fingers.  This was done at multiple intervals with 3-0 Ethibond suture.    We identified the EPL muscle and tendon in the same wound.  We divided the EPL at the myotendinous junction.    We made a second incision now longitudinally over the radial styloid and extending out towards the thumb metacarpal.  We dissected through the subcutaneous tissue.  Radial sensory nerve branches were identified mobilized and protected.  We exposed the first compartment.  Distal to the compartment we divided the APL and EPB tendons and then pulled these tendons through the retinaculum and divided these tendons again proximally at the myotendinous junction leaving us with an empty first compartment to uses a pulley for EPL tendon transfer.  We mobilized the EPL tendon retrieving it into this radial styloid wound.    We then made a volar incision over the ulnar border of the FCR tendon sheath in the volar forearm out to the level of the wrist flexion crease.  We dissected through the  subcutaneous tissue down to the deep fascia.  We incised the fascia overlying the FCR tendon and further expose the FCR tendon out to the level of the wrist flexion crease.  We then inspected for but did not find a palmaris longus tendon.  This was difficult to assess clinically preoperatively because of her body habitus.  Given the fact that she did not have a palmaris longus tendon we elected to use a ring finger FDS as the donor for our EPL tendon transfer.    Before dividing the FCR tendon distally we developed the interval down to the interosseous membrane ulnar to the FPL muscle belly.  Once the interosseous membrane was exposed we exposed the anterior interosseous nerve and artery.  The structures were protected.  We made a window in the interosseous membrane well proximal to the pronator quadratus.  We then divided the FCR tendon as distal as possible and passed this tendon pedicle through the interosseous membrane retrieving it dorsally in the forearm.  We then further exposed the FDS muscle bellies.  We exposed and identified the ring finger FDS and the forearm.  For additional length of this tendon graft we made an additional incision in the distal palmar crease in line with the ring finger flexor tendon.  We dissected down to the flexor tendon sheath.  We incised the sheath proximal to the A1 pulley and then divided the FDS tendon at this level.  The FDS was then pulled into the volar forearm wound out of the palm.  We passed the FDS tendon radially superficial to the median nerve which was well protected by the FDS muscle belly at this level and then retrieved the FDS tendon at the radial styloid incision passing the tendon deep to the radial artery and radial sensory nerves.  The FDS tendon was then passed through the first dorsal compartment so that both the distal aspect of the FDS and the proximal aspect of the FPL were at the the level of the thumb metacarpal.    Turning her attention now to the  dorsal forearm wound we divided the EDC muscle at the myotendinous junction since there was no potential for spontaneous reinnervation or recovery of this muscle.  The FCR tendon was passed through the distal EDC tendons.  Tension was established and then the tendon transfer was secured with a 3-0 Ethibond suture.  The wrist was taken through a range of motion to determine appropriate tension on the transfer.  Once the transfer tension was appropriate additional Pulvertaft weave's were performed passing the FCR through the EDC tendon multiple times and securing the tendon transfer each 1 of these junctions.    We now turned our attention now to the EPL.  The FDS tendon was passed through the distal aspect of the EPL tendon.  Tension was established and secured.  Tenodesis testing was done to confirm that we had appropriate tension on this transfer.  Additional tendon passages were performed and secured at each junction.    We again took the wrist and hand through a range of motion determining that tension was appropriate on both the tendon transfers to restore thumb extension and finger extension.  The wounds were all copiously irrigated and then closed in interrupted fashion.  Sterile bandages were applied.  The tourniquet was deflated.  The patient was placed into a resting volar splint holding the wrist fingers and thumb into a position of extension.  She was awoken from her anesthetic and transferred the recovery in stable condition.    Was operatively she will be discharged home once comfortable.  She will remain in her splint until she returns to clinic in 2 weeks at which point we will refer her to occupational therapy for formal rehabilitation for these tendon transfers.        Complications:  None; patient tolerated the procedure well.    Disposition: PACU - hemodynamically stable.  Condition: stable         Additional Details:      Attending Attestation: I was present and scrubbed for the entire  procedure.    Talon Gregoiro  Phone Number: [phone]

## 2024-10-16 NOTE — ANESTHESIA POSTPROCEDURE EVALUATION
Patient: Vilma Grover    Procedure Summary       Date: 10/16/24 Room / Location: [facility] 04 / [facility]    Anesthesia Start: 0838 Anesthesia Stop: 1058    Procedure: Tendon transfers to restore finger and thumb extension left forearm (Left: Hand) Diagnosis:       Injury of forearm muscle or tendon, left, initial encounter      (Injury of forearm muscle or tendon, left, initial encounter [S56.902A])    Surgeons: Talon Gregorio MD Responsible Provider: Chalino Beltran DO    Anesthesia Type: general ASA Status: 3            Anesthesia Type: general    Vitals Value Taken Time   /84 10/16/24 1105   Temp 36.1 °C (97 °F) 10/16/24 1052   Pulse 81 10/16/24 1105   Resp 14 10/16/24 1105   SpO2 96 % 10/16/24 1105       Anesthesia Post Evaluation    Patient location during evaluation: bedside  Patient participation: complete - patient participated  Level of consciousness: awake and alert  Pain management: adequate  Multimodal analgesia pain management approach  Airway patency: patent  Two or more strategies used to mitigate risk of obstructive sleep apnea  Cardiovascular status: acceptable  Respiratory status: acceptable  Hydration status: acceptable  Postoperative Nausea and Vomiting: none        No notable events documented.

## 2024-10-16 NOTE — ANESTHESIA PROCEDURE NOTES
Airway  Date/Time: 10/16/2024 8:44 AM  Urgency: elective    Airway not difficult    Staffing  Performed: CRNA   Authorized by: Chalino Beltran DO    Performed by: АЛЕКСАДНР Bernstein-CRNA  Patient location during procedure: OR    Indications and Patient Condition  Indications for airway management: anesthesia and airway protection  Spontaneous ventilation: present  Sedation level: deep  Preoxygenated: yes  Patient position: sniffing  Mask difficulty assessment: 0 - not attempted    Final Airway Details  Final airway type: endotracheal airway      Successful airway: ETT  Cuffed: yes   Successful intubation technique: direct laryngoscopy  Facilitating devices/methods: intubating stylet and cricoid pressure  Endotracheal tube insertion site: oral  Blade: Beatrice  Blade size: #4  ETT size (mm): 7.0  Cormack-Lehane Classification: grade I - full view of glottis  Placement verified by: chest auscultation and capnometry   Cuff volume (mL): 7  Measured from: lips  ETT to lips (cm): 22  Number of attempts at approach: 1

## 2024-10-16 NOTE — DISCHARGE INSTRUCTIONS
Post-Operative Instructions  Dr. Talon Gregorio [phone]    Dressing:  You have a bandage or splint covering your operative site.    Do not remove the dressing until your next scheduled appointment with your surgeon or therapist. Keep your dressing clean and dry. The dressing will be removed at that appointment.     Post Anesthesia Instructions:  If you received general anesthesia or IV sedation for your procedure for the next 24 hours: No driving, no drinking alcohol, no sleeping aids, no important decision making, and have an adult with you for 24 hours.    Showering:  You may shower following surgery but please adhere to above instructions regarding the dressing. If showering with bandage/splint in place please ensure that it is kept dry and covered while bathing. There are commercially available cast bags that can be used to protect your dressing while showering. If using garbage bags please make sure that there are no holes in the bag and that the bag has been sealed above the dressing. If the bandage gets wet you must contact your surgeon's office to make arrangements to be seen to have the bandage changed.     Ice/Elevation:  The application of ice to your surgical site after surgery will help with pain control and swelling. This can be very effective for 48-72 hours after surgery. Please be careful to avoid getting bandage wet from a leaky ice bag. Please be advised that the ice may need to be applied for longer periods of time for the cooling effect to penetrate the post-operative dressing.     Elevation of the operative site above the level of the heart as much as possible for the first 48-72 hours after surgery. Use your sling if you have been provided one while standing or walking. If your fingers are not included in the dressing you are encouraged to attempt finger range of motion as this will help with your hand swelling and ultimate recovery.    Pain Medication:  If you received a regional  anesthetic on the day of your surgery your arm and hand may be numb for up to 24 hours after surgery. It is important to wear your sling while the block is still effective in order to protect your arm. It is advisable to take pain medications prior to going to sleep in case the regional anesthesia medication wears off while you are sleeping.    Take your pain medications as directed. Narcotic pain medications can cause lethargy, nausea and constipation. Please contact your surgeon's office and discontinue the medication if these symptoms become problematic. Eating a regular diet, drinking fluids and walking can help with constipation from these medications. Avoid alcohol consumption and driving while taking narcotic pain medications.     Additional pain control options:     You are encouraged to take over the counter medications like Advil / Motrin / Ibuprofen / Aleve in addition to your prescribed pain medications after surgery.    Smoking/Tobacco:  Tobacco use is known to interfere with wound and fracture healing and increase post-operative pain. It can also increase your risk of poor outcomes following surgery. Please do not use tobacco or nicotine products following your surgery. This includes smokeless tobacco, or nicotine replacement products (patches, gum, etc.).    Driving:  It is not advisable to operate a vehicle while using narcotic pain medications. Additionally, please be advised that you are likely to have challenges operating a car or motorcycle while you are still in your postoperative dressing and this could increase your risk of being involved in an accident and being cited for driving while physically impaired.     Warning Signs:  Observe your arm/hand and incision site (if visible) for increased redness, inflammation, drainage, odor or pain that is unrelieved by rest, elevation or medication. Please contact your surgeon's office immediately if you develop any of these issues or if you develop a  fever greater than 101°.    Follow Up Appointments:  Your post-operative appointment has been scheduled for 10/28/2024 at 10:30 am    [facility], [address] [place], [place], [address]

## 2024-10-16 NOTE — PERIOPERATIVE NURSING NOTE
Patient drowsy but arousable. Surgical site clean, dry and intact with arm elevated with pillow. Patient denies pain at this time. Good cap refill on left fingers. Patient tolerating small sips of water. VS stable.

## 2024-10-18 ASSESSMENT — PAIN SCALES - GENERAL: PAINLEVEL_OUTOF10: 7

## 2024-10-23 NOTE — PROGRESS NOTES
55 yo presents with concern about vulva lesions She is w prelvic pain or or abnormal discharge.     Subjective   Patient ID: Vilma Grover is a 54 y.o. female who presents for Vaginal Bump (Patient reports small painful bump on left labia, States it's painful and warm compresses give no relief.).  HPI    Review of Systems    Objective   Physical Exam  Exam conducted with a chaperone present.   HENT:      Head: Normocephalic.      Right Ear: External ear normal.      Left Ear: External ear normal.      Mouth/Throat:      Mouth: Mucous membranes are moist.   Eyes:      Extraocular Movements: Extraocular movements intact.      Pupils: Pupils are equal, round, and reactive to light.   Abdominal:      Palpations: Abdomen is soft.   Genitourinary:     Labia:         Right: No rash.         Left: No rash.    Skin:     General: Skin is warm.   Neurological:      Mental Status: She is alert and oriented to person, place, and time.   Psychiatric:         Mood and Affect: Mood normal.         A/P Vulvar lesion    -  Exam c/w sebaceous cyst and ingrown hair    -  vulvar care d/w the patient     RTC for APE   RTC with any AUB for follow up

## 2024-10-25 NOTE — PROGRESS NOTES
[facility]  Hand and Upper Extremity Service  Post Operative visit         Date of surgery: 10/16/24    Surgery(s) performed: Tendon transfers to restore finger and thumb extension left forearm        Subjective report: First postoperative visit. Pain is well controlled with no new concerns.        Exam findings: Left arm reveals healing surgical incision. Good resting posture of all digits.        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 Restrictions of the hand were reviewed and she'll see  me in 4 weeks. At that time we'll discuss potential to allow her to be more aggressive with therapy efforts.         Follow Up: 4 weeks            Talon Gregorio MD    [facility] [employer]  Department of Orthopaedic Surgery  Chief of Hand and Upper Extremity Surgery  [facility]    Scribe Attestation  By signing my name below, ISepideh , Naz   attest that this documentation has been prepared under the direction and in the presence of Dr. Talon Gregorio.      Dictation performed with the use of voice recognition software. Syntax and grammatical errors may exist.

## 2024-10-28 ENCOUNTER — EVALUATION (OUTPATIENT)
Dept: OCCUPATIONAL THERAPY | Facility: CLINIC | Age: 54
End: 2024-10-28
Payer: COMMERCIAL

## 2024-10-28 ENCOUNTER — APPOINTMENT (OUTPATIENT)
Dept: ORTHOPEDIC SURGERY | Facility: CLINIC | Age: 54
End: 2024-10-28
Payer: COMMERCIAL

## 2024-10-28 VITALS — WEIGHT: 293 LBS | BODY MASS INDEX: 50.02 KG/M2 | HEIGHT: 64 IN

## 2024-10-28 DIAGNOSIS — S56.902A OPEN WOUND OF LEFT FOREARM WITH TENDON INVOLVEMENT, INITIAL ENCOUNTER: Primary | ICD-10-CM

## 2024-10-28 DIAGNOSIS — S51.802A OPEN WOUND OF LEFT FOREARM WITH TENDON INVOLVEMENT, INITIAL ENCOUNTER: Primary | ICD-10-CM

## 2024-10-28 DIAGNOSIS — S51.802A OPEN WOUND OF LEFT FOREARM WITH TENDON INVOLVEMENT, INITIAL ENCOUNTER: ICD-10-CM

## 2024-10-28 DIAGNOSIS — S56.902A OPEN WOUND OF LEFT FOREARM WITH TENDON INVOLVEMENT, INITIAL ENCOUNTER: ICD-10-CM

## 2024-10-28 PROCEDURE — 3044F HG A1C LEVEL LT 7.0%: CPT | Performed by: ORTHOPAEDIC SURGERY

## 2024-10-28 PROCEDURE — 99024 POSTOP FOLLOW-UP VISIT: CPT | Performed by: ORTHOPAEDIC SURGERY

## 2024-10-28 PROCEDURE — 3061F NEG MICROALBUMINURIA REV: CPT | Performed by: ORTHOPAEDIC SURGERY

## 2024-10-28 PROCEDURE — 3048F LDL-C <100 MG/DL: CPT | Performed by: ORTHOPAEDIC SURGERY

## 2024-10-28 PROCEDURE — L3808 WHFO, RIGID W/O JOINTS: HCPCS | Performed by: OCCUPATIONAL THERAPIST

## 2024-10-28 PROCEDURE — 3008F BODY MASS INDEX DOCD: CPT | Performed by: ORTHOPAEDIC SURGERY

## 2024-10-28 PROCEDURE — 1036F TOBACCO NON-USER: CPT | Performed by: ORTHOPAEDIC SURGERY

## 2024-10-28 ASSESSMENT — PAIN SCALES - GENERAL: PAINLEVEL_OUTOF10: 5 - MODERATE PAIN

## 2024-10-28 ASSESSMENT — PAIN - FUNCTIONAL ASSESSMENT: PAIN_FUNCTIONAL_ASSESSMENT: 0-10

## 2024-10-28 ASSESSMENT — PAIN DESCRIPTION - DESCRIPTORS: DESCRIPTORS: ACHING;SORE

## 2024-10-30 ENCOUNTER — OFFICE VISIT (OUTPATIENT)
Dept: URGENT CARE | Age: 54
End: 2024-10-30
Payer: COMMERCIAL

## 2024-10-30 ENCOUNTER — TREATMENT (OUTPATIENT)
Dept: OCCUPATIONAL THERAPY | Facility: CLINIC | Age: 54
End: 2024-10-30
Payer: COMMERCIAL

## 2024-10-30 VITALS
BODY MASS INDEX: 50.02 KG/M2 | OXYGEN SATURATION: 99 % | RESPIRATION RATE: 18 BRPM | TEMPERATURE: 99.3 F | WEIGHT: 293 LBS | HEART RATE: 84 BPM | SYSTOLIC BLOOD PRESSURE: 131 MMHG | DIASTOLIC BLOOD PRESSURE: 82 MMHG | HEIGHT: 64 IN

## 2024-10-30 DIAGNOSIS — S56.901D: ICD-10-CM
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
## 2024-10-30 DIAGNOSIS — H81.11 BENIGN PAROXYSMAL POSITIONAL VERTIGO OF RIGHT EAR: Primary | ICD-10-CM

## 2024-10-30 PROCEDURE — 3044F HG A1C LEVEL LT 7.0%: CPT | Performed by: NURSE PRACTITIONER

## 2024-10-30 PROCEDURE — 3048F LDL-C <100 MG/DL: CPT | Performed by: NURSE PRACTITIONER

## 2024-10-30 PROCEDURE — 99203 OFFICE O/P NEW LOW 30 MIN: CPT | Performed by: NURSE PRACTITIONER

## 2024-10-30 PROCEDURE — 3079F DIAST BP 80-89 MM HG: CPT | Performed by: NURSE PRACTITIONER

## 2024-10-30 PROCEDURE — 3075F SYST BP GE 130 - 139MM HG: CPT | Performed by: NURSE PRACTITIONER

## 2024-10-30 PROCEDURE — 3061F NEG MICROALBUMINURIA REV: CPT | Performed by: NURSE PRACTITIONER

## 2024-10-30 PROCEDURE — 3008F BODY MASS INDEX DOCD: CPT | Performed by: NURSE PRACTITIONER

## 2024-10-30 PROCEDURE — 97140 MANUAL THERAPY 1/> REGIONS: CPT | Mod: GO | Performed by: OCCUPATIONAL THERAPIST

## 2024-10-30 PROCEDURE — 97763 ORTHC/PROSTC MGMT SBSQ ENC: CPT | Mod: GO | Performed by: OCCUPATIONAL THERAPIST

## 2024-10-30 ASSESSMENT — ENCOUNTER SYMPTOMS: DIZZINESS: 1

## 2024-11-04 ENCOUNTER — TREATMENT (OUTPATIENT)
Dept: OCCUPATIONAL THERAPY | Facility: CLINIC | Age: 54
End: 2024-11-04
Payer: COMMERCIAL

## 2024-11-04 DIAGNOSIS — S56.902D: ICD-10-CM
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
## 2024-11-04 PROCEDURE — 97140 MANUAL THERAPY 1/> REGIONS: CPT | Mod: GO | Performed by: OCCUPATIONAL THERAPIST

## 2024-11-04 NOTE — PROGRESS NOTES
"  Occupational Therapy Orthopedic Treatment note    Patient Name: Vilma Grover  MRN: [id number]  Today's Date: 11/7/2024  Time Calculation  Start Time: 1145  Stop Time: 1225  Time Calculation (min): 40 min   , OT Therapeutic Procedures Time Entry  Manual Therapy Time Entry: 38,      Insurance:  Visit number: 3 of 22  Authorization info:  none required    Current Problem  1. Dog bite, subsequent encounter        2. Injury of forearm muscle or tendon, left, subsequent encounter              Referred by:  Dr. Gregorio  Reason for visit:     Subjective   \"I have not washed my hand since I saw you last I wasn't sure if I could\"  RICKY: Dog bite  DOI: 6/2/24  DOS: 10/16/24  Walk in Patient? Yes  Work Related Injury? No    Hand dominance: Right  Effected extremity: Left    Medical History Form: Reviewed (scanned into chart)  Prior Level of Function (PLOF)  Patient previously mod I with all ADLs/IADLs    ADL/IADL Deficits:  Bathing, Dressing, Hygiene/Grooming, Hair care, Sleep, Home mgt, Meal prep, and Work      Precautions:   NWB  Tendon transfer protocol      Pain:   1/10  Location: L radial wrist surgical site and 1sst web space  Description: pulling and throbbing  Aggravating Factors:  n/a  Relieving Factors:  Rest and Elevation      Patients Living Environment: Reviewed and no concern  Lives with: Alone  Primary Language: English  Patient's Goal(s) for Therapy: \"to get movement back into my hand\"      Objective     LEFT HAND   (ext/flex)   MCP PIP DIP DPC   IF  0      MF 0      RF 0      SF 0      Thumb       Thumb RABD       Thumb PABD         R hand figure 8:  44cm  L hand figure 8:  41.1cm      Wound:  - sutures intact  - steri strips intact    Edema:  -  min      Sensation:  - intact light touch  - hypersensitivity  - paraesthesias    Performance Impacted:   - ROM   - Joint mobility   - Strength   - Sensibility   - Pain   - Wound   - Scar    Outcome Measures:  Quick DASH:           TODAY'S TREATMENT    Manual:  - " PROM all digits PIP and DIPJs x 10 min  - retrograde massage and MEM to dorsal/volar forearm/wrist/hand  - STM around surgical sites to breakdown adhesions and tissue tension        EDUCATION: Risk/benefits discussed, Home exercise program, orthosis wear schedule/ purpose/ precautions, care of orthosis, wound care, edema control, activity modifications, joint protection, pain/symptom management, injury pathology, and plan of care,        Orthosis purpose:  - protect  - position  - immobilize    Wear Schedule:  - At all times   - may remove for bathing/dressing/hygiene   - may not remove for HEP   - during activity/work   - while sleeping    Assessment:   Tolerated all manual intervention well today.  She exhibited less dorsal hand swelling indicative of presence of wrinkle return post MEM and retrograde massage.  Understands the importanc eof hand hygiene multiple times a day with a bath towel and soap as there was a prominent odor at the start of treatment.      Level of Complexity  MOD complexity    OT Rehab Potential  Excellent      Plan:  Planned Interventions include: therapeutic exercise, self-care home management, manual therapy, therapeutic activities, , neuromuscular coordination, vasopneumatic, dry needling, and orthosis fabrication.  Frequency: 2 x Week  Duration: 12 Weeks  Goals: Set and discussed today      Goals - Patient will:       Goal 1) verbalize/demonstrate/understanding of diagnosis and precautions       Goal 2) verbalize purpose of orthosis, wearing schedule, care and precautions       Goal 3) don/doff orthosis correctly and independently       Goal 4) verbalize/demonstrate home program, activity modification and/or adaptive equipment    All goals set today and have been met.    Plan of care was developed with input and agreement by the patient      Katelynn Chamberlain OT

## 2024-11-05 ENCOUNTER — TELEPHONE (OUTPATIENT)
Dept: OBSTETRICS AND GYNECOLOGY | Facility: CLINIC | Age: 54
End: 2024-11-05
Payer: COMMERCIAL

## 2024-11-05 NOTE — TELEPHONE ENCOUNTER
Est pt last seen 10/15/2024 Vulvar lesion / pt left vm requesting appt with Dr No for bladder lift / message to Dr No for recommendation

## 2024-11-08 ENCOUNTER — TREATMENT (OUTPATIENT)
Dept: OCCUPATIONAL THERAPY | Facility: CLINIC | Age: 54
End: 2024-11-08
Payer: COMMERCIAL

## 2024-11-08 DIAGNOSIS — S56.901D: Primary | ICD-10-CM
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
## 2024-11-08 PROCEDURE — 97140 MANUAL THERAPY 1/> REGIONS: CPT | Mod: GO | Performed by: OCCUPATIONAL THERAPIST

## 2024-11-08 NOTE — PROGRESS NOTES
"  Occupational Therapy Orthopedic Treatment note    Patient Name: Vilma Grover  MRN: [id number]  Today's Date: 11/13/2024  Time Calculation  Start Time: 1245  Stop Time: 1325  Time Calculation (min): 40 min   , OT Therapeutic Procedures Time Entry  Manual Therapy Time Entry: 38,      Insurance:  Visit number: 4 of 22  Authorization info:  none required    Current Problem  1. Injury of forearm muscle or tendon, right, subsequent encounter        2. Dog bite, subsequent encounter            Referred by:  Dr. Gregorio  Reason for visit:     Subjective   \"Im still pretty swollen and I elevate as much as I can\"  RICKY: Dog bite  DOI: 6/2/24  DOS: 10/16/24  Walk in Patient? Yes  Work Related Injury? No    Hand dominance: Right  Effected extremity: Left    Medical History Form: Reviewed (scanned into chart)  Prior Level of Function (PLOF)  Patient previously mod I with all ADLs/IADLs    ADL/IADL Deficits:  Bathing, Dressing, Hygiene/Grooming, Hair care, Sleep, Home mgt, Meal prep, and Work      Precautions:   NWB  Tendon transfer protocol      Pain:   1/10  Location: L radial wrist surgical site and 1sst web space  Description: pulling and throbbing  Aggravating Factors:  n/a  Relieving Factors:  Rest and Elevation      Patients Living Environment: Reviewed and no concern  Lives with: Alone  Primary Language: English  Patient's Goal(s) for Therapy: \"to get movement back into my hand\"      Objective     LEFT HAND   (ext/flex)   MCP PIP DIP DPC   IF  0      MF 0      RF 0      SF 0      Thumb       Thumb RABD       Thumb PABD         R hand figure 8:  44cm  L hand figure 8:  41.1cm      Wound:  - sutures intact  - steri strips intact    Edema:  -  min      Sensation:  - intact light touch  - hypersensitivity  - paraesthesias    Performance Impacted:   - ROM   - Joint mobility   - Strength   - Sensibility   - Pain   - Wound   - Scar    Outcome Measures:  Quick DASH:           TODAY'S TREATMENT    Manual:  - PROM all digits PIP " and DIPJs x 10 min  - retrograde massage and MEM to dorsal/volar forearm/wrist/hand  - STM around surgical sites to breakdown adhesions and tissue tension  - scar massage to all surgical sites to reduce underlying adhesion sand improve tendon gliding      EDUCATION: Risk/benefits discussed, Home exercise program, orthosis wear schedule/ purpose/ precautions, care of orthosis, wound care, edema control, activity modifications, joint protection, pain/symptom management, injury pathology, and plan of care,        Orthosis purpose:  - protect  - position  - immobilize    Wear Schedule:  - At all times   - may remove for bathing/dressing/hygiene   - may not remove for HEP   - during activity/work   - while sleeping    Assessment:   Tolerated all manual intervention well today.  She exhibited less dorsal hand swelling indicative of presence of wrinkle return post MEM and retrograde massage.  Understands upgraded HEP to begin finger extension within orthosis,    Level of Complexity  MOD complexity    OT Rehab Potential  Excellent      Plan:  Planned Interventions include: therapeutic exercise, self-care home management, manual therapy, therapeutic activities, , neuromuscular coordination, vasopneumatic, dry needling, and orthosis fabrication.  Frequency: 2 x Week  Duration: 12 Weeks  Goals: Set and discussed today      Goals - Patient will:       Goal 1) verbalize/demonstrate/understanding of diagnosis and precautions       Goal 2) verbalize purpose of orthosis, wearing schedule, care and precautions       Goal 3) don/doff orthosis correctly and independently       Goal 4) verbalize/demonstrate home program, activity modification and/or adaptive equipment    All goals set today and have been met.    Plan of care was developed with input and agreement by the patient      Katelynn Chamberlain OT

## 2024-11-11 ENCOUNTER — TREATMENT (OUTPATIENT)
Dept: OCCUPATIONAL THERAPY | Facility: CLINIC | Age: 54
End: 2024-11-11
Payer: COMMERCIAL
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
## 2024-11-11 DIAGNOSIS — S56.901D: ICD-10-CM

## 2024-11-11 PROCEDURE — 97110 THERAPEUTIC EXERCISES: CPT | Mod: GO | Performed by: OCCUPATIONAL THERAPIST

## 2024-11-11 PROCEDURE — 97140 MANUAL THERAPY 1/> REGIONS: CPT | Mod: GO | Performed by: OCCUPATIONAL THERAPIST

## 2024-11-11 NOTE — PROGRESS NOTES
"  Occupational Therapy Orthopedic Treatment note    Patient Name: Vilma Grover  MRN: [id number]  Today's Date: 11/14/2024  Time Calculation  Start Time: 0350  Stop Time: 0430  Time Calculation (min): 40 min   , OT Therapeutic Procedures Time Entry  Manual Therapy Time Entry: 25  Therapeutic Exercise Time Entry: 13,      Insurance:  Visit number: 5 of 22  Authorization info:  none required    Current Problem  1. Dog bite, subsequent encounter        2. Injury of forearm muscle or tendon, right, subsequent encounter            Referred by:  Dr. Gregorio  Reason for visit:     Subjective     RICKY: Dog bite  DOI: 6/2/24  DOS: 10/16/24  Walk in Patient? Yes  Work Related Injury? No    Hand dominance: Right  Effected extremity: Left    Medical History Form: Reviewed (scanned into chart)  Prior Level of Function (PLOF)  Patient previously mod I with all ADLs/IADLs    ADL/IADL Deficits:  Bathing, Dressing, Hygiene/Grooming, Hair care, Sleep, Home mgt, Meal prep, and Work      Precautions:   NWB  Tendon transfer protocol      Pain:   1/10  Location: L radial wrist surgical site and 1sst web space  Description: pulling and throbbing  Aggravating Factors:  n/a  Relieving Factors:  Rest and Elevation      Patients Living Environment: Reviewed and no concern  Lives with: Alone  Primary Language: English  Patient's Goal(s) for Therapy: \"to get movement back into my hand\"      Objective     LEFT HAND   (ext/flex)   MCP PIP DIP DPC   IF  0      MF 0      RF 0      SF 0      Thumb       Thumb RABD       Thumb PABD         R hand figure 8:  44cm  L hand figure 8:  41.1cm      Wound:  - sutures intact  - steri strips intact    Edema:  -  min      Sensation:  - intact light touch  - hypersensitivity  - paraesthesias    Performance Impacted:   - ROM   - Joint mobility   - Strength   - Sensibility   - Pain   - Wound   - Scar    Outcome Measures:  Quick DASH:           TODAY'S TREATMENT    Manual:  - PROM all digits PIP and DIPJs x 10 " min  - retrograde massage and MEM to dorsal/volar forearm/wrist/hand  - STM around surgical sites to breakdown adhesions and tissue tension  - scar massage to all surgical sites to reduce underlying adhesion sand improve tendon gliding    Therapeutic ex:  - partial composite fist flexion and extension in neutral x 25rps  - partial wrist ext/flex with partial finger flexion x 25 rps  - partial thumb extension/flexion x 25 rps  - partial tenodesis AROM x 25 rps  - reviewed HEP to ensure proper technique and carryover to home x 5 min      EDUCATION: Risk/benefits discussed, Home exercise program, orthosis wear schedule/ purpose/ precautions, care of orthosis, wound care, edema control, activity modifications, joint protection, pain/symptom management, injury pathology, and plan of care,        Orthosis purpose:  - protect  - position  - immobilize    Wear Schedule:  - At all times   - may remove for bathing/dressing/hygiene   - may not remove for HEP   - during activity/work   - while sleeping    Assessment:   Tolerated all manual intervention well today.  She exhibited less dorsal hand swelling indicative of presence of wrinkle return post MEM and retrograde massage.  Correctly performed upgraded exercises and understands these must be performed 4x/day at only 1 set of 25 rps, per tendon transfer protocol.  She exhibits correct technique and is able to actively extended all digits without a lag present,      Level of Complexity  MOD complexity    OT Rehab Potential  Excellent      Plan:  Planned Interventions include: therapeutic exercise, self-care home management, manual therapy, therapeutic activities, , neuromuscular coordination, vasopneumatic, dry needling, and orthosis fabrication.  Frequency: 2 x Week  Duration: 12 Weeks  Goals: Set and discussed today      Goals - Patient will:       Goal 1) verbalize/demonstrate/understanding of diagnosis and precautions       Goal 2) verbalize purpose of orthosis, wearing  schedule, care and precautions       Goal 3) don/doff orthosis correctly and independently       Goal 4) verbalize/demonstrate home program, activity modification and/or adaptive equipment    All goals set today and have been met.    Plan of care was developed with input and agreement by the patient      Katelynn Chamberlain OT

## 2024-11-13 ENCOUNTER — TREATMENT (OUTPATIENT)
Dept: OCCUPATIONAL THERAPY | Facility: CLINIC | Age: 54
End: 2024-11-13
Payer: COMMERCIAL

## 2024-11-13 DIAGNOSIS — S56.901D: ICD-10-CM
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
## 2024-11-13 PROCEDURE — 97110 THERAPEUTIC EXERCISES: CPT | Mod: GO | Performed by: OCCUPATIONAL THERAPIST

## 2024-11-13 PROCEDURE — 97140 MANUAL THERAPY 1/> REGIONS: CPT | Mod: GO | Performed by: OCCUPATIONAL THERAPIST

## 2024-11-13 NOTE — PROGRESS NOTES
"  Occupational Therapy Orthopedic Treatment note    Patient Name: Vilma Grover  MRN: [id number]  Today's Date: 11/14/2024  Time Calculation  Start Time: 0350  Stop Time: 0430  Time Calculation (min): 40 min   , OT Therapeutic Procedures Time Entry  Manual Therapy Time Entry: 25  Therapeutic Exercise Time Entry: 13,      Insurance:  Visit number: 6 of 22  Authorization info:  none required    Current Problem  1. Dog bite, subsequent encounter        2. Injury of forearm muscle or tendon, right, subsequent encounter            Referred by:  Dr. Gregorio  Reason for visit:     Subjective   \"Im doing the exercises as instructed. Im pretty sore and fatigued after them.  My swelling continues to go down also\"    RICKY: Dog bite  DOI: 6/2/24  DOS: 10/16/24  Walk in Patient? Yes  Work Related Injury? No    Hand dominance: Right  Effected extremity: Left    Medical History Form: Reviewed (scanned into chart)  Prior Level of Function (PLOF)  Patient previously mod I with all ADLs/IADLs    ADL/IADL Deficits:  Bathing, Dressing, Hygiene/Grooming, Hair care, Sleep, Home mgt, Meal prep, and Work      Precautions:   NWB  Tendon transfer protocol      Pain:   1/10  Location: L radial wrist surgical site and 1sst web space  Description: pulling and throbbing  Aggravating Factors:  n/a  Relieving Factors:  Rest and Elevation      Patients Living Environment: Reviewed and no concern  Lives with: Alone  Primary Language: English  Patient's Goal(s) for Therapy: \"to get movement back into my hand\"      Objective     LEFT HAND   (ext/flex)   MCP PIP DIP DPC   IF  0      MF 0      RF 0      SF 0      Thumb       Thumb RABD       Thumb PABD         R hand figure 8:  44cm  L hand figure 8:  41.1cm      Wound:  - sutures intact  - steri strips intact    Edema:  -  min      Sensation:  - intact light touch  - hypersensitivity  - paraesthesias    Performance Impacted:   - ROM   - Joint mobility   - Strength   - Sensibility   - Pain   - Wound   - " Scar    Outcome Measures:  Quick DASH:           TODAY'S TREATMENT    Manual:  - PROM all digits PIP and DIPJs x 10 min  - retrograde massage and MEM to dorsal/volar forearm/wrist/hand  - STM around surgical sites to breakdown adhesions and tissue tension  - scar massage to all surgical sites to reduce underlying adhesion sand improve tendon gliding    Therapeutic ex:  - partial composite fist flexion and extension in neutral x 25rps  - partial wrist ext/flex with partial finger flexion x 25 rps  - partial thumb extension/flexion x 25 rps  - partial tenodesis AROM x 25 rps    EDUCATION: Risk/benefits discussed, Home exercise program, orthosis wear schedule/ purpose/ precautions, care of orthosis, wound care, edema control, activity modifications, joint protection, pain/symptom management, injury pathology, and plan of care,        Orthosis purpose:  - protect  - position  - immobilize    Wear Schedule:  - At all times   - may remove for bathing/dressing/hygiene   - may not remove for HEP   - during activity/work   - while sleeping    Assessment:   Tolerated all manual intervention well today.  Correctly performing the above exercises using adequate rest for active recovery.  Progressing well to this point and adhering to all restrictions and orthosis wear.      Level of Complexity  MOD complexity    OT Rehab Potential  Excellent      Plan:  Planned Interventions include: therapeutic exercise, self-care home management, manual therapy, therapeutic activities, , neuromuscular coordination, vasopneumatic, dry needling, and orthosis fabrication.  Frequency: 2 x Week  Duration: 12 Weeks  Goals: Set and discussed today      Goals - Patient will:       Goal 1) verbalize/demonstrate/understanding of diagnosis and precautions       Goal 2) verbalize purpose of orthosis, wearing schedule, care and precautions       Goal 3) don/doff orthosis correctly and independently       Goal 4) verbalize/demonstrate home program, activity  modification and/or adaptive equipment    All goals set today and have been met.    Plan of care was developed with input and agreement by the patient      Katelynn Chamberlain OT

## 2024-11-18 ENCOUNTER — TREATMENT (OUTPATIENT)
Dept: OCCUPATIONAL THERAPY | Facility: CLINIC | Age: 54
End: 2024-11-18
Payer: COMMERCIAL

## 2024-11-20 ENCOUNTER — TREATMENT (OUTPATIENT)
Dept: OCCUPATIONAL THERAPY | Facility: CLINIC | Age: 54
End: 2024-11-20
Payer: COMMERCIAL

## 2024-11-20 DIAGNOSIS — S56.901D: Primary | ICD-10-CM

## 2024-11-20 PROCEDURE — 97110 THERAPEUTIC EXERCISES: CPT | Mod: GO | Performed by: OCCUPATIONAL THERAPIST

## 2024-11-20 PROCEDURE — 97140 MANUAL THERAPY 1/> REGIONS: CPT | Mod: GO | Performed by: OCCUPATIONAL THERAPIST

## 2024-11-20 NOTE — PROGRESS NOTES
"  Occupational Therapy Orthopedic Treatment note    Patient Name: Vilma Grover  MRN: [id number]  Today's Date: 11/20/2024  Time Calculation  Start Time: 0130  Stop Time: 0210  Time Calculation (min): 40 min   , OT Therapeutic Procedures Time Entry  Manual Therapy Time Entry: 15  Therapeutic Exercise Time Entry: 10, OT Modalities Time Entry  Ultrasound Time Entry: 8    Insurance:  Visit number: 7 of 22  Authorization info:  none required    Current Problem  1. Injury of forearm muscle or tendon, right, subsequent encounter            Referred by:  Dr. Gregorio  Reason for visit:     Subjective   \"The exercises make me sore but nothing more than that. Im a little frustrated with the lack of wrist motion but the surgeon said that would happen\"  RICKY: Dog bite  DOI: 6/2/24  DOS: 10/16/24    FCR to EDC  RF FDS to EPL  Walk in Patient? Yes  Work Related Injury? No    Hand dominance: Right  Effected extremity: Left    Medical History Form: Reviewed (scanned into chart)  Prior Level of Function (PLOF)  Patient previously mod I with all ADLs/IADLs    ADL/IADL Deficits:  Bathing, Dressing, Hygiene/Grooming, Hair care, Sleep, Home mgt, Meal prep, and Work      Precautions:   NWB  Tendon transfer protocol      Pain:   1/10  Location: L radial wrist surgical site and 1sst web space  Description: pulling and throbbing  Aggravating Factors:  n/a  Relieving Factors:  Rest and Elevation      Patients Living Environment: Reviewed and no concern  Lives with: Alone  Primary Language: English  Patient's Goal(s) for Therapy: \"to get movement back into my hand\"      Objective     LEFT HAND   (ext/flex)   MCP PIP DIP DPC   IF  0      MF 0      RF 0      SF 0      Thumb       Thumb RABD       Thumb PABD         R hand figure 8:  44cm  L hand figure 8:  41.1cm      Edema:  -  min    Sensation:  - intact light touch  - hypersensitivity  - paraesthesias    Performance Impacted:   - ROM   - Joint mobility   - Strength   - Sensibility   - " Pain   - Wound   - Scar    Outcome Measures:  Quick DASH:           TODAY'S TREATMENT    Ultrasound:  - 3.3Mhz x 1.0Wcms to all wrist surgical sites to increase scar tissue extensibility x 8 min    Manual:  - PROM all digits PIP and DIPJs x 10 min  - retrograde massage and MEM to dorsal/volar forearm/wrist/hand  - STM around surgical sites to breakdown adhesions and tissue tension  - scar massage to all surgical sites to reduce underlying adhesion sand improve tendon gliding    Therapeutic ex:  - full composite fist flexion and extension in neutral x 25rps  - partial wrist ext/flex with full finger flexion x 25 rps  - partial thumb extension/flexion x 25 rps  - full arc tenodesis AROM x 25 rps    EDUCATION: Risk/benefits discussed, Home exercise program, orthosis wear schedule/ purpose/ precautions, care of orthosis, wound care, edema control, activity modifications, joint protection, pain/symptom management, injury pathology, and plan of care,        Orthosis purpose:  - protect  - position  - immobilize    Wear Schedule:  - At all times   - may remove for bathing/dressing/hygiene   - may not remove for HEP   - during activity/work   - while sleeping    Assessment:   Tolerated all manual intervention well today.  Correctly performing the above exercises using adequate rest for active recovery.  Exhibits good finger extension at MCP level however exhibits weaker thumb extension although weakness is anticipated still being 5 weeks post op.      Level of Complexity  MOD complexity    OT Rehab Potential  Excellent      Plan:  Planned Interventions include: therapeutic exercise, self-care home management, manual therapy, therapeutic activities, , neuromuscular coordination, vasopneumatic, dry needling, and orthosis fabrication.  Frequency: 2 x Week  Duration: 12 Weeks  Goals: Set and discussed today      Goals - Patient will:       Goal 1) verbalize/demonstrate/understanding of diagnosis and precautions       Goal 2)  verbalize purpose of orthosis, wearing schedule, care and precautions       Goal 3) don/doff orthosis correctly and independently       Goal 4) verbalize/demonstrate home program, activity modification and/or adaptive equipment    All goals set today and have been met.    Plan of care was developed with input and agreement by the patient      Katelynn Chamberlain OT

## 2024-11-21 ENCOUNTER — APPOINTMENT (OUTPATIENT)
Dept: CARDIOLOGY | Facility: HOSPITAL | Age: 54
End: 2024-11-21
Payer: COMMERCIAL

## 2024-11-21 ENCOUNTER — APPOINTMENT (OUTPATIENT)
Dept: RADIOLOGY | Facility: HOSPITAL | Age: 54
End: 2024-11-21
Payer: COMMERCIAL

## 2024-11-21 ENCOUNTER — HOSPITAL ENCOUNTER (EMERGENCY)
Facility: HOSPITAL | Age: 54
Discharge: HOME | End: 2024-11-21
Attending: STUDENT IN AN ORGANIZED HEALTH CARE EDUCATION/TRAINING PROGRAM
Payer: COMMERCIAL

## 2024-11-21 VITALS
TEMPERATURE: 98.2 F | WEIGHT: 293 LBS | RESPIRATION RATE: 18 BRPM | HEART RATE: 79 BPM | OXYGEN SATURATION: 100 % | SYSTOLIC BLOOD PRESSURE: 139 MMHG | DIASTOLIC BLOOD PRESSURE: 64 MMHG | BODY MASS INDEX: 50.02 KG/M2 | HEIGHT: 64 IN

## 2024-11-21 DIAGNOSIS — K21.9 CHEST PAIN DUE TO GERD: ICD-10-CM

## 2024-11-21 DIAGNOSIS — R07.9 ACUTE CHEST PAIN: Primary | ICD-10-CM

## 2024-11-21 DIAGNOSIS — E11.9 TYPE 2 DIABETES MELLITUS WITHOUT COMPLICATION, WITHOUT LONG-TERM CURRENT USE OF INSULIN (MULTI): ICD-10-CM

## 2024-11-21 DIAGNOSIS — R07.9 CHEST PAIN DUE TO GERD: ICD-10-CM

## 2024-11-21 DIAGNOSIS — F45.21 ILLNESS ANXIETY DISORDER: ICD-10-CM

## 2024-11-21 DIAGNOSIS — R42 VERTIGO: ICD-10-CM

## 2024-11-21 LAB
ALBUMIN SERPL BCP-MCNC: 4 G/DL (ref 3.4–5)
ALP SERPL-CCNC: 82 U/L (ref 33–110)
ALT SERPL W P-5'-P-CCNC: 31 U/L (ref 7–45)
ANION GAP SERPL CALCULATED.3IONS-SCNC: 12 MMOL/L (ref 10–20)
AST SERPL W P-5'-P-CCNC: 19 U/L (ref 9–39)
BASOPHILS # BLD AUTO: 0.02 X10*3/UL (ref 0–0.1)
BASOPHILS NFR BLD AUTO: 0.3 %
BILIRUB SERPL-MCNC: 0.4 MG/DL (ref 0–1.2)
BUN SERPL-MCNC: 10 MG/DL (ref 6–23)
CALCIUM SERPL-MCNC: 9.2 MG/DL (ref 8.6–10.3)
CARDIAC TROPONIN I PNL SERPL HS: 3 NG/L (ref 0–13)
CARDIAC TROPONIN I PNL SERPL HS: 4 NG/L (ref 0–13)
CHLORIDE SERPL-SCNC: 103 MMOL/L (ref 98–107)
CO2 SERPL-SCNC: 27 MMOL/L (ref 21–32)
CREAT SERPL-MCNC: 0.68 MG/DL (ref 0.5–1.05)
EGFRCR SERPLBLD CKD-EPI 2021: >90 ML/MIN/1.73M*2
EOSINOPHIL # BLD AUTO: 0.14 X10*3/UL (ref 0–0.7)
EOSINOPHIL NFR BLD AUTO: 2.2 %
ERYTHROCYTE [DISTWIDTH] IN BLOOD BY AUTOMATED COUNT: 16 % (ref 11.5–14.5)
GLUCOSE SERPL-MCNC: 131 MG/DL (ref 74–99)
HCT VFR BLD AUTO: 39.4 % (ref 36–46)
HGB BLD-MCNC: 12.6 G/DL (ref 12–16)
IMM GRANULOCYTES # BLD AUTO: 0.01 X10*3/UL (ref 0–0.7)
IMM GRANULOCYTES NFR BLD AUTO: 0.2 % (ref 0–0.9)
LYMPHOCYTES # BLD AUTO: 2.34 X10*3/UL (ref 1.2–4.8)
LYMPHOCYTES NFR BLD AUTO: 36.8 %
MAGNESIUM SERPL-MCNC: 1.74 MG/DL (ref 1.6–2.4)
MCH RBC QN AUTO: 26 PG (ref 26–34)
MCHC RBC AUTO-ENTMCNC: 32 G/DL (ref 32–36)
MCV RBC AUTO: 81 FL (ref 80–100)
MONOCYTES # BLD AUTO: 0.3 X10*3/UL (ref 0.1–1)
MONOCYTES NFR BLD AUTO: 4.7 %
NEUTROPHILS # BLD AUTO: 3.55 X10*3/UL (ref 1.2–7.7)
NEUTROPHILS NFR BLD AUTO: 55.8 %
NRBC BLD-RTO: 0 /100 WBCS (ref 0–0)
PLATELET # BLD AUTO: 208 X10*3/UL (ref 150–450)
POTASSIUM SERPL-SCNC: 4.2 MMOL/L (ref 3.5–5.3)
PROT SERPL-MCNC: 6.9 G/DL (ref 6.4–8.2)
RBC # BLD AUTO: 4.84 X10*6/UL (ref 4–5.2)
SODIUM SERPL-SCNC: 138 MMOL/L (ref 136–145)
WBC # BLD AUTO: 6.4 X10*3/UL (ref 4.4–11.3)

## 2024-11-21 PROCEDURE — 2500000002 HC RX 250 W HCPCS SELF ADMINISTERED DRUGS (ALT 637 FOR MEDICARE OP, ALT 636 FOR OP/ED): Performed by: STUDENT IN AN ORGANIZED HEALTH CARE EDUCATION/TRAINING PROGRAM

## 2024-11-21 PROCEDURE — 2500000005 HC RX 250 GENERAL PHARMACY W/O HCPCS: Performed by: STUDENT IN AN ORGANIZED HEALTH CARE EDUCATION/TRAINING PROGRAM

## 2024-11-21 PROCEDURE — 2500000001 HC RX 250 WO HCPCS SELF ADMINISTERED DRUGS (ALT 637 FOR MEDICARE OP): Performed by: STUDENT IN AN ORGANIZED HEALTH CARE EDUCATION/TRAINING PROGRAM

## 2024-11-21 PROCEDURE — 83735 ASSAY OF MAGNESIUM: CPT | Performed by: STUDENT IN AN ORGANIZED HEALTH CARE EDUCATION/TRAINING PROGRAM

## 2024-11-21 PROCEDURE — 84484 ASSAY OF TROPONIN QUANT: CPT | Performed by: STUDENT IN AN ORGANIZED HEALTH CARE EDUCATION/TRAINING PROGRAM

## 2024-11-21 PROCEDURE — 80053 COMPREHEN METABOLIC PANEL: CPT | Performed by: STUDENT IN AN ORGANIZED HEALTH CARE EDUCATION/TRAINING PROGRAM

## 2024-11-21 PROCEDURE — 36415 COLL VENOUS BLD VENIPUNCTURE: CPT | Performed by: STUDENT IN AN ORGANIZED HEALTH CARE EDUCATION/TRAINING PROGRAM

## 2024-11-21 PROCEDURE — 71045 X-RAY EXAM CHEST 1 VIEW: CPT | Performed by: RADIOLOGY

## 2024-11-21 PROCEDURE — 71045 X-RAY EXAM CHEST 1 VIEW: CPT

## 2024-11-21 PROCEDURE — 85025 COMPLETE CBC W/AUTO DIFF WBC: CPT | Performed by: STUDENT IN AN ORGANIZED HEALTH CARE EDUCATION/TRAINING PROGRAM

## 2024-11-21 PROCEDURE — 99283 EMERGENCY DEPT VISIT LOW MDM: CPT

## 2024-11-21 PROCEDURE — 93005 ELECTROCARDIOGRAM TRACING: CPT

## 2024-11-21 RX ORDER — LIDOCAINE HYDROCHLORIDE 20 MG/ML
15 SOLUTION OROPHARYNGEAL ONCE
Status: COMPLETED | OUTPATIENT
Start: 2024-11-21 | End: 2024-11-21

## 2024-11-21 RX ORDER — ALUMINUM HYDROXIDE, MAGNESIUM HYDROXIDE, AND SIMETHICONE 1200; 120; 1200 MG/30ML; MG/30ML; MG/30ML
30 SUSPENSION ORAL ONCE
Status: COMPLETED | OUTPATIENT
Start: 2024-11-21 | End: 2024-11-21

## 2024-11-21 RX ORDER — FAMOTIDINE 20 MG/1
20 TABLET, FILM COATED ORAL ONCE
Status: COMPLETED | OUTPATIENT
Start: 2024-11-21 | End: 2024-11-21

## 2024-11-21 RX ORDER — MECLIZINE HCL 25MG 25 MG/1
25 TABLET, CHEWABLE ORAL ONCE
Status: COMPLETED | OUTPATIENT
Start: 2024-11-21 | End: 2024-11-21

## 2024-11-21 RX ORDER — FAMOTIDINE 20 MG/1
20 TABLET, FILM COATED ORAL 2 TIMES DAILY
Qty: 30 TABLET | Refills: 0 | Status: SHIPPED | OUTPATIENT
Start: 2024-11-21 | End: 2024-12-06

## 2024-11-21 ASSESSMENT — HEART SCORE
HEART SCORE: 3
TROPONIN: LESS THAN OR EQUAL TO NORMAL LIMIT
AGE: 45-64
HISTORY: SLIGHTLY SUSPICIOUS
ECG: NORMAL
RISK FACTORS: >2 RISK FACTORS OR HX OF ATHEROSCLEROTIC DISEASE

## 2024-11-21 ASSESSMENT — PAIN SCALES - GENERAL
PAINLEVEL_OUTOF10: 0 - NO PAIN
PAINLEVEL_OUTOF10: 5 - MODERATE PAIN

## 2024-11-21 ASSESSMENT — COLUMBIA-SUICIDE SEVERITY RATING SCALE - C-SSRS
2. HAVE YOU ACTUALLY HAD ANY THOUGHTS OF KILLING YOURSELF?: NO
6. HAVE YOU EVER DONE ANYTHING, STARTED TO DO ANYTHING, OR PREPARED TO DO ANYTHING TO END YOUR LIFE?: NO
1. IN THE PAST MONTH, HAVE YOU WISHED YOU WERE DEAD OR WISHED YOU COULD GO TO SLEEP AND NOT WAKE UP?: NO

## 2024-11-21 ASSESSMENT — PAIN - FUNCTIONAL ASSESSMENT: PAIN_FUNCTIONAL_ASSESSMENT: 0-10

## 2024-11-21 NOTE — ED PROVIDER NOTES
HPI   Chief Complaint   Patient presents with    Chest Pain     Pt states that she has been having frequent vertigo episodes. Pt states that today she feels worse and that she feels dizzy, out of it and like she was punched in the chest. She also states she feels sob       Presents ED for retrosternal chest pain that she woke up after a nap feeling she got punched in chest and then felt like her was hurting.  Notes the pain is constant with gradual improvement since arriving the ED, moderate in severity, nonradiating.  No alleviating factors and no aggravating factors to include exertional activity.  Did describe a sensation of acute shortness of breath at rest but no significant pleuritic chest pain or notable dyspnea on exertion at this time, experiencing any cough or hemoptysis.  Not experiencing any bilateral/unilateral leg swelling and notes no history of DVT, denies any significant red flags/history for DVT/PE.  States he has been dealing with vertigo for the last month and has felt infrequently dizzy with postural changes and head movements but no significant symptoms today.  States she just felt unwell and not right, described as a fogginess in her brain.  Denies any headache, vision changes, dysarthria, or neuro dysfunction of her extremities.  Overall states she is talking/thinking/walking appropriately.  Denies any recent falls or head strikes.  Is not appreciating infectious symptoms include fever/chills.  Notes she was the hospital about 5 weeks ago for left forearm injury.  Denies any significant immediate family cardiac history before the age of 50 and she denies any personal cardiac history.  Notes history of DM but has not been monitoring her BG.  Former tobacco use and social EtOH use, denies any other significant substance use.              Patient History   Past Medical History:   Diagnosis Date    Abnormal Pap smear of cervix     Anxiety     Colon polyps     Depression     Diabetes (Multi)      High cholesterol     Hypertension     Obesity     Sleep apnea     Urinary tract infection     Varicella      Past Surgical History:   Procedure Laterality Date    APPENDECTOMY      BREAST BIOPSY Right     2018 right stereo bx benign through ccl     SECTION, CLASSIC       Section     SECTION, CLASSIC       SECTION, CLASSIC      baby passed away    CONDYLOMA EXCISION/FULGURATION      OTHER SURGICAL HISTORY  10/23/2014    Bunion Correction By Iselin Procedure     Family History   Problem Relation Name Age of Onset    Dementia Mother Fely     Coronary artery disease Mother Fely     Heart attack Mother Fely     Other (Uterine leiomyoma) Mother Fely     Arthritis Mother Fely     Depression Mother Fely     Hearing loss Mother Fely     Prostate cancer Father Chato         in remission heart vave replacment in 2023    Atrial fibrillation Father Chato     Anxiety disorder Brother      Depression Brother      Migraines Daughter      Other (bruises easily) Daughter       Social History     Tobacco Use    Smoking status: Former     Current packs/day: 0.00     Average packs/day: 1 pack/day for 20.0 years (20.0 ttl pk-yrs)     Types: Cigarettes     Start date:      Quit date: 2018     Years since quittin.8     Passive exposure: Past    Smokeless tobacco: Never    Tobacco comments:     Quit 2018   Vaping Use    Vaping status: Never Used   Substance Use Topics    Alcohol use: Yes     Alcohol/week: 2.0 standard drinks of alcohol     Types: 2 Glasses of wine per week     Comment: Drink occasionally    Drug use: Never       Physical Exam   ED Triage Vitals [24 1135]   Temperature Heart Rate Respirations BP   36.8 °C (98.2 °F) 72 18 (!) 189/102      Pulse Ox Temp Source Heart Rate Source Patient Position   100 % Temporal Monitor Sitting      BP Location FiO2 (%)     Right arm --       Physical Exam      ED Course & MDM   ED Course  as of 11/21/24 1942   Thu Nov 21, 2024   1140 VS notable for significantly hypertensive on presentation in setting of neurosymptoms and cardiopulmonary complaint, remaining VSS [BC]   1207 I personally reviewed and interpreted the EKG @ 1144: NSR 70, normal axis/intervals and no appreciable ischemia, non-specific TW findings, non-specific TW changes, prior EKG on 5/10/2024 reviewed without any appreciable specific/identifiable changes, and TWI in leads III and flattening of TW throughout precordial leads [BC]   1321 XR chest 1 view  IMPRESSION:  1.  No evidence of acute cardiopulmonary process.    I have personally reviewed and interpreted the images, no evidence of acute cardiopulmonary pathologies, no clinical evidence of cardiomegaly, pulmonary vascular congestion or opacities, [place] sign, agree with radiology final read [BC]   1321 CBC and Auto Differential(!)  Unremarkable and noncontributory to Patient condition/symptoms [BC]   1322 Comprehensive metabolic panel(!)  Unremarkable and noncontributory to Patient condition/symptoms [BC]   1322 Magnesium  WNL [BC]   1345 Troponin I Series, High Sensitivity (0, 1 HR)  Stable/flat in the setting of cardiopulmonary symptoms, EKG unremarkable for ischemic changes, no concern for ACS/MI or PE [BC]   1420 On reassessment patient endorses mild to moderate proven symptoms post ED interventions, not endorsing any other significant worsening symptoms.  Tolerated p.o. without symptomatic episodes of nausea or emesis, VS improved, able to ambulate unassisted without any appreciable instability/difficulty. [BC]      ED Course User Index  [BC] Cipriano Mendenhall MD         Diagnoses as of 11/21/24 1942   Acute chest pain   Illness anxiety disorder   Type 2 diabetes mellitus without complication, without long-term current use of insulin (Multi)   Vertigo   Chest pain due to GERD                 No data recorded     Ericka Coma Scale Score: 15 (11/21/24 1133 : Courtney  CASEY Hogan) HEART Score: 3 (11/21/24 1312 : Cipriano Mendenhall MD)     NIH Stroke Scale: 0 (11/21/24 1210 : Cipriano Mendenhall MD)                   Medical Decision Making  Patient presented to the ED for chest pain with associated jaw pain and short duration of shortness of breath, chronic vertiginous symptoms with concerning PMHx of normal tobacco use, DM, obesity, HTN, HLD, anxiety.  I personally reviewed and interpreted VS, labs, images, and EKG which are as stated above in the ED course.    Assessment/evaluation multifactorial consistent with illness anxiety and likely mild panic attack complicated by infrequent vertiginous symptoms associated with recent BPPV diagnosis.  No concerning history, clinical evidence/work-up, or exam findings for the considered differentials of ischemic CVA, intracranial/intracerebral mass effect, spontaneous/nontraumatic ICH, significant electrolyte/metabolic derangement, ACS/MI, PTX, PE, focal/multifocal lobar PNA, TAD, Boerhaave syndrome/pneumomediastinum.  These conditions have been thoroughly evaluated and determined to be sufficiently unlikely to be the etiology of patient's presenting symptoms.    Scores: Heart 3 (1-2% MACE), NIHSS 0, and Well's 0 (low risk)      Prescribed Pepcid for symptomatic management.  After receiving an appropriate exam, clinical work-up, and necessary interventions/treatment, Patient is appropriate for discharge at this time due to no concerning symptoms or findings requiring hospitalization for stabilization or further interrogation/management and is appropriate for management of symptoms at home with recommended appropriate outpatient follow-up.  Patient was encouraged to ask any questions or for clarification of today's ED encounter.  Patient is agreeable to plan of care. Discussed with Patient today's results/findings and likely diagnosis, provided appropriate RTED precautions along with recommended follow-up with PCP and Physical Therapy  (Vestibular).      Per Chart Review: Nothing pertinent to this ED encounter.      Parts of this chart have been completed using voice-to-tect recognition software. Please excuse any errors of transcription that were missed for editing/correcting.    Problems Addressed:  Acute chest pain: acute illness or injury  Chest pain due to GERD: acute illness or injury  Vertigo: chronic illness or injury    Amount and/or Complexity of Data Reviewed  Labs: ordered. Decision-making details documented in ED Course.  Radiology: ordered and independent interpretation performed. Decision-making details documented in ED Course.  ECG/medicine tests: ordered and independent interpretation performed. Decision-making details documented in ED Course.        Procedure  Procedures     Cipriano Mendenhall MD  11/21/24 1942

## 2024-11-21 NOTE — DISCHARGE INSTRUCTIONS
Thank you for allowing myself and the team to take care of you during your emergency situation and addressing your health concerns.    You were evaluated for chest pain.     During your visit in the emergency department you were evaluated for your presenting symptoms.  Based on the extensive workup you received, your major adverse cardiac event (MACE)/heart attack is 1-2% or less, which means there is no complete diagnostic/workup taken completely eliminate an event from occurring.  This means there is a chance of an event involving your heart that cannot be completely excluded that can occur in 1 out of 100 people.  All efforts were made to identify the source of your symptoms and identify any life-threatening conditions.  These life-threatening conditions that were attempted to be identified and medically managed/treated include but not limited to a blood clot in the lungs, inflammation/fluid around your heart, air between your lungs and chest wall, lung infection/pneumonia, and air/fluid (infection) within the middle part of your chest.  Additionally, you may be experiencing symptoms that are non-life-threatening but are uncomfortable and disruptive to your everyday life.  All efforts were made to manage your symptoms while in the emergency department along with appropriate at home therapy for symptomatic management during your recovery. Please be aware that not all of the conditions explained/discussed in these discharge instructions are applicable to your condition but encompass many of the conditions that were evaluated for during your ED encounter.      You were evaluated for dizziness/lightheadedness.    During your visit in the emergency department you were evaluated for your presenting symptoms.  Based on the extensive workup you received,  all efforts were made to identify the source of your symptoms and identify any life-threatening conditions.  These life-threatening conditions that were attempted to be  identified and medically managed/treated include but not limited to abnormal rhythm of your heart, narrowing of vessels in your neck entering into your skull/brain, blood electrolyte imbalance, metabolic (blood sugar) imbalance, endocrine (hormone) imbalance, or dehydration.  Additionally, you may be experiencing symptoms that are non-life-threatening but are uncomfortable and disruptive to your everyday life.  All efforts were made to manage your symptoms while in the emergency department along with appropriate at home therapy for symptomatic management during your recovery. Please be aware that not all of the conditions explained/discussed in these discharge instructions are applicable to your condition but encompass many of the conditions that were evaluated for during your ED encounter.      During your evaluation and assessment, all measures were taken to evaluate you and address your health concerns to identify dangerous and life threatening health conditions. It is not possible to identify all health conditions or pathologies and eliminate any chance of unfavorable outcomes while in the Emergency Department. My team encourages you to be vigilant with your health and follow-up with the appropriate providers outlined in your discharge paperwork. Please return to the Emergency Department if you feel that your health has not improved or experiencing worsening symptoms.    Special instructions please take medication as prescribed.  Please make a follow-up with your primary care physician to further manage her symptoms and address your concerns.  A vestibular physical therapy referral was placed to further manage her symptoms.    Incidental findings:  none

## 2024-11-24 LAB
ATRIAL RATE: 70 BPM
P AXIS: 36 DEGREES
P OFFSET: 213 MS
P ONSET: 150 MS
PR INTERVAL: 142 MS
Q ONSET: 221 MS
QRS COUNT: 11 BEATS
QRS DURATION: 84 MS
QT INTERVAL: 408 MS
QTC CALCULATION(BAZETT): 440 MS
QTC FREDERICIA: 429 MS
R AXIS: 40 DEGREES
T AXIS: 20 DEGREES
T OFFSET: 425 MS
VENTRICULAR RATE: 70 BPM

## 2024-11-25 ENCOUNTER — TREATMENT (OUTPATIENT)
Dept: OCCUPATIONAL THERAPY | Facility: CLINIC | Age: 54
End: 2024-11-25
Payer: COMMERCIAL

## 2024-11-25 ENCOUNTER — APPOINTMENT (OUTPATIENT)
Dept: ORTHOPEDIC SURGERY | Facility: CLINIC | Age: 54
End: 2024-11-25
Payer: COMMERCIAL

## 2024-11-25 VITALS — WEIGHT: 293 LBS | HEIGHT: 64 IN | BODY MASS INDEX: 50.02 KG/M2

## 2024-11-25 DIAGNOSIS — S56.902D: Primary | ICD-10-CM

## 2024-11-25 DIAGNOSIS — S56.902A OPEN WOUND OF LEFT FOREARM WITH TENDON INVOLVEMENT, INITIAL ENCOUNTER: Primary | ICD-10-CM

## 2024-11-25 DIAGNOSIS — S51.802A OPEN WOUND OF LEFT FOREARM WITH TENDON INVOLVEMENT, INITIAL ENCOUNTER: Primary | ICD-10-CM

## 2024-11-25 PROCEDURE — 3061F NEG MICROALBUMINURIA REV: CPT | Performed by: ORTHOPAEDIC SURGERY

## 2024-11-25 PROCEDURE — 3048F LDL-C <100 MG/DL: CPT | Performed by: ORTHOPAEDIC SURGERY

## 2024-11-25 PROCEDURE — 97035 APP MDLTY 1+ULTRASOUND EA 15: CPT | Mod: GO | Performed by: OCCUPATIONAL THERAPIST

## 2024-11-25 PROCEDURE — 99024 POSTOP FOLLOW-UP VISIT: CPT | Performed by: ORTHOPAEDIC SURGERY

## 2024-11-25 PROCEDURE — 97112 NEUROMUSCULAR REEDUCATION: CPT | Mod: GO | Performed by: OCCUPATIONAL THERAPIST

## 2024-11-25 PROCEDURE — 1036F TOBACCO NON-USER: CPT | Performed by: ORTHOPAEDIC SURGERY

## 2024-11-25 PROCEDURE — 3044F HG A1C LEVEL LT 7.0%: CPT | Performed by: ORTHOPAEDIC SURGERY

## 2024-11-25 PROCEDURE — 97110 THERAPEUTIC EXERCISES: CPT | Mod: GO | Performed by: OCCUPATIONAL THERAPIST

## 2024-11-25 PROCEDURE — 3008F BODY MASS INDEX DOCD: CPT | Performed by: ORTHOPAEDIC SURGERY

## 2024-11-25 ASSESSMENT — PAIN DESCRIPTION - DESCRIPTORS: DESCRIPTORS: ACHING;SORE

## 2024-11-25 ASSESSMENT — PAIN SCALES - GENERAL: PAINLEVEL_OUTOF10: 5 - MODERATE PAIN

## 2024-11-25 ASSESSMENT — PAIN - FUNCTIONAL ASSESSMENT: PAIN_FUNCTIONAL_ASSESSMENT: 0-10

## 2024-11-25 NOTE — PROGRESS NOTES
"  Occupational Therapy Orthopedic Treatment note    Patient Name: Vilma Grover  MRN: [id number]  Today's Date: 11/25/2024  Time Calculation  Start Time: 0945  Stop Time: 1025  Time Calculation (min): 40 min   , OT Therapeutic Procedures Time Entry  Neuromuscular Re-Education Time Entry: 13  Therapeutic Exercise Time Entry: 17, OT Modalities Time Entry  Ultrasound Time Entry: 8    Insurance:  Visit number: 7 of 22  Authorization info:  none required    Current Problem  No diagnosis found.      Referred by:  Dr. Gregorio  Reason for visit:     Subjective   \"The exercises make me sore but nothing more than that. Im a little frustrated with the lack of wrist motion but the surgeon said that would happen\"  RICKY: Dog bite  DOI: 6/2/24  DOS: 10/16/24    FCR to EDC  RF FDS to EPL  Walk in Patient? Yes  Work Related Injury? No    Hand dominance: Right  Effected extremity: Left    Medical History Form: Reviewed (scanned into chart)  Prior Level of Function (PLOF)  Patient previously mod I with all ADLs/IADLs    ADL/IADL Deficits:  Bathing, Dressing, Hygiene/Grooming, Hair care, Sleep, Home mgt, Meal prep, and Work      Precautions:   NWB  Tendon transfer protocol      Pain:   1/10  Location: L radial wrist surgical site and 1sst web space  Description: pulling and throbbing  Aggravating Factors:  n/a  Relieving Factors:  Rest and Elevation      Patients Living Environment: Reviewed and no concern  Lives with: Alone  Primary Language: English  Patient's Goal(s) for Therapy: \"to get movement back into my hand\"      Objective     LEFT HAND   (ext/flex)   MCP PIP DIP DPC   IF     2.5   MF    2.5   RF    3   SF    1   Thumb       Thumb RABD       Thumb PABD         WRIST AROM (Degrees)   R L   Extension  45 A   Flexion  10 A         R hand figure 8:  44cm  L hand figure 8:  41.1cm      Edema:  -  min    Sensation:  - intact light touch  - hypersensitivity  - paraesthesias    Performance Impacted:   - ROM   - Joint mobility   - " Strength   - Sensibility   - Pain   - Wound   - Scar    Outcome Measures:  Quick DASH:           TODAY'S TREATMENT    Ultrasound:  - 3.3Mhz x 1.0Wcms to all wrist surgical sites to increase scar tissue extensibility x 8 min    Neuro re-ed:  - AROM tendon gliding within fluidotherapy for desensitization of all surgical sites and re-ed of composite fist proprioception x 13 min    Therapeutic ex:  - full composite fist flexion and extension in neutral x 25rps  - partial wrist ext/flex with full finger flexion x 25 rps  - partial thumb extension/flexion x 25 rps  - full arc tenodesis AROM x 25 rps  - AROM opposition to digital tip D2-4 x 25rps    EDUCATION: Risk/benefits discussed, Home exercise program, orthosis wear schedule/ purpose/ precautions, care of orthosis, wound care, edema control, activity modifications, joint protection, pain/symptom management, injury pathology, and plan of care,        Orthosis purpose:  - protect  - position  - immobilize    Wear Schedule:  - At all times   - may remove for bathing/dressing/hygiene   - may not remove for HEP   - during activity/work   - while sleeping    Assessment:   Significantly reduced stiffness following fluidotherapy intervention. Able to demo for the first time A/F to DPC   D2-4 1-3cm and ginger to extend without extensor lag. She is also able to oppose to her Rf Dip tip.      Level of Complexity  MOD complexity    OT Rehab Potential  Excellent      Plan:  Planned Interventions include: therapeutic exercise, self-care home management, manual therapy, therapeutic activities, , neuromuscular coordination, vasopneumatic, dry needling, and orthosis fabrication.  Frequency: 2 x Week  Duration: 12 Weeks  Goals: Set and discussed today      Goals - Patient will:       Goal 1) verbalize/demonstrate/understanding of diagnosis and precautions       Goal 2) verbalize purpose of orthosis, wearing schedule, care and precautions       Goal 3) don/doff orthosis correctly and  independently       Goal 4) verbalize/demonstrate home program, activity modification and/or adaptive equipment    All goals set today and have been met.    Plan of care was developed with input and agreement by the patient      Katelynn Chamberlain OT

## 2024-11-27 ENCOUNTER — APPOINTMENT (OUTPATIENT)
Dept: OCCUPATIONAL THERAPY | Facility: CLINIC | Age: 54
End: 2024-11-27
Payer: COMMERCIAL

## 2024-12-02 ENCOUNTER — APPOINTMENT (OUTPATIENT)
Dept: OCCUPATIONAL THERAPY | Facility: CLINIC | Age: 54
End: 2024-12-02
Payer: COMMERCIAL

## 2024-12-04 ENCOUNTER — TREATMENT (OUTPATIENT)
Dept: OCCUPATIONAL THERAPY | Facility: CLINIC | Age: 54
End: 2024-12-04
Payer: COMMERCIAL
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
## 2024-12-04 DIAGNOSIS — S59.911A RIGHT FOREARM INJURY: ICD-10-CM

## 2024-12-04 DIAGNOSIS — S56.902D: ICD-10-CM

## 2024-12-04 PROCEDURE — 97035 APP MDLTY 1+ULTRASOUND EA 15: CPT | Mod: GO | Performed by: OCCUPATIONAL THERAPIST

## 2024-12-04 PROCEDURE — 97112 NEUROMUSCULAR REEDUCATION: CPT | Mod: GO | Performed by: OCCUPATIONAL THERAPIST

## 2024-12-04 NOTE — PROGRESS NOTES
"  Occupational Therapy Orthopedic Treatment note    Patient Name: Vilma Grover  MRN: [id number]  Today's Date: 12/4/2024  Time Calculation  Start Time: 1015  Stop Time: 1057  Time Calculation (min): 42 min   , OT Therapeutic Procedures Time Entry  Neuromuscular Re-Education Time Entry: 23  Therapeutic Exercise Time Entry: 7, OT Modalities Time Entry  Ultrasound Time Entry: 8    Insurance:  Visit number: 8 of 22  Authorization info:  none required    Current Problem  1. Dog bite, subsequent encounter        2. Right forearm injury  Follow Up In Occupational Therapy      3. Injury of forearm muscle or tendon, left, subsequent encounter            Referred by:  Dr. Gregorio  Reason for visit:     Subjective   \"I think the hand is coming along Im pretty happy the direction its going\"  RICKY: Dog bite  DOI: 6/2/24  DOS: 10/16/24    FCR to EDC  RF FDS to EPL  Walk in Patient? Yes  Work Related Injury? No    Hand dominance: Right  Effected extremity: Left    Medical History Form: Reviewed (scanned into chart)  Prior Level of Function (PLOF)  Patient previously mod I with all ADLs/IADLs    ADL/IADL Deficits:  Bathing, Dressing, Hygiene/Grooming, Hair care, Sleep, Home mgt, Meal prep, and Work      Precautions:   NWB  Tendon transfer protocol      Pain:   1/10  Location: L radial wrist surgical site and 1sst web space  Description: pulling and throbbing  Aggravating Factors:  n/a  Relieving Factors:  Rest and Elevation      Patients Living Environment: Reviewed and no concern  Lives with: Alone  Primary Language: English  Patient's Goal(s) for Therapy: \"to get movement back into my hand\"      Objective     LEFT HAND   (ext/flex)   MCP PIP DIP DPC   IF     1.5   MF    1.5   RF    1.5   SF    1   Thumb       Thumb RABD       Thumb PABD         WRIST AROM (Degrees)   R L   Extension  45 A   Flexion  10 A       R hand figure 8:  44cm  L hand figure 8:  41.1cm      Edema:  -  min    Sensation:  - intact light touch  - " hypersensitivity  - paraesthesias    Performance Impacted:   - ROM   - Joint mobility   - Strength   - Sensibility   - Pain   - Wound   - Scar    Outcome Measures:  Quick DASH:           TODAY'S TREATMENT    Ultrasound:  - 3.3Mhz x 1.0Wcms to all wrist surgical sites to increase scar tissue extensibility x 8 min    Neuro re-ed:  - AROM tendon gliding within fluidotherapy for desensitization of all surgical sites and re-ed of composite fist proprioception x 13 min  - AROM crumble/spread paper one handed to re-ed dexterity and manipulation skills x 5 min  - ASL AROM to re-ed dexterity and manipulation skills x 5 min    Therapeutic ex:  - full composite fist flexion and extension in neutral x 25rps  - partial wrist ext/flex with full finger flexion x 25 rps  - partial thumb extension/flexion x 25 rps  - full arc tenodesis AROM x 25 rps      EDUCATION: Risk/benefits discussed, Home exercise program, orthosis wear schedule/ purpose/ precautions, care of orthosis, wound care, edema control, activity modifications, joint protection, pain/symptom management, injury pathology, and plan of care,        Orthosis purpose:  - protect  - position  - immobilize    Wear Schedule:  - At all times   - may remove for bathing/dressing/hygiene   - may not remove for HEP   - during activity/work   - while sleeping    Assessment:   Pt is 7 weeks post -op today. Demonstrates ability to oppose to her RF DIP tip. A/F to DPC D2-5 all about 1.5cm.  Exhibited moderate difficulty with manipulation ex today due to stiffness and incoordination although was able to complete manipulation with MIN compensatory techniques to crumble paper.      Level of Complexity  MOD complexity    OT Rehab Potential  Excellent      Plan:  Planned Interventions include: therapeutic exercise, self-care home management, manual therapy, therapeutic activities, , neuromuscular coordination, vasopneumatic, dry needling, and orthosis fabrication.  Frequency: 2 x  Week  Duration: 12 Weeks  Goals: Set and discussed today      Goals - Patient will:       Goal 1) verbalize/demonstrate/understanding of diagnosis and precautions       Goal 2) verbalize purpose of orthosis, wearing schedule, care and precautions       Goal 3) don/doff orthosis correctly and independently       Goal 4) verbalize/demonstrate home program, activity modification and/or adaptive equipment    All goals set today and have been met.    Plan of care was developed with input and agreement by the patient      Katelynn Chamberlain OT

## 2024-12-09 ENCOUNTER — TREATMENT (OUTPATIENT)
Dept: OCCUPATIONAL THERAPY | Facility: CLINIC | Age: 54
End: 2024-12-09
Payer: COMMERCIAL
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
## 2024-12-09 DIAGNOSIS — S56.902D: Primary | ICD-10-CM

## 2024-12-09 DIAGNOSIS — S59.911A RIGHT FOREARM INJURY: ICD-10-CM

## 2024-12-09 PROCEDURE — 97110 THERAPEUTIC EXERCISES: CPT | Mod: GO | Performed by: OCCUPATIONAL THERAPIST

## 2024-12-09 PROCEDURE — 97112 NEUROMUSCULAR REEDUCATION: CPT | Mod: GO | Performed by: OCCUPATIONAL THERAPIST

## 2024-12-09 NOTE — PROGRESS NOTES
"  Occupational Therapy Orthopedic Treatment note    Patient Name: Vilma Grover  MRN: [id number]  Today's Date: 12/9/2024  Time Calculation  Start Time: 0400  Stop Time: 0438  Time Calculation (min): 38 min   , OT Therapeutic Procedures Time Entry  Neuromuscular Re-Education Time Entry: 18  Therapeutic Exercise Time Entry: 18,      Insurance:  Visit number: 9 of 22  Authorization info:  none required    Current Problem  1. Injury of forearm muscle or tendon, left, subsequent encounter        2. Right forearm injury  Follow Up In Occupational Therapy      3. Dog bite, subsequent encounter            Referred by:  Dr. Gregorio    Subjective   Pt is about 8 weeks post op today    RICKY: Dog bite  DOI: 6/2/24  DOS: 10/16/24    FCR to EDC  RF FDS to EPL  Walk in Patient? Yes  Work Related Injury? No    Hand dominance: Right  Effected extremity: Left    Medical History Form: Reviewed (scanned into chart)  Prior Level of Function (PLOF)  Patient previously mod I with all ADLs/IADLs    ADL/IADL Deficits:  Bathing, Dressing, Hygiene/Grooming, Hair care, Sleep, Home mgt, Meal prep, and Work      Precautions:   NWB  Tendon transfer protocol      Pain:   1/10  Location: L radial wrist surgical site and 1sst web space  Description: pulling and throbbing  Aggravating Factors:  n/a  Relieving Factors:  Rest and Elevation      Patients Living Environment: Reviewed and no concern  Lives with: Alone  Primary Language: English  Patient's Goal(s) for Therapy: \"to get movement back into my hand\"      Objective     LEFT HAND   (ext/flex)   MCP PIP DIP DPC   IF     1.5   MF    1.5   RF    1.5   SF    1   Thumb       Thumb RABD       Thumb PABD         WRIST AROM (Degrees)   R L   Extension  45 A   Flexion  10 A       R hand figure 8:  44cm  L hand figure 8:  41.1cm      Edema:  -  min    Sensation:  - intact light touch  - hypersensitivity  - paraesthesias    Performance Impacted:   - ROM   - Joint mobility   - Strength   - Sensibility   - " Pain   - Wound   - Scar    Outcome Measures:  Quick DASH:           TODAY'S TREATMENT        Neuro re-ed:  - AROM tendon gliding within fluidotherapy for desensitization of all surgical sites and re-ed of composite fist proprioception x 13 min  - ASL AROM to re-ed dexterity and manipulation skills x 5 min    Therapeutic ex:  - full composite fist flexion and extension in neutral x 25rps  - wrist ext/flex with full finger flexion x 25 rps  - thumb extension/flexion and opposition x 25 rps  - full arc tenodesis AROM x 25 rps  - red flexbar sup/pron to increase  and wrist strength 3x10      EDUCATION: Risk/benefits discussed, Home exercise program, orthosis wear schedule/ purpose/ precautions, care of orthosis, wound care, edema control, activity modifications, joint protection, pain/symptom management, injury pathology, and plan of care,        Orthosis purpose:  - protect  - position  - immobilize    Wear Schedule:  - At all times   - may remove for bathing/dressing/hygiene   - may not remove for HEP   - during activity/work   - while sleeping    Assessment:   Pt is 8 weeks post - op today.  Largest upgrade was given today to begin weaning out of the static orthosis during the day time but to still wear at ngiht to bed.  This would allow greater AROM throughout the day and contribute to improved gross grasping.  Following flexbar exercises she was able to oppose to her SF tip for the first time.      Level of Complexity  MOD complexity    OT Rehab Potential  Excellent      Plan:  Planned Interventions include: therapeutic exercise, self-care home management, manual therapy, therapeutic activities, , neuromuscular coordination, vasopneumatic, dry needling, and orthosis fabrication.  Frequency: 2 x Week  Duration: 12 Weeks  Goals: Set and discussed today      Goals - Patient will:       Goal 1) verbalize/demonstrate/understanding of diagnosis and precautions       Goal 2) verbalize purpose of orthosis, wearing  schedule, care and precautions       Goal 3) don/doff orthosis correctly and independently       Goal 4) verbalize/demonstrate home program, activity modification and/or adaptive equipment    All goals set today and have been met.    Plan of care was developed with input and agreement by the patient      Katelynn Chamberlain OT

## 2024-12-11 ENCOUNTER — TREATMENT (OUTPATIENT)
Dept: OCCUPATIONAL THERAPY | Facility: CLINIC | Age: 54
End: 2024-12-11
Payer: COMMERCIAL
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
## 2024-12-11 DIAGNOSIS — S59.911A RIGHT FOREARM INJURY: ICD-10-CM

## 2024-12-11 DIAGNOSIS — S56.902D: ICD-10-CM

## 2024-12-11 PROCEDURE — 97112 NEUROMUSCULAR REEDUCATION: CPT | Mod: GO | Performed by: OCCUPATIONAL THERAPIST

## 2024-12-11 PROCEDURE — 97110 THERAPEUTIC EXERCISES: CPT | Mod: GO | Performed by: OCCUPATIONAL THERAPIST

## 2024-12-11 NOTE — PROGRESS NOTES
"  Occupational Therapy Orthopedic Treatment note    Patient Name: Vilma Grover  MRN: [id number]  Today's Date: 12/11/2024  Time Calculation  Start Time: 0130  Stop Time: 0210  Time Calculation (min): 40 min   , OT Therapeutic Procedures Time Entry  Neuromuscular Re-Education Time Entry: 13  Therapeutic Exercise Time Entry: 26,      Insurance:  Visit number: 10 of 22  Authorization info:  none required    Current Problem  1. Dog bite, subsequent encounter        2. Right forearm injury  Follow Up In Occupational Therapy      3. Injury of forearm muscle or tendon, left, subsequent encounter            Referred by:  Dr. Gregorio    Subjective   \"It feels so good to be out of the splitn during the day.  I am enjoying typing, tying shoes, doing my hair again\"    RICKY: Dog bite  DOI: 6/2/24  DOS: 10/16/24    FCR to EDC  RF FDS to EPL  Walk in Patient? Yes  Work Related Injury? No    Hand dominance: Right  Effected extremity: Left    Medical History Form: Reviewed (scanned into chart)  Prior Level of Function (PLOF)  Patient previously mod I with all ADLs/IADLs    ADL/IADL Deficits:  Bathing, Dressing, Hygiene/Grooming, Hair care, Sleep, Home mgt, Meal prep, and Work      Precautions:   NWB  Tendon transfer protocol      Pain:   1/10  Location: L radial wrist surgical site and 1sst web space  Description: pulling and throbbing  Aggravating Factors:  n/a  Relieving Factors:  Rest and Elevation      Patients Living Environment: Reviewed and no concern  Lives with: Alone  Primary Language: English  Patient's Goal(s) for Therapy: \"to get movement back into my hand\"      Objective     LEFT HAND   (ext/flex)   MCP PIP DIP DPC   IF     1.5   MF    1.5   RF    1.5   SF    1   Thumb       Thumb RABD       Thumb PABD         WRIST AROM (Degrees)   R L   Extension  45 A   Flexion  10 A       R hand figure 8:  44cm  L hand figure 8:  41.1cm      Edema:  -  min    Sensation:  - intact light touch  - hypersensitivity  - " paraesthesias    Performance Impacted:   - ROM   - Joint mobility   - Strength   - Sensibility   - Pain   - Wound   - Scar    Outcome Measures:  Quick DASH:             TODAY'S TREATMENT    Neuro re-ed:  - AROM tendon gliding within fluidotherapy for desensitization of all surgical sites and re-ed of composite fist proprioception x 13 min  - ASL AROM to re-ed dexterity and manipulation skills x 5 min    Therapeutic ex:  - full composite fist flexion and extension in neutral x 25rps  - wrist ext/flex with full finger flexion x 25 rps  - red, grn, blue clothespins 3pt and lateral pinch for strength   - red flexbar sup/pron to increase  and wrist strength 3x10  - isolated EDC to increase IPJ ROM x 50 rps      EDUCATION: Risk/benefits discussed, Home exercise program, orthosis wear schedule/ purpose/ precautions, care of orthosis, wound care, edema control, activity modifications, joint protection, pain/symptom management, injury pathology, and plan of care,        Orthosis purpose:  - protect  - position  - immobilize    Wear Schedule:  - At all times   - may remove for bathing/dressing/hygiene   - may not remove for HEP   - during activity/work   - while sleeping    Assessment:   Continues to tolerate resistive ex quite well as well as dexterity exercises.  She is able to complete isolated EDC with immense focus although is quite fatigued after this ex.  She understands to continue this at home.      Level of Complexity  MOD complexity    OT Rehab Potential  Excellent      Plan:  Planned Interventions include: therapeutic exercise, self-care home management, manual therapy, therapeutic activities, , neuromuscular coordination, vasopneumatic, dry needling, and orthosis fabrication.  Frequency: 2 x Week  Duration: 12 Weeks  Goals: Set and discussed today      Goals - Patient will:       Goal 1) verbalize/demonstrate/understanding of diagnosis and precautions       Goal 2) verbalize purpose of orthosis, wearing  schedule, care and precautions       Goal 3) don/doff orthosis correctly and independently       Goal 4) verbalize/demonstrate home program, activity modification and/or adaptive equipment    All goals set today and have been met.    Plan of care was developed with input and agreement by the patient      Katelynn Chamberlain OT

## 2024-12-13 ENCOUNTER — CLINICAL SUPPORT (OUTPATIENT)
Dept: PHYSICAL THERAPY | Facility: CLINIC | Age: 54
End: 2024-12-13
Payer: COMMERCIAL

## 2024-12-13 DIAGNOSIS — H81.13 BENIGN PAROXYSMAL POSITIONAL VERTIGO DUE TO BILATERAL VESTIBULAR DISORDER: Primary | ICD-10-CM

## 2024-12-13 DIAGNOSIS — R42 VERTIGO: ICD-10-CM

## 2024-12-13 PROCEDURE — 95992 CANALITH REPOSITIONING PROC: CPT | Mod: GP | Performed by: PHYSICAL THERAPIST

## 2024-12-13 PROCEDURE — 97161 PT EVAL LOW COMPLEX 20 MIN: CPT | Mod: GP | Performed by: PHYSICAL THERAPIST

## 2024-12-13 SDOH — ECONOMIC STABILITY: GENERAL: QUALITY OF LIFE: GOOD

## 2024-12-13 ASSESSMENT — ENCOUNTER SYMPTOMS: NO PATIENT REPORTED PAIN: 1

## 2024-12-13 ASSESSMENT — ACTIVITIES OF DAILY LIVING (ADL): POOR_BALANCE: 1

## 2024-12-13 NOTE — PROGRESS NOTES
Physical Therapy Evaluation and Treatment     Patient Name: Vilma Grover  MRN: [id number]  Encounter date: 12/13/2024  Time Calculation  Start Time: 0850  Stop Time: 0930  Time Calculation (min): 40 min  PT Evaluation Time Entry  PT Evaluation (Low) Time Entry: 25  PT Modalities Time Entry  Canalith Repositioning Time Entry: 15  Low complexity due to patient's clinical presentation being stable and uncomplicated by any significant comorbidities that may affect rehab tolerance and progression.     Visit # 1 of 7  Visits/Dates Authorized: [employer] - NPN / 100% COVERAGE thru 12/31/24 / $3000 OOP MET / $20 COPAY waived thru 12/31/24, reverify 1/1/25 / 24V/max - 2 used   Insurance Type: Payor: GENERIC COMMERCIAL / Plan: GENERIC COMMERCIAL / Product Type: *No Product type* /     Current Problem:   Problem List Items Addressed This Visit             ICD-10-CM    Benign paroxysmal positional vertigo - Primary H81.10    Relevant Orders    Follow Up In Physical Therapy    Vertigo R42    Relevant Orders    Follow Up In Physical Therapy     Precautions:  Precautions  Precautions Comment: Vestibular       Subjective    Subjective Evaluation    History of Present Illness  Mechanism of injury: Pt presents back to therapy with dizziness, she was previously seen in January for BPPV. She reports this time around the symptoms lasted for a month. She reports that it doesn't seem to happen when she is lying down but once she lays down and closes her eyes it seems to create the spin. She also feels pressure in the back of her head. She reports this time around it has been a month ago. She reports that she went to urgent care who did the epley maneuver which helped a little bit. She also went to the hospital at one point because she was in the kitchen and started to get extremely dizzy and nearly fell.     Quality of life: good    Pain  No pain reported           Objective      Objective    Neuro Oculomotor:  Range of Motion:  "Normal  Smooth Pursuit: Normal  Horizontal Saccades: Normal  Vertical Saccades: Other: (comment) (reduced velocity noted; diagonal saccades - pt felt off but no abnormal findings)  Eye Alignment : Normal  Distraction Cover: Normal  Distraction Uncover: Normal  Convergence: Normal  Neuro Oculomotor Comment:  (Pt reports eyes feel tired when doing testing)  Neuro Vestibular:  Horizontal VOR: Other: (comment) (difficulty with gaze stabilization, \"odd sensation\" but not dizziness; diagonal up L down R was again difficulty with \"odd sensation\" and eye fatigue however not as bad R to L)  Vertical VOR: Negative  R head thrust: Negative  L head thrust: Negative  Spontaneous Nystagmus: Absent  Gaze Evoked Nystagmus: Absent  Positional:  Bowlegs-Halpike Right: Other(comment): (WIll test at later date.)  Elyssa-Halpike Left: positive    Outcome Measures:  Other Measures  Dizziness Handicap Inventory: 40     Treatments:  Balance/Neuromuscular Re-Education  Balance/Neuromuscular Re-Education Activity Performed: Yes  Balance/Neuromuscular Re-Education Activity 1: L epley x1    HEP / Access Codes: Will provide at later date    Assessment   Assessment & Plan     Assessment  Impairments: impaired balance  Assessment details: Pt is a 54 y.o female presenting to therapy with dizziness. Pt found to have positive finding of L BPPV, unable to test R due to high response with L epley maneuver this date. Also noted overall sensitivity during neuro-ocular testing however this may be due to BPPV or general hypofunction in the system further testing will be required throughout care. At this time pt would benefit from skilled physical therapy in order to prevent further functional decline and reduce falls.    Prognosis: good    Plan  Therapy options: will be seen for skilled physical therapy services  Planned therapy interventions: abdominal trunk stabilization, manual therapy, balance/weight-bearing training, motor coordination training, " neuromuscular re-education, body mechanics training, postural training, fine motor coordination training, soft tissue mobilization, flexibility, strengthening, functional ROM exercises, gait training, stretching, therapeutic activities, home exercise program and transfer training  Other planned therapy interventions: CRP  Frequency: 1x week  Duration in visits: 6  Duration in weeks: 7  Treatment plan discussed with: patient           Goals:   Active       PT Problem       Pt will be 100% IND with HEP in 6 weeks in order to maintain progress with therapy.         Start:  12/13/24    Expected End:  02/21/25            Pt will report a reduction in dizziness score to 0/10 in 6 weeks in order to improve ambulation, transfers and self care tasks.        Start:  12/13/24    Expected End:  02/21/25            Pt will no longer test positive for BPPV and demonstrate ability to perform epley independent at home in 6 weeks.        Start:  12/13/24    Expected End:  02/21/25            Pt will demonstrate subjective improvement of ADLs and recreational activities through improved score of 0 on DHI in 6 weeks.        Start:  12/13/24    Expected End:  02/21/25

## 2024-12-16 ENCOUNTER — TREATMENT (OUTPATIENT)
Dept: OCCUPATIONAL THERAPY | Facility: CLINIC | Age: 54
End: 2024-12-16
Payer: COMMERCIAL

## 2024-12-16 DIAGNOSIS — S59.911A RIGHT FOREARM INJURY: ICD-10-CM

## 2024-12-16 DIAGNOSIS — S56.902D: ICD-10-CM
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
## 2024-12-16 PROCEDURE — 97110 THERAPEUTIC EXERCISES: CPT | Mod: GO | Performed by: OCCUPATIONAL THERAPIST

## 2024-12-16 PROCEDURE — 97112 NEUROMUSCULAR REEDUCATION: CPT | Mod: GO | Performed by: OCCUPATIONAL THERAPIST

## 2024-12-16 NOTE — PROGRESS NOTES
"  Occupational Therapy Orthopedic Treatment note and Re-eval    Patient Name: Vilma Grover  MRN: [id number]  Today's Date: 12/18/2024  Time Calculation  Start Time: 0430  Stop Time: 0510  Time Calculation (min): 40 min   , OT Therapeutic Procedures Time Entry  Neuromuscular Re-Education Time Entry: 13  Therapeutic Exercise Time Entry: 25,      Insurance:  Visit number: 11 of 22  Authorization info:  none required    Current Problem  1. Dog bite, subsequent encounter        2. Right forearm injury  Follow Up In Occupational Therapy      3. Injury of forearm muscle or tendon, left, subsequent encounter            Referred by:  Dr. Gregorio    Subjective   \"Im really nervous that I over did it this weekend helping my mom move into a new facility. My forearm seem so tight and hard\"    RICKY: Dog bite  DOI: 6/2/24  DOS: 10/16/24    FCR to EDC  RF FDS to EPL  Walk in Patient? Yes  Work Related Injury? No    Hand dominance: Right  Effected extremity: Left    Medical History Form: Reviewed (scanned into chart)  Prior Level of Function (PLOF)  Patient previously mod I with all ADLs/IADLs    ADL/IADL Deficits:  Bathing, Dressing, Hygiene/Grooming, Hair care, Sleep, Home mgt, Meal prep, and Work      Precautions:   NWB  Tendon transfer protocol      Pain:   1/10  Location: L radial wrist surgical site and 1sst web space  Description: pulling and throbbing  Aggravating Factors:  n/a  Relieving Factors:  Rest and Elevation      Patients Living Environment: Reviewed and no concern  Lives with: Alone  Primary Language: English  Patient's Goal(s) for Therapy: \"to get movement back into my hand\"      Objective     LEFT HAND   (ext/flex)   MCP PIP DIP DPC   IF     1.5   MF    1.5   RF    1.5   SF    1   Thumb       Thumb RABD       Thumb PABD         WRIST AROM (Degrees)   R L   Extension  45 A   Flexion  10 A       R hand figure 8:  44cm  L hand figure 8:  41.1cm      Edema:  -  min    Sensation:  - intact light touch  - " hypersensitivity  - paraesthesias    Performance Impacted:   - ROM   - Joint mobility   - Strength   - Sensibility   - Pain   - Wound   - Scar    Outcome Measures:  Quick DASH:             TODAY'S TREATMENT    Neuro re-ed:  - AROM tendon gliding within fluidotherapy for desensitization of all surgical sites and re-ed of composite fist proprioception x 13 min  - ASL AROM to re-ed dexterity and manipulation skills x 5 min    Therapeutic ex:  - full composite fist flexion and extension in neutral x 25rps  - wrist ext/flex with full finger flexion x 25 rps  - red, grn, blue clothespins 3pt and lateral pinch for strength   - red and green flexbar sup/pron to increase  and wrist strength 3x10  - isolated EDC to increase IPJ ROM x 50 rps      EDUCATION: Risk/benefits discussed, Home exercise program, orthosis wear schedule/ purpose/ precautions, care of orthosis, wound care, edema control, activity modifications, joint protection, pain/symptom management, injury pathology, and plan of care,        Orthosis purpose:  - protect  - position  - immobilize    Wear Schedule:  - At all times   - may remove for bathing/dressing/hygiene   - may not remove for HEP   - during activity/work   - while sleeping    Assessment:   Continues to tolerate resistive ex quite well as well as dexterity exercises.  She is able to complete isolated EDC with smaller diameter dowel jarrod indicative of improved IPJ ROM.  Still reports moderate fatigue post treatment although would be expected considering severity of injury.      Level of Complexity  MOD complexity    OT Rehab Potential  Excellent      Plan:  Planned Interventions include: therapeutic exercise, self-care home management, manual therapy, therapeutic activities, , neuromuscular coordination, vasopneumatic, dry needling, and orthosis fabrication.  Frequency: 2 x Week  Duration: 12 Weeks  Goals: Set and discussed today      Goals - Patient will:       Goal 1)  verbalize/demonstrate/understanding of diagnosis and precautions       Goal 2) verbalize purpose of orthosis, wearing schedule, care and precautions       Goal 3) don/doff orthosis correctly and independently       Goal 4) verbalize/demonstrate home program, activity modification and/or adaptive equipment    All goals set today and have been met.    Plan of care was developed with input and agreement by the patient      Katelynn Chamberlain, OT

## 2024-12-18 ENCOUNTER — APPOINTMENT (OUTPATIENT)
Dept: OCCUPATIONAL THERAPY | Facility: CLINIC | Age: 54
End: 2024-12-18
Payer: COMMERCIAL

## 2024-12-19 ENCOUNTER — TREATMENT (OUTPATIENT)
Dept: PHYSICAL THERAPY | Facility: CLINIC | Age: 54
End: 2024-12-19
Payer: COMMERCIAL

## 2024-12-19 DIAGNOSIS — R42 VERTIGO: ICD-10-CM

## 2024-12-19 DIAGNOSIS — H81.13 BENIGN PAROXYSMAL POSITIONAL VERTIGO DUE TO BILATERAL VESTIBULAR DISORDER: ICD-10-CM

## 2024-12-19 PROCEDURE — 95992 CANALITH REPOSITIONING PROC: CPT | Mod: GP | Performed by: PHYSICAL THERAPIST

## 2024-12-19 PROCEDURE — 97530 THERAPEUTIC ACTIVITIES: CPT | Mod: GP | Performed by: PHYSICAL THERAPIST

## 2024-12-19 PROCEDURE — 97112 NEUROMUSCULAR REEDUCATION: CPT | Mod: GP | Performed by: PHYSICAL THERAPIST

## 2024-12-19 ASSESSMENT — PAIN - FUNCTIONAL ASSESSMENT: PAIN_FUNCTIONAL_ASSESSMENT: 0-10

## 2024-12-19 ASSESSMENT — PAIN SCALES - GENERAL: PAINLEVEL_OUTOF10: 0 - NO PAIN

## 2024-12-19 NOTE — PROGRESS NOTES
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 Vertigo  Follow Up In Physical Therapy      2. Benign paroxysmal positional vertigo due to bilateral vestibular disorder  Follow Up In Physical Therapy          Subjective   General   General Comment: Pt reports she is having difficulty with her vision when she is working on her computer with blurry type report. Pt does not report any large dizziness more fatigue symptoms.  Precautions:  Precautions  Precautions Comment: Vestibular  Pain   Pain Assessment: 0-10  0-10 (Numeric) Pain Score: 0 - No pain  Post Treatment Pain Level 0    Objective   L BPPV negative  No abnormal findings with smooth pursuit except report of eye fatigue    Treatments:    Therapeutic activity:  Therapeutic Activity  Therapeutic Activity Performed: Yes  Therapeutic Activity 1: Educated on threshold in relation to exercises  Therapeutic Activity 2: Educated on blue light filter on computer to reduce eye strain  Neuro Re-ed:   Balance/Neuromuscular Re-Education  Balance/Neuromuscular Re-Education Activity Performed: Yes  Balance/Neuromuscular Re-Education Activity 1: L epley x1  Balance/Neuromuscular Re-Education Activity 2: seated smooth pursuit PB H, V ,D 30\"x3 with 2' rest between each      Assessment   Assessment:   PT Assessment  Assessment Comment: Pt tolerated session well did not " "demonstrate any nystagmus however slight symptom of \"off sensation\" with epley and eye fatigue with smooth pursuit continued to be suggestive of L BPPV resolve and potential hypersensitivty. Discussed with patient as well as improtance of threshold and home set up such as blue light filter.    Plan:    Continue with smooth pursuit and overall vestibular progression     OP EDUCATION:   Updated HEP:   Access Code: [email or web address]  URL: [email or web address]  Date: 12/19/2024  Prepared by: Rosaura Varma    Exercises  - Seated Horizontal Smooth Pursuit  - 2 x daily - 7 x weekly - 3 sets - 30 seconds  - Seated Vertical Smooth Pursuit  - 1 x daily - 7 x weekly - 3 sets - 30 seconds  - Seated Diagonal Smooth Pursuit  - 1 x daily - 7 x weekly - 3 sets - 30 seconds    Goals:   Active       PT Problem       Pt will be 100% IND with HEP in 6 weeks in order to maintain progress with therapy.         Start:  12/13/24    Expected End:  02/21/25            Pt will report a reduction in dizziness score to 0/10 in 6 weeks in order to improve ambulation, transfers and self care tasks.        Start:  12/13/24    Expected End:  02/21/25            Pt will no longer test positive for BPPV and demonstrate ability to perform epley independent at home in 6 weeks.        Start:  12/13/24    Expected End:  02/21/25            Pt will demonstrate subjective improvement of ADLs and recreational activities through improved score of 0 on DHI in 6 weeks.        Start:  12/13/24    Expected End:  02/21/25                "

## 2024-12-27 ENCOUNTER — APPOINTMENT (OUTPATIENT)
Dept: OCCUPATIONAL THERAPY | Facility: CLINIC | Age: 54
End: 2024-12-27
Payer: COMMERCIAL

## 2024-12-31 NOTE — PROGRESS NOTES
[facility]  Hand and Upper Extremity Service  Post Operative visit         Date of surgery: 10/16/24    Surgery(s) performed: Tendon transfers to restore finger and thumb extension left forearm        Subjective report: Third postoperative visit. Her last formal therapy session was in mid-December. She had missed a few appointments during the holiday season but she's scheduled to resume next week. She's wearing her brace at night while sleeping and is overall happy with her function improvement.        Exam findings: Left forearm reveals well healed surgical incisions. Full MP and IP joint extension of all fingers. Good radial abduction of thumb and near full composite digital flexion.        Radiograph findings: No new images obtained       Impression: Sequela of left forearm dog bite injury       Plan: I'm going to have her continue with her restrictions until she sees her therapist next week and he'll progress her use of the hand. She'll return in 6 weeks for repeat clinical examination and hopefully at that point we can clear her for all normal daily activities.         Follow Up: 6 weeks             Talon Gregorio MD    [facility] [employer]  Department of Orthopaedic Surgery  Chief of Hand and Upper Extremity Surgery  [facility]    Scribe Attestation  By signing my name below, ISepideh Scribe   attest that this documentation has been prepared under the direction and in the presence of Dr. Talon Gregorio.      Dictation performed with the use of voice recognition software. Syntax and grammatical errors may exist.

## 2025-01-03 ENCOUNTER — APPOINTMENT (OUTPATIENT)
Dept: OBSTETRICS AND GYNECOLOGY | Facility: CLINIC | Age: 55
End: 2025-01-03
Payer: COMMERCIAL

## 2025-01-05 DIAGNOSIS — E11.9 TYPE 2 DIABETES MELLITUS WITHOUT COMPLICATION, WITHOUT LONG-TERM CURRENT USE OF INSULIN (MULTI): ICD-10-CM

## 2025-01-06 ENCOUNTER — APPOINTMENT (OUTPATIENT)
Dept: ORTHOPEDIC SURGERY | Facility: CLINIC | Age: 55
End: 2025-01-06
Payer: COMMERCIAL

## 2025-01-06 VITALS — HEIGHT: 64 IN | BODY MASS INDEX: 50.02 KG/M2 | WEIGHT: 293 LBS

## 2025-01-06 DIAGNOSIS — S51.802A OPEN WOUND OF LEFT FOREARM WITH TENDON INVOLVEMENT, INITIAL ENCOUNTER: Primary | ICD-10-CM

## 2025-01-06 DIAGNOSIS — S56.902A OPEN WOUND OF LEFT FOREARM WITH TENDON INVOLVEMENT, INITIAL ENCOUNTER: Primary | ICD-10-CM

## 2025-01-06 PROCEDURE — 1036F TOBACCO NON-USER: CPT | Performed by: ORTHOPAEDIC SURGERY

## 2025-01-06 PROCEDURE — 3008F BODY MASS INDEX DOCD: CPT | Performed by: ORTHOPAEDIC SURGERY

## 2025-01-06 PROCEDURE — 99024 POSTOP FOLLOW-UP VISIT: CPT | Performed by: ORTHOPAEDIC SURGERY

## 2025-01-06 RX ORDER — METFORMIN HYDROCHLORIDE 500 MG/1
1000 TABLET, EXTENDED RELEASE ORAL
Qty: 180 TABLET | Refills: 1 | Status: SHIPPED | OUTPATIENT
Start: 2025-01-06

## 2025-01-06 ASSESSMENT — PAIN SCALES - GENERAL: PAINLEVEL_OUTOF10: 5 - MODERATE PAIN

## 2025-01-06 ASSESSMENT — PAIN - FUNCTIONAL ASSESSMENT: PAIN_FUNCTIONAL_ASSESSMENT: 0-10

## 2025-01-06 ASSESSMENT — PAIN DESCRIPTION - DESCRIPTORS: DESCRIPTORS: ACHING;SORE

## 2025-01-09 DIAGNOSIS — I10 BENIGN ESSENTIAL HYPERTENSION: ICD-10-CM

## 2025-01-09 RX ORDER — VALSARTAN 320 MG/1
320 TABLET ORAL DAILY
Qty: 90 TABLET | Refills: 1 | Status: SHIPPED | OUTPATIENT
Start: 2025-01-09 | End: 2025-07-08

## 2025-01-09 NOTE — TELEPHONE ENCOUNTER
Patient called and no longer see Dr. Angel cardiologist (he moved to [facility] she said?). She needs a refill on her valsartan she is out. She said that her BP was 200/100(something) and then this morning it was 150/90. I asked if has been running this way consistently and she said it doesn't run in the 200's but runs in the 140's and 150's. Asked if she has been taking her bp medication twice a day or once daily since it says twice daily and she does take it twice a day. I advised that she check her blood pressure twice a day, make sure that her left arm is level with with her left breast. Check 3 times, disregard the highest number an average the other two. If they consistently run 150/90 call the office. Advised if her bp gets up above this and especially in the 200's she is to go to the ER and or call 911. She verbally understood. She got new insurance and asked who you would refer to for cardiology I let her know Dr. Miller office and advised her to call her insurance prior to making an appointment to make sure he is covered. She verbally understood.

## 2025-01-10 ENCOUNTER — APPOINTMENT (OUTPATIENT)
Dept: RADIOLOGY | Facility: HOSPITAL | Age: 55
End: 2025-01-10
Payer: COMMERCIAL

## 2025-01-10 ENCOUNTER — HOSPITAL ENCOUNTER (EMERGENCY)
Facility: HOSPITAL | Age: 55
Discharge: HOME | End: 2025-01-10
Attending: EMERGENCY MEDICINE
Payer: COMMERCIAL

## 2025-01-10 ENCOUNTER — APPOINTMENT (OUTPATIENT)
Dept: CARDIOLOGY | Facility: HOSPITAL | Age: 55
End: 2025-01-10
Payer: COMMERCIAL

## 2025-01-10 ENCOUNTER — APPOINTMENT (OUTPATIENT)
Dept: OCCUPATIONAL THERAPY | Facility: CLINIC | Age: 55
End: 2025-01-10
Payer: COMMERCIAL

## 2025-01-10 VITALS
HEIGHT: 64 IN | RESPIRATION RATE: 19 BRPM | WEIGHT: 293 LBS | TEMPERATURE: 97.9 F | OXYGEN SATURATION: 98 % | BODY MASS INDEX: 50.02 KG/M2 | HEART RATE: 78 BPM | DIASTOLIC BLOOD PRESSURE: 94 MMHG | SYSTOLIC BLOOD PRESSURE: 135 MMHG

## 2025-01-10 DIAGNOSIS — R06.00 DYSPNEA, UNSPECIFIED TYPE: ICD-10-CM

## 2025-01-10 DIAGNOSIS — R07.9 CHEST PAIN, UNSPECIFIED TYPE: Primary | ICD-10-CM

## 2025-01-10 LAB
ALBUMIN SERPL BCP-MCNC: 4 G/DL (ref 3.4–5)
ALP SERPL-CCNC: 89 U/L (ref 33–110)
ALT SERPL W P-5'-P-CCNC: 19 U/L (ref 7–45)
ANION GAP SERPL CALCULATED.3IONS-SCNC: 11 MMOL/L (ref 10–20)
AST SERPL W P-5'-P-CCNC: 27 U/L (ref 9–39)
ATRIAL RATE: 75 BPM
BASOPHILS # BLD AUTO: 0.04 X10*3/UL (ref 0–0.1)
BASOPHILS NFR BLD AUTO: 0.6 %
BILIRUB SERPL-MCNC: 0.5 MG/DL (ref 0–1.2)
BNP SERPL-MCNC: 17 PG/ML (ref 0–99)
BUN SERPL-MCNC: 11 MG/DL (ref 6–23)
CALCIUM SERPL-MCNC: 8.9 MG/DL (ref 8.6–10.3)
CARDIAC TROPONIN I PNL SERPL HS: 3 NG/L (ref 0–13)
CHLORIDE SERPL-SCNC: 103 MMOL/L (ref 98–107)
CO2 SERPL-SCNC: 27 MMOL/L (ref 21–32)
CREAT SERPL-MCNC: 0.67 MG/DL (ref 0.5–1.05)
EGFRCR SERPLBLD CKD-EPI 2021: >90 ML/MIN/1.73M*2
EOSINOPHIL # BLD AUTO: 0.12 X10*3/UL (ref 0–0.7)
EOSINOPHIL NFR BLD AUTO: 1.9 %
ERYTHROCYTE [DISTWIDTH] IN BLOOD BY AUTOMATED COUNT: 15.2 % (ref 11.5–14.5)
GLUCOSE SERPL-MCNC: 100 MG/DL (ref 74–99)
HCT VFR BLD AUTO: 43.8 % (ref 36–46)
HGB BLD-MCNC: 14.1 G/DL (ref 12–16)
IMM GRANULOCYTES # BLD AUTO: 0.01 X10*3/UL (ref 0–0.7)
IMM GRANULOCYTES NFR BLD AUTO: 0.2 % (ref 0–0.9)
LIPASE SERPL-CCNC: 59 U/L (ref 9–82)
LYMPHOCYTES # BLD AUTO: 2.19 X10*3/UL (ref 1.2–4.8)
LYMPHOCYTES NFR BLD AUTO: 35 %
MAGNESIUM SERPL-MCNC: 1.89 MG/DL (ref 1.6–2.4)
MCH RBC QN AUTO: 26.9 PG (ref 26–34)
MCHC RBC AUTO-ENTMCNC: 32.2 G/DL (ref 32–36)
MCV RBC AUTO: 83 FL (ref 80–100)
MONOCYTES # BLD AUTO: 0.32 X10*3/UL (ref 0.1–1)
MONOCYTES NFR BLD AUTO: 5.1 %
NEUTROPHILS # BLD AUTO: 3.58 X10*3/UL (ref 1.2–7.7)
NEUTROPHILS NFR BLD AUTO: 57.2 %
NRBC BLD-RTO: 0 /100 WBCS (ref 0–0)
P AXIS: 42 DEGREES
P OFFSET: 215 MS
P ONSET: 154 MS
PLATELET # BLD AUTO: 243 X10*3/UL (ref 150–450)
POTASSIUM SERPL-SCNC: 4.6 MMOL/L (ref 3.5–5.3)
PR INTERVAL: 126 MS
PROT SERPL-MCNC: 7.1 G/DL (ref 6.4–8.2)
Q ONSET: 217 MS
QRS COUNT: 12 BEATS
QRS DURATION: 92 MS
QT INTERVAL: 418 MS
QTC CALCULATION(BAZETT): 466 MS
QTC FREDERICIA: 450 MS
R AXIS: 38 DEGREES
RBC # BLD AUTO: 5.25 X10*6/UL (ref 4–5.2)
SODIUM SERPL-SCNC: 136 MMOL/L (ref 136–145)
T AXIS: 47 DEGREES
T OFFSET: 426 MS
VENTRICULAR RATE: 75 BPM
WBC # BLD AUTO: 6.3 X10*3/UL (ref 4.4–11.3)

## 2025-01-10 PROCEDURE — 94640 AIRWAY INHALATION TREATMENT: CPT

## 2025-01-10 PROCEDURE — 84295 ASSAY OF SERUM SODIUM: CPT | Performed by: EMERGENCY MEDICINE

## 2025-01-10 PROCEDURE — 71045 X-RAY EXAM CHEST 1 VIEW: CPT

## 2025-01-10 PROCEDURE — 84484 ASSAY OF TROPONIN QUANT: CPT | Performed by: EMERGENCY MEDICINE

## 2025-01-10 PROCEDURE — 99285 EMERGENCY DEPT VISIT HI MDM: CPT | Performed by: EMERGENCY MEDICINE

## 2025-01-10 PROCEDURE — 83690 ASSAY OF LIPASE: CPT | Performed by: EMERGENCY MEDICINE

## 2025-01-10 PROCEDURE — 83880 ASSAY OF NATRIURETIC PEPTIDE: CPT | Performed by: EMERGENCY MEDICINE

## 2025-01-10 PROCEDURE — 2500000002 HC RX 250 W HCPCS SELF ADMINISTERED DRUGS (ALT 637 FOR MEDICARE OP, ALT 636 FOR OP/ED): Performed by: EMERGENCY MEDICINE

## 2025-01-10 PROCEDURE — 85025 COMPLETE CBC W/AUTO DIFF WBC: CPT | Performed by: EMERGENCY MEDICINE

## 2025-01-10 PROCEDURE — 71045 X-RAY EXAM CHEST 1 VIEW: CPT | Mod: FOREIGN READ | Performed by: RADIOLOGY

## 2025-01-10 PROCEDURE — 93005 ELECTROCARDIOGRAM TRACING: CPT

## 2025-01-10 PROCEDURE — 36415 COLL VENOUS BLD VENIPUNCTURE: CPT | Performed by: EMERGENCY MEDICINE

## 2025-01-10 PROCEDURE — 83735 ASSAY OF MAGNESIUM: CPT | Performed by: EMERGENCY MEDICINE

## 2025-01-10 RX ORDER — ALBUTEROL SULFATE 90 UG/1
2 INHALANT RESPIRATORY (INHALATION) EVERY 4 HOURS PRN
Qty: 18 G | Refills: 0 | Status: SHIPPED | OUTPATIENT
Start: 2025-01-10 | End: 2025-02-09

## 2025-01-10 RX ORDER — SUCRALFATE 1 G/10ML
1 SUSPENSION ORAL ONCE
Status: COMPLETED | OUTPATIENT
Start: 2025-01-10 | End: 2025-01-10

## 2025-01-10 RX ORDER — SUCRALFATE 1 G/1
1 TABLET ORAL
Qty: 20 TABLET | Refills: 0 | Status: SHIPPED | OUTPATIENT
Start: 2025-01-10 | End: 2025-01-15

## 2025-01-10 RX ORDER — IPRATROPIUM BROMIDE AND ALBUTEROL SULFATE 2.5; .5 MG/3ML; MG/3ML
3 SOLUTION RESPIRATORY (INHALATION)
Status: DISCONTINUED | OUTPATIENT
Start: 2025-01-10 | End: 2025-01-10 | Stop reason: HOSPADM

## 2025-01-10 RX ADMIN — SUCRALFATE ORAL SUSPENSION 1 G: 1 SUSPENSION ORAL at 09:17

## 2025-01-10 RX ADMIN — IPRATROPIUM BROMIDE AND ALBUTEROL SULFATE 3 ML: 2.5; .5 SOLUTION RESPIRATORY (INHALATION) at 09:17

## 2025-01-10 ASSESSMENT — LIFESTYLE VARIABLES
TOTAL SCORE: 0
HAVE YOU EVER FELT YOU SHOULD CUT DOWN ON YOUR DRINKING: NO
HAVE PEOPLE ANNOYED YOU BY CRITICIZING YOUR DRINKING: NO
EVER FELT BAD OR GUILTY ABOUT YOUR DRINKING: NO
EVER HAD A DRINK FIRST THING IN THE MORNING TO STEADY YOUR NERVES TO GET RID OF A HANGOVER: NO

## 2025-01-10 ASSESSMENT — HEART SCORE
HISTORY: SLIGHTLY SUSPICIOUS
TROPONIN: LESS THAN OR EQUAL TO NORMAL LIMIT
AGE: 45-64
HEART SCORE: 3
RISK FACTORS: >2 RISK FACTORS OR HX OF ATHEROSCLEROTIC DISEASE
ECG: NORMAL

## 2025-01-10 ASSESSMENT — PAIN SCALES - GENERAL: PAINLEVEL_OUTOF10: 0 - NO PAIN

## 2025-01-10 ASSESSMENT — PAIN - FUNCTIONAL ASSESSMENT: PAIN_FUNCTIONAL_ASSESSMENT: 0-10

## 2025-01-10 NOTE — ED PROVIDER NOTES
Department of Emergency Medicine   ED  Provider Note  Admit Date/RoomTime: 1/10/2025  8:50 AM  ED Room: SE/SE        History of Present Illness:  Chief Complaint   Patient presents with    Chest Pain    Hypertension     This past Wednesday, patient had blood pressure over 200 systollically. Currently waiting for prescription refill. Patient states doctor told her to come her if bp was over 150/90 and it was 187/110. Also complaints of shortness of breath, chest pain, dizziness.         Vilma Grover is a 54 y.o. female history of diabetes, hypertension, hypercholesterolemia presents to the emergency department complaints of elevated blood sugar not feeling well and intermittent spasms in her chest.  Patient reports for the past couple days she has not felt well.  She is notes that her blood pressure has been elevated over the past few days of systolic of 200 over the high number on the bottom and then yesterday all day was 150/109.  She reports increased amount of stress in her life by taking care of her mother who has dementia.  She denies any fever or chills.  No cough congestion runny nose sore throat occasional shortness of breath.  No abdominal pain no nausea no vomiting no diarrhea denies any increased weight gain no swelling.  Reports she had a workup in the past for something similar and found to have reflux but states this is different.  When she arrived here today her blood pressure was improved.  She is on her losartan twice a day and takes it as directed.  Currently has no chest pain.    Review of Systems:   Pertinent positives and negatives are stated within HPI, all other systems reviewed and are negative.        --------------------------------------------- PAST HISTORY ---------------------------------------------  Past Medical History:  has a past medical history of Abnormal Pap smear of cervix, Anxiety, Colon polyps, Depression, Diabetes (Multi), High cholesterol, Hypertension, Obesity, Sleep  apnea, Urinary tract infection, and Varicella.  Past Surgical History:  has a past surgical history that includes  section, classic (); Other surgical history (10/23/2014); Appendectomy; Breast biopsy (Right); Condyloma excision/fulguration;  section, classic (); and  section, classic ().  Social History:  reports that she quit smoking about 7 years ago. Her smoking use included cigarettes. She started smoking about 27 years ago. She has a 20 pack-year smoking history. She has been exposed to tobacco smoke. She has never used smokeless tobacco. She reports current alcohol use of about 2.0 standard drinks of alcohol per week. She reports that she does not use drugs.  Family History: family history includes Anxiety disorder in her brother; Arthritis in her mother; Atrial fibrillation in her father; Coronary artery disease in her mother; Dementia in her mother; Depression in her brother and mother; Hearing loss in her mother; Heart attack in her mother; Migraines in her daughter; Prostate cancer in her father; Uterine leiomyoma in her mother; bruises easily in her daughter.. Unless otherwise noted, family history is non contributory  The patient’s home medications have been reviewed.  Allergies: Empagliflozin, Erythromycin base, and Lisinopril        ---------------------------------------------------PHYSICAL EXAM--------------------------------------    GENERAL APPEARANCE: Awake and alert.   VITAL SIGNS: As per the nurses' triage record.   HEENT: Normocephalic, atraumatic. Extraocular muscles are intact. Pupils equal round and reactive to light. Conjunctiva are pink. Negative scleral icterus. Mucous membranes are moist. Tongue in the midline. Pharynx was without erythema or exudates, uvula midline  NECK: Soft Nontender and supple, full gross ROM, no meningeal signs.  CHEST: Nontender to palpation. Clear to auscultation bilaterally. No rales, rhonchi, or wheezing.   HEART: S1, S2.  "Regular rate and rhythm. No murmurs, gallops or rubs.  Strong and equal pulses in the extremities.   ABDOMEN: Soft, nontender, nondistended, positive bowel sounds, no palpable masses.  MUSCULCSKELETAL: Full gross active range of motion. Ambulating on own with no acute difficulties  NEUROLOGICAL: Awake, alert and oriented x 3. Power intact in the upper and lower extremities. Sensation is intact to light touch in the upper and lower extremities.   IMMUNOLOGICAL: No lymphatic streaking noted   DERM: No petechiae, rashes, or ecchymoses.          ------------------------- NURSING NOTES AND VITALS REVIEWED ---------------------------  The nursing notes within the ED encounter and vital signs as below have been reviewed by myself  BP (!) 135/94 (BP Location: Left arm, Patient Position: Sitting)   Pulse 78   Temp 36.6 °C (97.9 °F) (Oral)   Resp 19   Ht 1.626 m (5' 4\")   Wt 141 kg (310 lb)   SpO2 98%   BMI 53.21 kg/m²     Oxygen Saturation Interpretation: 98% room air    The cardiac monitor revealed sinus rhythm with a heart rate in the 70s as interpreted by me. The cardiac monitor was ordered secondary to the patient's heart rate and to monitor the patient for dysrhythmia.       The patient’s available past medical records and past encounters were reviewed.          -----------------------DIAGNOSTIC RESULTS------------------------  LABS:    Labs Reviewed   CBC WITH AUTO DIFFERENTIAL - Abnormal       Result Value    WBC 6.3      nRBC 0.0      RBC 5.25 (*)     Hemoglobin 14.1      Hematocrit 43.8      MCV 83      MCH 26.9      MCHC 32.2      RDW 15.2 (*)     Platelets 243      Neutrophils % 57.2      Immature Granulocytes %, Automated 0.2      Lymphocytes % 35.0      Monocytes % 5.1      Eosinophils % 1.9      Basophils % 0.6      Neutrophils Absolute 3.58      Immature Granulocytes Absolute, Automated 0.01      Lymphocytes Absolute 2.19      Monocytes Absolute 0.32      Eosinophils Absolute 0.12      Basophils " Absolute 0.04     COMPREHENSIVE METABOLIC PANEL - Abnormal    Glucose 100 (*)     Sodium 136      Potassium 4.6      Chloride 103      Bicarbonate 27      Anion Gap 11      Urea Nitrogen 11      Creatinine 0.67      eGFR >90      Calcium 8.9      Albumin 4.0      Alkaline Phosphatase 89      Total Protein 7.1      AST 27      Bilirubin, Total 0.5      ALT 19     TROPONIN I, HIGH SENSITIVITY - Normal    Troponin I, High Sensitivity 3      Narrative:     Less than 99th percentile of normal range cutoff-  Female and children under 18 years old <14 ng/L; Male <21 ng/L: Negative  Repeat testing should be performed if clinically indicated.     Female and children under 18 years old 14-50 ng/L; Male 21-50 ng/L:  Consistent with possible cardiac damage and possible increased clinical   risk. Serial measurements may help to assess extent of myocardial damage.     >50 ng/L: Consistent with cardiac damage, increased clinical risk and  myocardial infarction. Serial measurements may help assess extent of   myocardial damage.      NOTE: Children less than 1 year old may have higher baseline troponin   levels and results should be interpreted in conjunction with the overall   clinical context.     NOTE: Troponin I testing is performed using a different   testing methodology at [facility] than at [facility]. Direct result comparisons should only   be made within the same method.   LIPASE - Normal    Lipase 59      Narrative:     Venipuncture immediately after or during the administration of Metamizole may lead to falsely low results. Testing should be performed immediately prior to Metamizole dosing.   MAGNESIUM - Normal    Magnesium 1.89     B-TYPE NATRIURETIC PEPTIDE - Normal    BNP 17      Narrative:        <100 pg/mL - Heart failure unlikely  100-299 pg/mL - Intermediate probability of acute heart                  failure exacerbation. Correlate with clinical                  context and patient  history.    >=300 pg/mL - Heart Failure likely. Correlate with clinical                  context and patient history.    BNP testing is performed using different testing methodology at [facility] than at other [facility] hospitals. Direct result comparisons should only be made within the same method.          As interpreted by me, the above displayed labs are abnormal. All other labs obtained during this visit were within normal range or not returned as of this dictation.      EKG Interpretation    EKG @per attending note EKG interpretation  Obtained 0 917 reviewed 0 920  No acute ST elevation depression or signs of acute ischemia normal sinus rhythm rate 67  QRS 80 QTc 439 no bundle branch block normal axis nonspecific ST changes  Grossly unchanged from EKG obtained 11/21/2024  Per my independent interpretation        XR chest 1 view   Final Result   No acute process.   Signed by Lamont Nova MD              XR chest 1 view   Final Result   No acute process.   Signed by Lamont Nova MD              ------------------------------ ED COURSE/MEDICAL DECISION MAKING----------------------  Medical Decision Making:   Exam: A medically appropriate exam performed, outlined above, given the known history and presentation.    History obtained from: Review of medical record nursing notes patient      Social Determinants of Health considered during this visit: Takes care of herself at home      PAST MEDICAL HISTORY/Chronic Conditions Affecting Care     has a past medical history of Abnormal Pap smear of cervix, Anxiety, Colon polyps, Depression, Diabetes (Multi), High cholesterol, Hypertension, Obesity, Sleep apnea, Urinary tract infection, and Varicella.       CC/HPI Summary, Social Determinants of health, Records Reviewed, DDx, testing done/not done, ED Course, Reassessment, disposition considerations/shared decision making with patient, consults, disposition:   Presents with elevated blood pressure  overall not feeling well intermittent shortness of breath and intermittent chest twinges.  Currently no chest pain  Plan  CBC  CMP  Lipase  Magnesium  Troponin  DuoNeb  Carafate  Chest x-ray No acute process.   EKG  BNP  Medical Decision Making/Differential Diagnosis:  Differentials include not limited to stress versus biliary colic versus reflux versus acute coronary syndrome versus electrolyte abnormality versus dehydration versus viral illness versus bronchospasm  Glucose 100  Electrolytes unremarkable  Normal renal function  Normal LFTs  Lipase 59  BNP 17  Troponin 3  Magnesium is 1.89  White blood cell count 6.3  Hemoglobin   patient presented with complaints of not feeling well elevated blood pressure.  Intermittent chest twinges no chest pain at this time.  Reports she had the symptoms earlier this morning.  Heart score of 3.  EKG per attending note no ST elevation depression or signs of acute ischemia rate is controlled.  No complaints of chest pain.  Troponin 3.  Electrolytes unremarkable.  Normal renal function.  Normal LFTs.  proBNP is not elevated.  Magnesium normal.  Lipase normal.  Patient not hypoglycemic.  No signs of DKA.  No elevation white blood cell count indicate infection and she is not anemic.  Chest x-ray showed no acute cardiopulmonary process.  Patient does have significant history of hypertension.  Blood pressure improved from what she was reporting at home.  Patient received a breathing treatment lung sounds are clear repeat evaluation lung sounds clear patient reports she is now able to take a deep breath without the twinging sensation in her chest.  Based on her clinical presentation history and symptoms consistent with chest pain dyspnea advised follow-up with primary care physician.  Patient was given prescription for albuterol and Carafate to help her with reflux.  Discharge per attending note.  Patient amenable to plan amenable to discharge CMT for discharge  Discharge planning  return with any worsening symptoms or concerns  Patient seen evaluated with attending physician Dr. Garcia    PROCEDURES  Unless otherwise noted below, none      CONSULTS:   None      ED Course as of 01/10/25 1813   Fri Prince 10, 2025   0905 Patient states has been under large amount stress lately as he is caring for her mother with dementia, no history of cardiac disease the past discussed patient has blood pressures improved without any intervention possibly although she has not been feeling well do not believe it was her elevated blood pressure causing that more likely that her not feeling well because the increase in her blood pressure, maintain adequate O2 on room air at this time, former smoker no known history of asthma or reactive airway disease no new medications or medication changes.  No active chest pain at this time [SL]   0947 Discussed with patient EKG reassuring chest x-ray negative patient feeling improved after nebulized breathing treatment awaiting troponin result and final disposition [SL]   1035 Patient sally feeling much improved, troponin negative has ointment follow-up PCP next week discussed further cardiac testing necessary heart score discussed, will discharge with butyryl inhaler to use every 4 hours while awake the next 4 days [SL]   1036  Carafate for added GI protection [SL]   1100 Patient reports she is feeling much better.  Has no chest pain.  No lightheadedness no dizziness no wheezing.  No nausea no vomiting.  Lung sounds reassessed clear [TB]      ED Course User Index  [SL] Arsenio Garcia DO  [TB] Katie Singleton, APRN-CNP         Diagnoses as of 01/10/25 1813   Chest pain, unspecified type   Dyspnea, unspecified type         This patient has remained hemodynamically stable during their ED course.      Critical Care: None      Counseling:  The emergency provider has spoken with the patient and discussed today’s results, in addition to providing specific details for the plan of  care and counseling regarding the diagnosis and prognosis.  Questions are answered at this time and they are agreeable with the plan.         --------------------------------- IMPRESSION AND DISPOSITION ---------------------------------    IMPRESSION  1. Chest pain, unspecified type    2. Dyspnea, unspecified type        DISPOSITION  Disposition: Discharge home  Patient condition is stable        NOTE: This report was transcribed using voice recognition software. Every effort was made to ensure accuracy; however, inadvertent computerized transcription errors may be present      JAMSHID Quach  01/10/25 1813       JAMSHID Quach  01/10/25 1813

## 2025-01-10 NOTE — ED PROVIDER NOTES
EMERGENCY DEPARTMENT ENCOUNTER      Pt Name: Vilma Grover  MRN: [id number]  Birthdate 1970  Date of evaluation: 1/10/2025  ED Provider: Arsenio Garcia DO     CHIEF COMPLAINT       Chief Complaint   Patient presents with    Chest Pain    Hypertension     This past Wednesday, patient had blood pressure over 200 systollically. Currently waiting for prescription refill. Patient states doctor told her to come her if bp was over 150/90 and it was 187/110. Also complaints of shortness of breath, chest pain, dizziness.         HISTORY OF PRESENT ILLNESS   (Location/Symptom, Timing/Onset, Context/Setting, Quality, Duration, Modifying Factors, Severity) Note limiting factors.   I wore appropriate PPE for the entirety of this encounter.      HPI    Vilma Grover is a 54 y.o. who presents to the emergency department for evaluation of chest tightness and elevated blood pressure at the direction of PCP.  No new medications.  No productive cough hemoptysis.    Nursing Notes were reviewed.    History obtained from :  patient    Outside records reviewed: N/A      History/Collateral information obtained from: N/A         Patient's PDMP reviewed personally    ED Course as of 01/14/25 0225   Fri Prince 10, 2025   0905 Patient states has been under large amount stress lately as he is caring for her mother with dementia, no history of cardiac disease the past discussed patient has blood pressures improved without any intervention possibly although she has not been feeling well do not believe it was her elevated blood pressure causing that more likely that her not feeling well because the increase in her blood pressure, maintain adequate O2 on room air at this time, former smoker no known history of asthma or reactive airway disease no new medications or medication changes.  No active chest pain at this time [SL]   0947 Discussed with patient EKG reassuring chest x-ray negative patient feeling improved after nebulized breathing  treatment awaiting troponin result and final disposition [SL]   1035 Patient sally feeling much improved, troponin negative has ointment follow-up PCP next week discussed further cardiac testing necessary heart score discussed, will discharge with butyryl inhaler to use every 4 hours while awake the next 4 days [SL]   1036  Carafate for added GI protection [SL]   1100 Patient reports she is feeling much better.  Has no chest pain.  No lightheadedness no dizziness no wheezing.  No nausea no vomiting.  Lung sounds reassessed clear [TB]      ED Course User Index  [SL] Arsenio Garcia DO  [TB] Katie Singleton, APRN-CNP         Diagnoses as of 01/14/25 0225   Chest pain, unspecified type   Dyspnea, unspecified type        Discussion/ MDM:    EKG interpretation  Obtained 0 917 reviewed 0 920  No acute ST elevation depression or signs of acute ischemia normal sinus rhythm rate 67  QRS 80 QTc 439 no bundle branch block normal axis nonspecific ST changes  Grossly unchanged from EKG obtained 11/21/2024  Per my independent interpretation      All orded diagnostic testing results obtained were reviewed and interpreted, results trended/compared with previous levels if available to evaluate for abnormality/interval change contributing to today´s presentation,      tropon obtained without any significant elevation, no significant increase in delta obtained, or significant deviation when compared to previous lab values if present for comparisonImproved symptomatically in ED with treatments provided  Heart score discussed for shared decision making, Discussed presumed diagnosis and plan, provided opportunity to ask any further questions, all questions answered to the best of my ability, Will return at any time if symptoms worsen, new symptoms develop, or any new concerns arise.     Discussed heart score with patient, Heart score 3 or less and risk of MACE of 0.9-1.7%. in the next six weeks, patient understands although low  risk they´re still at risk and will discuss this with their PCP and obtain further outpatient cardiac testing in the next 7 days.  Offered observation for further monitoring evaluation and treatment if patient or any others present felt uncomfortable with plan, understand risks but feel comfortable following up as outpatient and returning immediately at any time For further evaluation and treatment as needed     Chart created with voice recognition software, errors may be present due to software´s interpretation          REVIEW OF SYSTEMS     Review of Systems  Pertinent positives and negatives as per HPI    PAST MEDICAL HISTORY     Past Medical History:   Diagnosis Date    Abnormal Pap smear of cervix     Anxiety     Colon polyps     Depression     Diabetes (Multi)     High cholesterol     Hypertension     Obesity     Sleep apnea     Urinary tract infection     Varicella        SURGICAL HISTORY       Past Surgical History:   Procedure Laterality Date    APPENDECTOMY      BREAST BIOPSY Right     2018 right stereo bx benign through ccl     SECTION, CLASSIC       Section     SECTION, CLASSIC       SECTION, CLASSIC      baby passed away    CONDYLOMA EXCISION/FULGURATION      OTHER SURGICAL HISTORY  10/23/2014    Bunion Correction By Anderson Procedure       CURRENT MEDICATIONS       Discharge Medication List as of 1/10/2025 10:39 AM        CONTINUE these medications which have NOT CHANGED    Details   acetaminophen (Tylenol) 500 mg tablet Take 2 tablets (1,000 mg) by mouth every 6 hours if needed for mild pain (1 - 3)., Historical Med      aspirin (Hendry Aspirin) 81 mg EC tablet Take 1 tablet (81 mg) by mouth once daily., Historical Med      atorvastatin (Lipitor) 20 mg tablet Take 1 tablet (20 mg) by mouth once daily., Historical Med      calcium carb-mag ox-zinc gluc 333-133-5 mg tablet Take 1 tablet by mouth once daily at bedtime., Historical Med      cholecalciferol  (Vitamin D3) 50 MCG (2000 UT) tablet Take 1 tablet (50 mcg) by mouth once daily at bedtime., Historical Med      citalopram (CeleXA) 40 mg tablet TAKE ONE TABLET BY MOUTH EVERY DAY, Starting Mon 10/14/2024, Normal      cyanocobalamin (Vitamin B-12) 100 mcg tablet Take 5 tablets (500 mcg) by mouth once daily. For 30 days, Historical Med      dulaglutide 3 mg/0.5 mL pen injector Inject 3 mg under the skin 1 (one) time per week., Starting Sun 5/19/2024, Normal      famotidine (Pepcid) 20 mg tablet Take 1 tablet (20 mg) by mouth 2 times a day for 15 days., Starting Thu 11/21/2024, Until Fri 12/6/2024, Normal      ferrous sulfate 325 (65 Fe) MG EC tablet Take 65 mg by mouth 3 times daily (morning, midday, late afternoon). Do not crush, chew, or split., Historical Med      lancets misc Use to monitor blood glucose once daily, Normal      metFORMIN  mg 24 hr tablet Take 2 tablets (1,000 mg) by mouth once daily in the evening. Take with meals., Starting Mon 1/6/2025, Normal      MULTIVITAMIN ORAL Take 1 tablet by mouth once daily. For 30 days, Historical Med      nystatin (Mycostatin) cream Apply 1 Application topically 2 times a day as needed (rash/irritation)., Historical Med      OneTouch Ultra Test strip 1 each once daily., Starting Tue 8/27/2024, Normal      valsartan (Diovan) 320 mg tablet Take 1 tablet (320 mg) by mouth once daily., Starting Thu 1/9/2025, Until Tue 7/8/2025, Normal             ALLERGIES     Empagliflozin, Erythromycin base, and Lisinopril    FAMILY HISTORY       Family History   Problem Relation Name Age of Onset    Dementia Mother Fely     Coronary artery disease Mother Fely     Heart attack Mother Fely     Other (Uterine leiomyoma) Mother Fely     Arthritis Mother Fely     Depression Mother Fely     Hearing loss Mother Fely     Prostate cancer Father Chato         in remission heart vave replacment in 2/2023    Atrial fibrillation Father Chato      Anxiety disorder Brother      Depression Brother      Migraines Daughter      Other (bruises easily) Daughter          SOCIAL HISTORY       Social History     Socioeconomic History    Marital status:    Tobacco Use    Smoking status: Former     Current packs/day: 0.00     Average packs/day: 1 pack/day for 20.0 years (20.0 ttl pk-yrs)     Types: Cigarettes     Start date:      Quit date: 2018     Years since quittin.0     Passive exposure: Past    Smokeless tobacco: Never    Tobacco comments:     Quit 2018   Vaping Use    Vaping status: Never Used   Substance and Sexual Activity    Alcohol use: Yes     Alcohol/week: 2.0 standard drinks of alcohol     Types: 2 Glasses of wine per week     Comment: Drink occasionally    Drug use: Never    Sexual activity: Yes     Partners: Male     Birth control/protection: None     Social Drivers of Health     Financial Resource Strain: Low Risk  (6/3/2024)    Overall Financial Resource Strain (CARDIA)     Difficulty of Paying Living Expenses: Not hard at all   Food Insecurity: Food Insecurity Present (2020)    Received from [facility]    Hunger Vital Sign     Worried About Running Out of Food in the Last Year: Sometimes true     Ran Out of Food in the Last Year: Sometimes true   Transportation Needs: No Transportation Needs (2024)    OASIS : Transportation     Lack of Transportation (Medical): No     Lack of Transportation (Non-Medical): No     Patient Unable or Declines to Respond: No   Physical Activity: Unknown (2/15/2023)    Received from [facility]    Exercise Vital Sign     Days of Exercise per Week: Patient declined     Minutes of Exercise per Session: Patient declined   Stress: Stress Concern Present (2/15/2023)    Received from [facility]    [employer] - Occupational Stress Questionnaire     Feeling of Stress : Rather much   Social Connections:  Feeling Socially Integrated (6/27/2024)    OASIS : Social Isolation     Frequency of experiencing loneliness or isolation: Never   Housing Stability: Unknown (6/3/2024)    Housing Stability Vital Sign     Unable to Pay for Housing in the Last Year: No     Unstable Housing in the Last Year: No       PHYSICAL EXAM       ED Triage Vitals [01/10/25 0849]   Temperature Heart Rate Respirations BP   36.6 °C (97.9 °F) 78 19 (!) 135/94      Pulse Ox Temp Source Heart Rate Source Patient Position   98 % Oral Monitor Sitting      BP Location FiO2 (%)     Left arm --         Physical Exam  PHYSICIAL EXAM: Vitals reviewed      GENERAL: Nontoxic appearing in room, nondiaphoretic, awake and alert, the patient appears nourished and normally developed. Vital signs as documented.      EYES:   Head exam is unremarkable. No scleral icterus      HEENT:  Mucous membranes moist. Nares patent without copious rhinorrhea. , controlling secretions well      LUNGS:   Symptoms reproducible deep inspiration, mild decreased chest excursion, + mild increased work of breathing without any respiratory distress .  No active coughing, maintaining adequate SpO2 on room air      CARDIAC:   Rhythm is regular. No dysrhythmias or murmurs.        EXTREMITIES:   No peripheral edema, with no obvious deformities. No asymmetric swelling bilateral lower extremities, no calf pain to palpation, Well-perfused no cyanosis no clinical exam findings concerning for deep venous thrombosis      SKIN:   Good color, with no significant rashes.  No pallor.     NEURO:  No obvious neurological deficits, patient able to ambulate.     PSYCH: Mood and affect normal.  Appropriate for age.    DIAGNOSTIC RESULTS     RADIOLOGY (Per Emergency Physician):   Per my independendant interpretation pre-alfredo read  XR chest no focal infiltrate pneumothorax otherwise normal  defer to radiologist final report  Interpretation per the Radiologist below, if available at the time of this  note:  XR chest 1 view   Final Result   No acute process.   Signed by Lamont Nova MD            LABS:  Labs Reviewed   CBC WITH AUTO DIFFERENTIAL - Abnormal       Result Value    WBC 6.3      nRBC 0.0      RBC 5.25 (*)     Hemoglobin 14.1      Hematocrit 43.8      MCV 83      MCH 26.9      MCHC 32.2      RDW 15.2 (*)     Platelets 243      Neutrophils % 57.2      Immature Granulocytes %, Automated 0.2      Lymphocytes % 35.0      Monocytes % 5.1      Eosinophils % 1.9      Basophils % 0.6      Neutrophils Absolute 3.58      Immature Granulocytes Absolute, Automated 0.01      Lymphocytes Absolute 2.19      Monocytes Absolute 0.32      Eosinophils Absolute 0.12      Basophils Absolute 0.04     COMPREHENSIVE METABOLIC PANEL - Abnormal    Glucose 100 (*)     Sodium 136      Potassium 4.6      Chloride 103      Bicarbonate 27      Anion Gap 11      Urea Nitrogen 11      Creatinine 0.67      eGFR >90      Calcium 8.9      Albumin 4.0      Alkaline Phosphatase 89      Total Protein 7.1      AST 27      Bilirubin, Total 0.5      ALT 19     TROPONIN I, HIGH SENSITIVITY - Normal    Troponin I, High Sensitivity 3      Narrative:     Less than 99th percentile of normal range cutoff-  Female and children under 18 years old <14 ng/L; Male <21 ng/L: Negative  Repeat testing should be performed if clinically indicated.     Female and children under 18 years old 14-50 ng/L; Male 21-50 ng/L:  Consistent with possible cardiac damage and possible increased clinical   risk. Serial measurements may help to assess extent of myocardial damage.     >50 ng/L: Consistent with cardiac damage, increased clinical risk and  myocardial infarction. Serial measurements may help assess extent of   myocardial damage.      NOTE: Children less than 1 year old may have higher baseline troponin   levels and results should be interpreted in conjunction with the overall   clinical context.     NOTE: Troponin I testing is performed using a different  "  testing methodology at [facility] than at other   [facility]. Direct result comparisons should only   be made within the same method.   LIPASE - Normal    Lipase 59      Narrative:     Venipuncture immediately after or during the administration of Metamizole may lead to falsely low results. Testing should be performed immediately prior to Metamizole dosing.   MAGNESIUM - Normal    Magnesium 1.89     B-TYPE NATRIURETIC PEPTIDE - Normal    BNP 17      Narrative:        <100 pg/mL - Heart failure unlikely  100-299 pg/mL - Intermediate probability of acute heart                  failure exacerbation. Correlate with clinical                  context and patient history.    >=300 pg/mL - Heart Failure likely. Correlate with clinical                  context and patient history.    BNP testing is performed using different testing methodology at [facility] than at other [facility]. Direct result comparisons should only be made within the same method.          All other labs were within normal range or not returned as of this dictation.    EMERGENCY DEPARTMENT COURSE :   Vitals:    Vitals:    01/10/25 0849   BP: (!) 135/94   BP Location: Left arm   Patient Position: Sitting   Pulse: 78   Resp: 19   Temp: 36.6 °C (97.9 °F)   TempSrc: Oral   SpO2: 98%   Weight: 141 kg (310 lb)   Height: 1.626 m (5' 4\")       Medications   sucralfate (Carafate) suspension 1 g (1 g oral Given 1/10/25 0917)             PROCEDURES:  Unless otherwise noted below, none     Procedures    CRITICAL CARE TIME       FINAL IMPRESSION      1. Chest pain, unspecified type    2. Dyspnea, unspecified type          DISPOSITION    Discharge 01/10/2025 10:36:11 AM    PATIENT REFERRED TO:  Rachel Savage, APRN-CNP  [address]  [facility], [place] [id number] [phone]            DISCHARGE MEDICATIONS:  Discharge Medication List as of 1/10/2025 10:39 AM        START taking these " medications    Details   albuterol 90 mcg/actuation inhaler Inhale 2 puffs every 4 hours if needed for wheezing or shortness of breath. Q4h while awake x 4 days then prn, please add spacer, Starting Fri 1/10/2025, Until Sun 2/9/2025 at 2359, Normal      sucralfate (Carafate) 1 gram tablet Take 1 tablet (1 g) by mouth 4 times a day before meals for 5 days., Starting Fri 1/10/2025, Until Wed 1/15/2025, Normal                (Comment: Please note this report has been produced using speech recognition software and may contain errors related to that system including errors in grammar, punctuation, and spelling, as well as words and phrases that may be inappropriate.  If there are any questions or concerns please feel free to contact the dictating provider for clarification.)    Arsenio Garcia DO (electronically signed)  Emergency Medicine Provider     Arsenio Garcia DO  01/10/25 1039       Arsenio Garcia DO  01/14/25 0224

## 2025-01-10 NOTE — DISCHARGE INSTRUCTIONS
Thank you for choosing [facility] Emergency Department and allowing me to take care of you today.     If your symptoms are not improving, begin to worsen, new symptoms develop/additional concerns arise, please return to the Emergency Department at any time for further evaluation and treatment. .     Dr. Arsenio Garcia Jr., D.O.     Follow-up with your primary care doctor or the follow up contact information provided in 2-3 days or as otherwise directed to determine further follow-up or with Cardiology contact information provided as discussed emergency department     Heart score for chest pain 3 or less and risk of MACE of 0.9-1.7%. in the next six weeks, although you are low risk, you are still at risk and need to discuss this with your PCP or a Cardiologist to determine the need for further outpatient cardiac testing, and at any time prior if symptoms worsen or new symptoms develop return immediately to emergency department for re-evaluation and further treatment as deemed necessary

## 2025-01-15 ENCOUNTER — OFFICE VISIT (OUTPATIENT)
Dept: PRIMARY CARE | Facility: CLINIC | Age: 55
End: 2025-01-15
Payer: COMMERCIAL

## 2025-01-15 ENCOUNTER — TELEPHONE (OUTPATIENT)
Dept: PRIMARY CARE | Facility: CLINIC | Age: 55
End: 2025-01-15

## 2025-01-15 VITALS
DIASTOLIC BLOOD PRESSURE: 80 MMHG | OXYGEN SATURATION: 98 % | HEART RATE: 75 BPM | SYSTOLIC BLOOD PRESSURE: 126 MMHG | RESPIRATION RATE: 16 BRPM | BODY MASS INDEX: 50.02 KG/M2 | WEIGHT: 293 LBS | HEIGHT: 64 IN

## 2025-01-15 DIAGNOSIS — E11.69 TYPE 2 DIABETES MELLITUS WITH OBESITY (MULTI): ICD-10-CM

## 2025-01-15 DIAGNOSIS — E66.9 TYPE 2 DIABETES MELLITUS WITH OBESITY (MULTI): ICD-10-CM

## 2025-01-15 DIAGNOSIS — Z09 HOSPITAL DISCHARGE FOLLOW-UP: ICD-10-CM

## 2025-01-15 DIAGNOSIS — I10 PRIMARY HYPERTENSION: Primary | ICD-10-CM

## 2025-01-15 PROCEDURE — 99214 OFFICE O/P EST MOD 30 MIN: CPT

## 2025-01-15 PROCEDURE — 3074F SYST BP LT 130 MM HG: CPT

## 2025-01-15 PROCEDURE — 3008F BODY MASS INDEX DOCD: CPT

## 2025-01-15 PROCEDURE — 1036F TOBACCO NON-USER: CPT

## 2025-01-15 PROCEDURE — 4010F ACE/ARB THERAPY RXD/TAKEN: CPT

## 2025-01-15 PROCEDURE — 3079F DIAST BP 80-89 MM HG: CPT

## 2025-01-15 RX ORDER — HYDROCHLOROTHIAZIDE 12.5 MG/1
12.5 TABLET ORAL DAILY
Qty: 90 TABLET | Refills: 1 | Status: SHIPPED | OUTPATIENT
Start: 2025-01-15 | End: 2025-07-14

## 2025-01-15 ASSESSMENT — ENCOUNTER SYMPTOMS
OCCASIONAL FEELINGS OF UNSTEADINESS: 0
DEPRESSION: 0
LOSS OF SENSATION IN FEET: 0

## 2025-01-15 ASSESSMENT — PATIENT HEALTH QUESTIONNAIRE - PHQ9
SUM OF ALL RESPONSES TO PHQ9 QUESTIONS 1 AND 2: 0
2. FEELING DOWN, DEPRESSED OR HOPELESS: NOT AT ALL
1. LITTLE INTEREST OR PLEASURE IN DOING THINGS: NOT AT ALL

## 2025-01-15 ASSESSMENT — PAIN SCALES - GENERAL: PAINLEVEL_OUTOF10: 0-NO PAIN

## 2025-01-15 NOTE — PROGRESS NOTES
"Subjective   Patient ID: Vilma Grover is a 54 y.o. female who presents for hospital follow up HTN    HPI   Dinnertime patient reportedly close emergency room noting on the eighth her blood pressure was over 200 systolically.  At that time she is currently waiting for prescription refill.  Patient states her doctor told her to come to the ER if her BP was over 150/90 and it was 197/110 also complains of shortness of breath chest pain and dizziness at that time.  Per the ER doctors note she complained not feeling well and intermittent spasms in her chest and not feeling well times a few days.  Admitted to increased amounts of stress in her life when taking care of her mother who has dementia.  She denied any URI symptoms abdominal pain nausea vomiting diarrhea swelling increased weight gain.  She mentioned at that time that she had a workup similar in the past and she was found to have reflux but this seems \"different\" blood pressure had improved on arrival to the emergency department she is on losartan twice a day and takes it as directed.  On arrival to the emergency room she did not have any chest pain.  On the monitor she was in sinus rhythm with a heart rate in the 70s blood pressure was 135/94.  She is given a prescription for albuterol and then Carafate to help with her reflux.  He was advised to follow-up with primary care    HTN  150/115s she forgot the bp cuff and log today.   Does not have blood pressure cuff with her today  Denies eating fast food, fried food, lunch meat, processed foods. Added table salt. \"I have been doing better\"   Does not  exercise  Works from home   Taking Valsartan 320mg daily    Cardiology   Was seeing Dr. Angel- he is now with [facility]. Transferring to Dr. Miller- in February.       Review of Systems  Review of Systems negative except as noted in HPI and Chief complaint.    Current Outpatient Medications:   •  acetaminophen (Tylenol) 500 mg tablet, Take 2 tablets (1,000 mg) by " mouth every 6 hours if needed for mild pain (1 - 3)., Disp: , Rfl:   •  albuterol 90 mcg/actuation inhaler, Inhale 2 puffs every 4 hours if needed for wheezing or shortness of breath. Q4h while awake x 4 days then prn, please add spacer, Disp: 18 g, Rfl: 0  •  aspirin ([place] Aspirin) 81 mg EC tablet, Take 1 tablet (81 mg) by mouth once daily., Disp: , Rfl:   •  atorvastatin (Lipitor) 20 mg tablet, Take 1 tablet (20 mg) by mouth once daily., Disp: , Rfl:   •  calcium carb-mag ox-zinc gluc 333-133-5 mg tablet, Take 1 tablet by mouth once daily at bedtime., Disp: , Rfl:   •  cholecalciferol (Vitamin D3) 50 MCG (2000 UT) tablet, Take 1 tablet (50 mcg) by mouth once daily at bedtime., Disp: , Rfl:   •  citalopram (CeleXA) 40 mg tablet, TAKE ONE TABLET BY MOUTH EVERY DAY, Disp: 30 tablet, Rfl: 5  •  cyanocobalamin (Vitamin B-12) 100 mcg tablet, Take 5 tablets (500 mcg) by mouth once daily. For 30 days, Disp: , Rfl:   •  dulaglutide 3 mg/0.5 mL pen injector, Inject 3 mg under the skin 1 (one) time per week., Disp: 2 mL, Rfl: 11  •  ferrous sulfate 325 (65 Fe) MG EC tablet, Take 65 mg by mouth 3 times daily (morning, midday, late afternoon). Do not crush, chew, or split., Disp: , Rfl:   •  lancets misc, Use to monitor blood glucose once daily, Disp: 100 each, Rfl: 1  •  metFORMIN  mg 24 hr tablet, Take 2 tablets (1,000 mg) by mouth once daily in the evening. Take with meals., Disp: 180 tablet, Rfl: 1  •  MULTIVITAMIN ORAL, Take 1 tablet by mouth once daily. For 30 days, Disp: , Rfl:   •  nystatin (Mycostatin) cream, Apply 1 Application topically 2 times a day as needed (rash/irritation)., Disp: , Rfl:   •  OneTouch Ultra Test strip, 1 each once daily., Disp: 100 each, Rfl: 1  •  sucralfate (Carafate) 1 gram tablet, Take 1 tablet (1 g) by mouth 4 times a day before meals for 5 days., Disp: 20 tablet, Rfl: 0  •  valsartan (Diovan) 320 mg tablet, Take 1 tablet (320 mg) by mouth once daily., Disp: 90 tablet, Rfl:  1  •  hydroCHLOROthiazide (Microzide) 12.5 mg tablet, Take 1 tablet (12.5 mg) by mouth once daily., Disp: 90 tablet, Rfl: 1    Review of Systems negative except as noted in HPI and Chief complaint.    Current Outpatient Medications:   •  acetaminophen (Tylenol) 500 mg tablet, Take 2 tablets (1,000 mg) by mouth every 6 hours if needed for mild pain (1 - 3)., Disp: , Rfl:   •  albuterol 90 mcg/actuation inhaler, Inhale 2 puffs every 4 hours if needed for wheezing or shortness of breath. Q4h while awake x 4 days then prn, please add spacer, Disp: 18 g, Rfl: 0  •  aspirin ([place] Aspirin) 81 mg EC tablet, Take 1 tablet (81 mg) by mouth once daily., Disp: , Rfl:   •  atorvastatin (Lipitor) 20 mg tablet, Take 1 tablet (20 mg) by mouth once daily., Disp: , Rfl:   •  calcium carb-mag ox-zinc gluc 333-133-5 mg tablet, Take 1 tablet by mouth once daily at bedtime., Disp: , Rfl:   •  cholecalciferol (Vitamin D3) 50 MCG (2000 UT) tablet, Take 1 tablet (50 mcg) by mouth once daily at bedtime., Disp: , Rfl:   •  citalopram (CeleXA) 40 mg tablet, TAKE ONE TABLET BY MOUTH EVERY DAY, Disp: 30 tablet, Rfl: 5  •  cyanocobalamin (Vitamin B-12) 100 mcg tablet, Take 5 tablets (500 mcg) by mouth once daily. For 30 days, Disp: , Rfl:   •  dulaglutide 3 mg/0.5 mL pen injector, Inject 3 mg under the skin 1 (one) time per week., Disp: 2 mL, Rfl: 11  •  ferrous sulfate 325 (65 Fe) MG EC tablet, Take 65 mg by mouth 3 times daily (morning, midday, late afternoon). Do not crush, chew, or split., Disp: , Rfl:   •  lancets misc, Use to monitor blood glucose once daily, Disp: 100 each, Rfl: 1  •  metFORMIN  mg 24 hr tablet, Take 2 tablets (1,000 mg) by mouth once daily in the evening. Take with meals., Disp: 180 tablet, Rfl: 1  •  MULTIVITAMIN ORAL, Take 1 tablet by mouth once daily. For 30 days, Disp: , Rfl:   •  nystatin (Mycostatin) cream, Apply 1 Application topically 2 times a day as needed (rash/irritation)., Disp: , Rfl:   •   "OneTouch Ultra Test strip, 1 each once daily., Disp: 100 each, Rfl: 1  •  sucralfate (Carafate) 1 gram tablet, Take 1 tablet (1 g) by mouth 4 times a day before meals for 5 days., Disp: 20 tablet, Rfl: 0  •  valsartan (Diovan) 320 mg tablet, Take 1 tablet (320 mg) by mouth once daily., Disp: 90 tablet, Rfl: 1  •  hydroCHLOROthiazide (Microzide) 12.5 mg tablet, Take 1 tablet (12.5 mg) by mouth once daily., Disp: 90 tablet, Rfl: 1    Objective   /80 (BP Location: Left arm, Patient Position: Sitting, BP Cuff Size: Adult)   Pulse 75   Resp 16   Ht 1.626 m (5' 4\")   Wt 142 kg (314 lb)   SpO2 98%   BMI 53.90 kg/m²     Physical Exam  Vitals reviewed.   Cardiovascular:      Rate and Rhythm: Normal rate.   Musculoskeletal:      Cervical back: Normal range of motion.   Neurological:      General: No focal deficit present.      Mental Status: She is alert.   Psychiatric:         Mood and Affect: Mood normal.     Assessment/Plan   Diagnoses and all orders for this visit:  Primary hypertension  -     hydroCHLOROthiazide (Microzide) 12.5 mg tablet; Take 1 tablet (12.5 mg) by mouth once daily.  Type 2 diabetes mellitus with obesity (Multi)  Hospital discharge follow-up    HYPERTENSION: Continue Valsartan, you will call the office today to inform us of the dose as there was confusing in the office about the dose.   Begin hydrochlorothiazide  once daily, take this first thing in the AM- rise slowly when getting up to make sure you are not lightheaded or dizzy.   Decrease intake of processed foods, fast foods, lunch meat, canned soups, canned veggies.  Increase intake of fresh fruits, veggies, and lean meats. Monitor blood pressure at home, keep a log and bring this with you to your next appointment. Call the office if your blood pressure runs 150/90 or higher consistently.  Blood Pressure Technique:  Sit quietly in a chair for 5 minutes with back supported and feet on the floor  Then place left arm on a table or armrest " so bicep area is at the same level of heart or left breast  Check three times in a row, disregard the highest reading and average the other two    This note was dictated using DRAGON speech recognition software and was corrected for spelling or grammatical errors, but despite proofreading several typographical errors might be present that might affect the meaning of the content.  Rachel Savage, CNP    Time Spent  Prep time on day of patient encounter: 5 minutes  Time spent directly with patient, family or caregiver: 20 minutes  Documentation Time: 5 minutes

## 2025-01-15 NOTE — TELEPHONE ENCOUNTER
Patient called and said that the blood pressure medication is Valsartan 320 mg and Dr. Vallabehini increased her dose about a year ago she said to take 1 tablet po bid so that is how she has been taking it. I did not change anything in her medication list in case you want to change anything.

## 2025-01-15 NOTE — PATIENT INSTRUCTIONS
HYPERTENSION: Continue Valsartan, you will call the office today to inform us of the dose as there was confusing in the office about the dose.   Begin hydrochlorothiazide  once daily, take this first thing in the AM- rise slowly when getting up to make sure you are not lightheaded or dizzy.   Decrease intake of processed foods, fast foods, lunch meat, canned soups, canned veggies.  Increase intake of fresh fruits, veggies, and lean meats. Monitor blood pressure at home, keep a log and bring this with you to your next appointment. Call the office if your blood pressure runs 150/90 or higher consistently.  Blood Pressure Technique:  Sit quietly in a chair for 5 minutes with back supported and feet on the floor  Then place left arm on a table or armrest so bicep area is at the same level of heart or left breast  Check three times in a row, disregard the highest reading and average the other two

## 2025-01-21 ENCOUNTER — APPOINTMENT (OUTPATIENT)
Dept: OCCUPATIONAL THERAPY | Facility: CLINIC | Age: 55
End: 2025-01-21
Payer: COMMERCIAL

## 2025-01-24 ENCOUNTER — TREATMENT (OUTPATIENT)
Dept: OCCUPATIONAL THERAPY | Facility: CLINIC | Age: 55
End: 2025-01-24
Payer: COMMERCIAL

## 2025-01-24 DIAGNOSIS — S59.911A RIGHT FOREARM INJURY: ICD-10-CM
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
## 2025-01-24 DIAGNOSIS — S56.901D: ICD-10-CM

## 2025-01-24 PROCEDURE — 97112 NEUROMUSCULAR REEDUCATION: CPT | Mod: GO | Performed by: OCCUPATIONAL THERAPIST

## 2025-01-24 PROCEDURE — 97110 THERAPEUTIC EXERCISES: CPT | Mod: GO | Performed by: OCCUPATIONAL THERAPIST

## 2025-01-24 NOTE — PROGRESS NOTES
"  Occupational Therapy Orthopedic Treatment note   Patient Name: Vilma Grover  MRN: [id number]  Today's Date: 1/30/2025  Time Calculation  Start Time: 0300  Stop Time: 0340  Time Calculation (min): 40 min   , OT Therapeutic Procedures Time Entry  Neuromuscular Re-Education Time Entry: 12  Therapeutic Exercise Time Entry: 26,      Insurance:  Visit number: 12 of 22  Authorization info:  none required    Current Problem  1. Dog bite, subsequent encounter        2. Injury of forearm muscle or tendon, right, subsequent encounter        3. Right forearm injury  Follow Up In Occupational Therapy            Referred by:  Dr. Gregorio    Subjective   \"I saw my doctor and Im doing well he thinks and I dont need to wear the splint anymore    RICKY: Dog bite  DOI: 6/2/24  DOS: 10/16/24    FCR to EDC  RF FDS to EPL  Walk in Patient? Yes  Work Related Injury? No    Hand dominance: Right  Effected extremity: Left    Medical History Form: Reviewed (scanned into chart)  Prior Level of Function (PLOF)  Patient previously mod I with all ADLs/IADLs    ADL/IADL Deficits:  Bathing, Dressing, Hygiene/Grooming, Hair care, Sleep, Home mgt, Meal prep, and Work      Precautions:     Tendon transfer protocol      Pain:   1/10  Location: L radial wrist surgical site and 1sst web space  Description: pulling and throbbing  Aggravating Factors:  n/a  Relieving Factors:  Rest and Elevation      Patients Living Environment: Reviewed and no concern  Lives with: Alone  Primary Language: English  Patient's Goal(s) for Therapy: \"to get movement back into my hand\"      Objective     LEFT HAND   (ext/flex)   MCP PIP DIP DPC   IF     0   MF    0   RF    0   SF    0   Thumb       Thumb RABD       Thumb PABD         WRIST AROM (Degrees)   R L   Extension  45 A   Flexion  10 A       R hand figure 8:  42.5cm  L hand figure 8:  41.1cm      Edema:  -  min    Sensation:  - intact light touch  - hypersensitivity  - paraesthesias    Performance Impacted:   - ROM   - " Joint mobility   - Strength   - Sensibility   - Pain   - Wound   - Scar    Outcome Measures:  Quick DASH:             TODAY'S TREATMENT    Neuro re-ed:  - AROM tendon gliding within fluidotherapy for desensitization of all surgical sites and re-ed of composite fist proprioception x 12 min    Therapeutic ex:  - full composite fist flexion and extension in neutral x 25rps  - wrist ext/flex with full finger flexion x 25 rps  - grn, blue, black clothespins 3pt and lateral pinch for strength   - green flexbar sup/pron to increase  and wrist strength 3x10  - isolated EDC to increase IPJ ROM x 50 rps  - green puttycise large knob to increase  strength and wrist strength x 5 min      EDUCATION: Risk/benefits discussed, Home exercise program, orthosis wear schedule/ purpose/ precautions, care of orthosis, wound care, edema control, activity modifications, joint protection, pain/symptom management, injury pathology, and plan of care,          Assessment:   Continues to tolerate resistive ex quite well.  Did not report moderate fatigue as she typically would which is good indication of improving strength.  Now that patient's splint has been discharged altogether her primary focus will be  and wrist strengthening a majority of the time.      Level of Complexity  MOD complexity    OT Rehab Potential  Excellent      Plan:  Planned Interventions include: therapeutic exercise, self-care home management, manual therapy, therapeutic activities, , neuromuscular coordination, vasopneumatic, dry needling, and orthosis fabrication.  Frequency: 2 x Week  Duration: 12 Weeks  Goals: Set and discussed today      Goals - Patient will:       Goal 1) verbalize/demonstrate/understanding of diagnosis and precautions       Goal 2) verbalize purpose of orthosis, wearing schedule, care and precautions       Goal 3) don/doff orthosis correctly and independently       Goal 4) verbalize/demonstrate home program, activity modification and/or  adaptive equipment    All goals set today and have been met.    Plan of care was developed with input and agreement by the patient      Katelynn Chamberlain OT

## 2025-01-30 ENCOUNTER — TELEPHONE (OUTPATIENT)
Dept: PRIMARY CARE | Facility: CLINIC | Age: 55
End: 2025-01-30
Payer: COMMERCIAL

## 2025-01-30 DIAGNOSIS — Z12.11 COLON CANCER SCREENING: Primary | ICD-10-CM

## 2025-01-30 NOTE — TELEPHONE ENCOUNTER
Patient called and was wondering if you had put a referral in for gastro? I don't see one in there. She did go to [facility] for her colonoscopy in the past so I gave her that number to call. Once referral is placed I will fax it to their office.

## 2025-02-05 ENCOUNTER — OFFICE VISIT (OUTPATIENT)
Dept: OBSTETRICS AND GYNECOLOGY | Facility: CLINIC | Age: 55
End: 2025-02-05
Payer: COMMERCIAL

## 2025-02-05 VITALS
HEIGHT: 64 IN | BODY MASS INDEX: 50.02 KG/M2 | HEART RATE: 80 BPM | DIASTOLIC BLOOD PRESSURE: 78 MMHG | WEIGHT: 293 LBS | SYSTOLIC BLOOD PRESSURE: 124 MMHG

## 2025-02-05 DIAGNOSIS — E66.01 MORBID OBESITY WITH BMI OF 50.0-59.9, ADULT (MULTI): ICD-10-CM

## 2025-02-05 DIAGNOSIS — N39.3 STRESS INCONTINENCE IN FEMALE: ICD-10-CM

## 2025-02-05 DIAGNOSIS — N39.41 URGE URINARY INCONTINENCE: Primary | ICD-10-CM

## 2025-02-05 DIAGNOSIS — I10 HYPERTENSION, UNSPECIFIED TYPE: ICD-10-CM

## 2025-02-05 LAB
POC APPEARANCE, URINE: CLEAR
POC BILIRUBIN, URINE: NEGATIVE
POC BLOOD, URINE: NEGATIVE
POC COLOR, URINE: NORMAL
POC GLUCOSE, URINE: NEGATIVE MG/DL
POC KETONES, URINE: NEGATIVE MG/DL
POC LEUKOCYTES, URINE: NEGATIVE
POC NITRITE,URINE: NEGATIVE
POC PH, URINE: 7.5 PH
POC PROTEIN, URINE: NEGATIVE MG/DL
POC SPECIFIC GRAVITY, URINE: 1.02
POC UROBILINOGEN, URINE: 0.2 EU/DL

## 2025-02-05 PROCEDURE — 81003 URINALYSIS AUTO W/O SCOPE: CPT | Performed by: OBSTETRICS & GYNECOLOGY

## 2025-02-05 PROCEDURE — 3078F DIAST BP <80 MM HG: CPT | Performed by: OBSTETRICS & GYNECOLOGY

## 2025-02-05 PROCEDURE — 4010F ACE/ARB THERAPY RXD/TAKEN: CPT | Performed by: OBSTETRICS & GYNECOLOGY

## 2025-02-05 PROCEDURE — 3008F BODY MASS INDEX DOCD: CPT | Performed by: OBSTETRICS & GYNECOLOGY

## 2025-02-05 PROCEDURE — G2211 COMPLEX E/M VISIT ADD ON: HCPCS | Performed by: OBSTETRICS & GYNECOLOGY

## 2025-02-05 PROCEDURE — 3074F SYST BP LT 130 MM HG: CPT | Performed by: OBSTETRICS & GYNECOLOGY

## 2025-02-05 PROCEDURE — 1036F TOBACCO NON-USER: CPT | Performed by: OBSTETRICS & GYNECOLOGY

## 2025-02-05 PROCEDURE — 99214 OFFICE O/P EST MOD 30 MIN: CPT | Mod: GC | Performed by: OBSTETRICS & GYNECOLOGY

## 2025-02-05 PROCEDURE — 99214 OFFICE O/P EST MOD 30 MIN: CPT | Performed by: OBSTETRICS & GYNECOLOGY

## 2025-02-05 RX ORDER — VIBEGRON 75 MG/1
75 TABLET, FILM COATED ORAL DAILY
Qty: 30 TABLET | Refills: 11 | Status: SHIPPED | OUTPATIENT
Start: 2025-02-05

## 2025-02-05 ASSESSMENT — PAIN SCALES - GENERAL: PAINLEVEL_OUTOF10: 0-NO PAIN

## 2025-02-05 NOTE — PROGRESS NOTES
Referred by: self     PCP  АЛЕКСАНДР Alves-CNP         CHIEF COMPLAINT:  urinary incontinence         HISTORY OF PRESENT ILLNESS:  This is a  54 y.o. y.o. female who presents with mixed incontinence    The following were reviewed to gain additional history:  External notes: Dr. No 5/2024 - irregular menses, fibroid uterus  Test results: HgbA1c 6.9  Pelvic US 5/2024:  UTERUS:  The uterus measures  7.4 cm x 5.1 cm x 11.6 cm. There is a fundal  fibroid measuring up to 4.7 x 3.8 x 3.6 cm. Previously suspected  lipoleiomyoma is not well seen on the current exam. Incidentally  noted nabothian cysts.      ENDOMETRIUM:  The endometrium measures a thickness of 1.0 cm, which is normal.      RIGHT ADNEXA:  The right ovary measures 2.4 cm x 1.4 cm x 2.8 cm and demonstrates  normal flow. No gross right adnexal masses are seen, no hydrosalpinx.      LEFT ADNEXA:  The left ovary measures 2.2 cm x 2.1 cm x 3.8 cm and demonstrates  normal flow. No gross left adnexal masses are seen, no hydrosalpinx.      CUL DE SAC:  No gross free fluid is seen in the pelvic cul-de-sac.      IMPRESSION:  1. Fundal fibroid measuring up to 4.7 x 3.8 x 3.6 cm.  2. 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         Specifically, she describes the following pelvic floor symptoms:          Prolapse: No       - Splinting to urinate: No       - Splinting for bowel movement/stool trapping: No              Incontinence:  Yes             Mixed - thinks urgency happens more often, wears 2 pads a day              Urinary Symptoms:       - Frequency:  No             # Voids:  4       - Nocturia: Yes             # Voids:  1-2       - Urgency:  Yes       - Incomplete emptying:  No       - Hesitancy:  No       - Pain with voiding:  No       - Excessive fluid intake: coffee x6 cups, water x48oz, limited soda               History:       - Recurrent UTI:  No       - Hematuria:  No       - Stones:  " No       - Kidney Disease:  No                  Bowel Symptoms:       - Regular: Yes       - Diarrhea:No       - Constipation: No       - Fecal Incontinence:  No       - Flatus Incontinence:  No       - Fecal urgency:   No    Past medical and surgical hx reviewed - pertinent for   PMH obesity, HTN, T2DM (HgbA1c 6.9)  PSH  x3, appendectomy  Smoking history: former, quit         Gyn History:  - Postmenopause: No - but perimenopause, irregular menses but hasn't gone 12 months without a period  - HRT: No  - Pap up to date: Yes - 10/2023   History of abnormal pap: No  - Sexually active:  Yes - very rarely  Dyspareunia: No   Other issues: no  - Number of prior vaginal deliveries: 0   Number of prior operative deliveries: 0   Prior OASI? no  - Number of prior c-sections: 3    - Mammogram up to date: Yes  - Colonoscopy up to date: Yes - due next year due to having polyps in the past    OB History          3    Para   3    Term   2       1    AB   0    Living   2         SAB   0    IAB   0    Ectopic   0    Multiple   0    Live Births   2                       PHYSICAL EXAMINATION:  No LMP recorded. Patient is perimenopausal.  Body mass index is 53.62 kg/m².  /78 (Patient Position: Sitting)   Pulse 80   Ht 1.626 m (5' 4\")   Wt 142 kg (312 lb 6.4 oz)   BMI 53.62 kg/m²   General Appearance: well appearing  Neuro: Alert and oriented   HEENT: mucous membranes moist, neck supple  Resp: No respiratory distress, normal work of breathing  MSK: normal range of motion, gait appropriate    Pelvic:  Genitourinary: normal external genitalia, Bartholin's glands negative, Gilman's glands negative  Urethra: normal meatus, non-tender, no periurethral mass  Vaginal mucosa normal  Cervix normal  Uterus  normal size, non-tender, mobile  Adnexae negative nontender, no masses  Atrophy negative    CST positive  Pelvic floor muscle contraction  2/5    POP-Q (in supine position):       Aa -2     Ba -2     C -8   "            gh 3     pb 3     tvl 10              Ap -3     Bp -3     D -9    Rectal: no hemorrhoids, fissures or masses    PVR (by Ultrasound): 11 mL         IMPRESSION AND PLAN:  Vilma Grover is a 54 y.o. who presents with mixed urinary incontinence, HTN, obesity    Mixed urinary incontinence  - reviewed the difference between stress and urge incontinence  - reviewed treatment options for urge incontinence including behavioral modifications, weight loss, PFPT and oral medications  - patient has hx of HTN on 2 medications- avoid mirabegron, will try Gemtesa 75 mg daily  - if Gemtesa not covered, will send rx for Vesicare 10 mg daily  - +CST in the office today  - reviewed options for treatment of SYLVIA including weight loss, PFPT, incontinence ring, mid-urethral sling and urethral bulking  - patient would like to focus on weight loss and PFPT before any surgical procedure    Obesity  - Discussed that 8% body weight reduction can reduce bladder symptoms by 50%.   - she is going to work on this with her PCP    All questions and concerns were answered and addressed.  The patient expressed understanding and agrees with the plan.     RTC in 1 month for med check. PFPT referral placed and list of locations given.    Patient seen and discussed with Dr. Marina Lei MD  Urogynecology and Reconstructive Pelvic Surgery Fellow  2/5/2025 9:32 AM    I saw and evaluated the patient. I personally obtained the key and critical portions of the history and physical exam or was physically present for key and critical portions performed by the resident/fellow. I reviewed the resident/fellow's documentation and discussed the patient with the resident/fellow. I agree with the resident/fellow's medical decision making as documented in the note.    45 minutes was spent in face to face and non face to face time in this encounter    Julianne Gray MD MPH

## 2025-02-06 ENCOUNTER — OFFICE VISIT (OUTPATIENT)
Dept: CARDIOLOGY | Facility: CLINIC | Age: 55
End: 2025-02-06
Payer: COMMERCIAL

## 2025-02-06 VITALS
DIASTOLIC BLOOD PRESSURE: 88 MMHG | BODY MASS INDEX: 53.55 KG/M2 | OXYGEN SATURATION: 98 % | HEART RATE: 72 BPM | SYSTOLIC BLOOD PRESSURE: 138 MMHG | WEIGHT: 293 LBS

## 2025-02-06 DIAGNOSIS — E78.2 MIXED HYPERLIPIDEMIA: Primary | ICD-10-CM

## 2025-02-06 DIAGNOSIS — E78.00 HYPERCHOLESTEREMIA: ICD-10-CM

## 2025-02-06 DIAGNOSIS — R07.89 OTHER CHEST PAIN: ICD-10-CM

## 2025-02-06 DIAGNOSIS — I10 PRIMARY HYPERTENSION: ICD-10-CM

## 2025-02-06 PROCEDURE — 3075F SYST BP GE 130 - 139MM HG: CPT | Performed by: INTERNAL MEDICINE

## 2025-02-06 PROCEDURE — 99214 OFFICE O/P EST MOD 30 MIN: CPT | Performed by: INTERNAL MEDICINE

## 2025-02-06 PROCEDURE — 99204 OFFICE O/P NEW MOD 45 MIN: CPT | Performed by: INTERNAL MEDICINE

## 2025-02-06 PROCEDURE — 3079F DIAST BP 80-89 MM HG: CPT | Performed by: INTERNAL MEDICINE

## 2025-02-06 PROCEDURE — 4010F ACE/ARB THERAPY RXD/TAKEN: CPT | Performed by: INTERNAL MEDICINE

## 2025-02-06 PROCEDURE — 1036F TOBACCO NON-USER: CPT | Performed by: INTERNAL MEDICINE

## 2025-02-06 RX ORDER — HYDROCHLOROTHIAZIDE 12.5 MG/1
25 TABLET ORAL DAILY
Qty: 90 TABLET | Refills: 1 | Status: SHIPPED | OUTPATIENT
Start: 2025-02-06 | End: 2025-08-05

## 2025-02-06 RX ORDER — ATORVASTATIN CALCIUM 20 MG/1
20 TABLET, FILM COATED ORAL DAILY
Qty: 90 TABLET | Refills: 3 | Status: SHIPPED | OUTPATIENT
Start: 2025-02-06 | End: 2026-02-06

## 2025-02-06 ASSESSMENT — ENCOUNTER SYMPTOMS
PND: 0
DIAPHORESIS: 0
OCCASIONAL FEELINGS OF UNSTEADINESS: 1
SYNCOPE: 0
DYSPNEA ON EXERTION: 0
SHORTNESS OF BREATH: 0
IRREGULAR HEARTBEAT: 0
CLAUDICATION: 0
DEPRESSION: 0
WEIGHT GAIN: 0
LOSS OF SENSATION IN FEET: 0
PALPITATIONS: 0
WHEEZING: 0
FEVER: 0
MYALGIAS: 0
DIZZINESS: 0
NEAR-SYNCOPE: 0
WEAKNESS: 0
WEIGHT LOSS: 0
ORTHOPNEA: 0
COUGH: 0

## 2025-02-06 ASSESSMENT — PAIN SCALES - GENERAL: PAINLEVEL_OUTOF10: 0-NO PAIN

## 2025-02-06 ASSESSMENT — LIFESTYLE VARIABLES: TOTAL SCORE: 0

## 2025-02-06 NOTE — ASSESSMENT & PLAN NOTE
Uncontrolled. Increase hydrochlorothiazide to 25mg every day. Advised to continue to check at home. BMP

## 2025-02-06 NOTE — ASSESSMENT & PLAN NOTE
ER told her likely GI. She has uncontrolled risk factors and chest pain with typical and atypical features. I feel that a stress test for further differentiation and risk stratification would be appropriate.

## 2025-02-06 NOTE — PROGRESS NOTES
Subjective      Chief Complaint   Patient presents with    dr baker referred for blood pressure        54-year-old female with history of hypertension, diabetes mellitus type 2, hyperlipidemia who presents for cardiac evaluation.  She was seen in the emergency room earlier last month where she had presented for chest discomfort.  She had attested to home blood pressures in 200s however on presentation she was normotensive, 135/94 her EKG showed sinus rhythm without any concerning ST-T wave abnormalities.  High-sensitivity troponins were normal as was her BNP.            Review of Systems   Constitutional: Negative for diaphoresis, fever, weight gain and weight loss.   Eyes:  Negative for visual disturbance.   Cardiovascular:  Negative for chest pain, claudication, dyspnea on exertion, irregular heartbeat, leg swelling, near-syncope, orthopnea, palpitations, paroxysmal nocturnal dyspnea and syncope.   Respiratory:  Negative for cough, shortness of breath and wheezing.    Musculoskeletal:  Negative for muscle weakness and myalgias.   Neurological:  Negative for dizziness and weakness.   All other systems reviewed and are negative.       Past Medical History:   Diagnosis Date    Abnormal Pap smear of cervix     Anxiety     Arthritis     Colon polyps     Depression     Diabetes (Multi)     High cholesterol     Hypertension     Obesity     Scoliosis     Sleep apnea     Urinary tract infection     Varicella         Past Surgical History:   Procedure Laterality Date    APPENDECTOMY      BREAST BIOPSY Right     2018 right stereo bx benign through ccl     SECTION, CLASSIC       Section     SECTION, CLASSIC       SECTION, CLASSIC      baby passed away    CONDYLOMA EXCISION/FULGURATION      OTHER SURGICAL HISTORY  10/23/2014    Bunion Correction By [place] Procedure        Social History     Socioeconomic History    Marital status:      Spouse name: Not on file    Number  of children: Not on file    Years of education: Not on file    Highest education level: Not on file   Occupational History    Not on file   Tobacco Use    Smoking status: Former     Current packs/day: 0.00     Average packs/day: 1 pack/day for 20.0 years (20.0 ttl pk-yrs)     Types: Cigarettes     Start date:      Quit date:      Years since quittin.1     Passive exposure: Past    Smokeless tobacco: Never    Tobacco comments:     Quit 2018   Vaping Use    Vaping status: Never Used   Substance and Sexual Activity    Alcohol use: Yes     Alcohol/week: 2.0 standard drinks of alcohol     Types: 2 Glasses of wine per week     Comment: Drink occasionally    Drug use: Never    Sexual activity: Yes     Partners: Male     Birth control/protection: None   Other Topics Concern    Not on file   Social History Narrative    Not on file     Social Drivers of Health     Financial Resource Strain: Low Risk  (6/3/2024)    Overall Financial Resource Strain (CARDIA)     Difficulty of Paying Living Expenses: Not hard at all   Food Insecurity: Food Insecurity Present (2020)    Received from [facility]    Hunger Vital Sign     Worried About Running Out of Food in the Last Year: Sometimes true     Ran Out of Food in the Last Year: Sometimes true   Transportation Needs: No Transportation Needs (2024)    OASIS : Transportation     Lack of Transportation (Medical): No     Lack of Transportation (Non-Medical): No     Patient Unable or Declines to Respond: No   Physical Activity: Unknown (2/15/2023)    Received from [facility]    Exercise Vital Sign     Days of Exercise per Week: Patient declined     Minutes of Exercise per Session: Patient declined   Stress: Stress Concern Present (2/15/2023)    Received from [facility]    [place] [place] [employer] - Occupational Stress Questionnaire     Feeling of Stress : Rather much   Social  Connections: Feeling Socially Integrated (6/27/2024)    OASIS : Social Isolation     Frequency of experiencing loneliness or isolation: Never   Intimate Partner Violence: Not on file   Housing Stability: Unknown (6/3/2024)    Housing Stability Vital Sign     Unable to Pay for Housing in the Last Year: No     Number of Places Lived in the Last Year: Not on file     Unstable Housing in the Last Year: No        Family History   Problem Relation Name Age of Onset    Dementia Mother Fely     Coronary artery disease Mother Fely     Heart attack Mother Fely     Other (Uterine leiomyoma) Mother Fely     Arthritis Mother Fely     Depression Mother Fely     Hearing loss Mother Fely     Prostate cancer Father Chato         in remission heart vave replacment in 2/2023    Atrial fibrillation Father Chato     Anxiety disorder Brother      Depression Brother      Migraines Daughter      Other (bruises easily) Daughter          OBJECTIVE:    Vitals:    02/06/25 1039   BP: 138/88   Pulse: 72   SpO2: 98%        Vitals reviewed.   Constitutional:       Appearance: Normal and healthy appearance. Not in distress.   Pulmonary:      Effort: Pulmonary effort is normal.      Breath sounds: Normal breath sounds.   Cardiovascular:      Normal rate. Regular rhythm. Normal S1. Normal S2.       Murmurs: There is no murmur.      No gallop.  No click.   Pulses:     Intact distal pulses.   Edema:     Peripheral edema absent.   Skin:     General: Skin is warm and dry.   Neurological:      General: No focal deficit present.          Lab Review:   Lab Results   Component Value Date     01/10/2025    K 4.6 01/10/2025     01/10/2025    CO2 27 01/10/2025    BUN 11 01/10/2025    CREATININE 0.67 01/10/2025    GLUCOSE 100 (H) 01/10/2025    CALCIUM 8.9 01/10/2025     Lab Results   Component Value Date    CHOL 156 08/28/2024    TRIG 101 08/28/2024    HDL 48.0 (L) 08/28/2024       Lab Results    Component Value Date    LDLCALC 88 08/28/2024        Other chest pain  ER told her likely GI. She has uncontrolled risk factors and chest pain with typical and atypical features. I feel that a stress test for further differentiation and risk stratification would be appropriate.    Hypertension  Uncontrolled. Increase hydrochlorothiazide to 25mg every day. Advised to continue to check at home. BMP    Hypercholesteremia  Lifestyle modifications were discussed. I advocated for mediterranean and plant based eating. We also discussed exercise. 150 minutes a week of moderate intensity exercise is recommended per our ACC/AHA guidelines

## 2025-02-06 NOTE — ASSESSMENT & PLAN NOTE
Lifestyle modifications were discussed. I advocated for mediterranean and plant based eating. We also discussed exercise. 150 minutes a week of moderate intensity exercise is recommended per our ACC/AHA guidelines

## 2025-02-10 ENCOUNTER — DOCUMENTATION (OUTPATIENT)
Dept: PHYSICAL THERAPY | Facility: CLINIC | Age: 55
End: 2025-02-10
Payer: COMMERCIAL

## 2025-02-10 NOTE — PROGRESS NOTES
Physical Therapy    Discharge Summary    Name: Vilma Grover  MRN: [id number]  Date: 2/10/2025    Discharge Information:   Date of discharge 2/10/25  Date of last visit 12/19/24  Date of evaluation 12/13/24  Number of attended visits 2  Referred by Dr. Mendenhall  Referred for Vertigo/BPPV     Therapy Summary: Pt only attended one follow up session with a reduction in symptoms, did not continue with therapy, discharged this date.     Discharge Reason(s): Did not schedule additional appointments

## 2025-02-12 NOTE — PROGRESS NOTES
[facility]  Hand and Upper Extremity Service  Follow up visit         Follow up for: Left forearm     Interval History: She returns for her left forearm.               Past medical history, medications, allergies, surgical history and review of systems are reviewed and otherwise unchanged when compared to last visit on 1/6/25         Examination:  Constitutional: Oriented to person, place, and time.  Appears well-developed and well-nourished.  Head: Normocephalic and atraumatic.  Eyes: Pupils are equal, round, and reactive to light.  Cardiovascular: Intact distal pulses.  Pulmonary/Chest/Breast: Effort normal. No respiratory distress.  Neurological: Alert and oriented to person, place, and time.  Skin: Skin is warm and dry.  Psychiatric: normal mood and affect.  Behavior is normal.  Musculoskeletal: Left forearm reveals       Personal Interpretation of Diagnostic studies:        Impression: Sequela of left forearm dog bite injury       Plan:       In Office Procedures Performed:       Follow up:              Talon Gregorio MD  [facility]  Department of Orthopaedic Surgery  Hand and Upper Extremity Reconstruction    Scribe Attestation  By signing my name below, ISepideh , Scribe   attest that this documentation has been prepared under the direction and in the presence of Dr. Talon Gregorio.    Dictation performed with the use of voice recognition software.  Syntax and grammatical errors may exist.

## 2025-02-13 ENCOUNTER — TELEPHONE (OUTPATIENT)
Dept: OBSTETRICS AND GYNECOLOGY | Facility: CLINIC | Age: 55
End: 2025-02-13
Payer: COMMERCIAL

## 2025-02-13 NOTE — TELEPHONE ENCOUNTER
vibegron (Gemtesa) 75 mg tablet     Insurance won't cover, is asking if there is a generic version.

## 2025-02-17 ENCOUNTER — APPOINTMENT (OUTPATIENT)
Dept: ORTHOPEDIC SURGERY | Facility: CLINIC | Age: 55
End: 2025-02-17
Payer: COMMERCIAL

## 2025-02-20 DIAGNOSIS — I10 PRIMARY HYPERTENSION: ICD-10-CM

## 2025-02-24 ENCOUNTER — HOSPITAL ENCOUNTER (OUTPATIENT)
Dept: RADIOLOGY | Facility: HOSPITAL | Age: 55
Discharge: HOME | End: 2025-02-24
Payer: COMMERCIAL

## 2025-02-24 ENCOUNTER — HOSPITAL ENCOUNTER (OUTPATIENT)
Dept: CARDIOLOGY | Facility: HOSPITAL | Age: 55
Discharge: HOME | End: 2025-02-24
Payer: COMMERCIAL

## 2025-02-24 DIAGNOSIS — R07.89 OTHER CHEST PAIN: ICD-10-CM

## 2025-02-24 PROCEDURE — 93017 CV STRESS TEST TRACING ONLY: CPT

## 2025-02-24 PROCEDURE — 78452 HT MUSCLE IMAGE SPECT MULT: CPT

## 2025-02-24 PROCEDURE — 3430000001 HC RX 343 DIAGNOSTIC RADIOPHARMACEUTICALS: Performed by: INTERNAL MEDICINE

## 2025-02-24 PROCEDURE — A9502 TC99M TETROFOSMIN: HCPCS | Performed by: INTERNAL MEDICINE

## 2025-02-24 PROCEDURE — 93018 CV STRESS TEST I&R ONLY: CPT | Performed by: INTERNAL MEDICINE

## 2025-02-24 PROCEDURE — 93016 CV STRESS TEST SUPVJ ONLY: CPT | Performed by: INTERNAL MEDICINE

## 2025-02-24 RX ADMIN — TETROFOSMIN 30.8 MILLICURIE: 0.23 INJECTION, POWDER, LYOPHILIZED, FOR SOLUTION INTRAVENOUS at 10:28

## 2025-02-25 ENCOUNTER — HOSPITAL ENCOUNTER (OUTPATIENT)
Dept: RADIOLOGY | Facility: HOSPITAL | Age: 55
Discharge: HOME | End: 2025-02-25
Payer: COMMERCIAL

## 2025-02-25 ENCOUNTER — TELEPHONE (OUTPATIENT)
Dept: OBSTETRICS AND GYNECOLOGY | Facility: CLINIC | Age: 55
End: 2025-02-25
Payer: COMMERCIAL

## 2025-02-25 DIAGNOSIS — N39.41 URGE URINARY INCONTINENCE: ICD-10-CM

## 2025-02-25 PROCEDURE — 3430000001 HC RX 343 DIAGNOSTIC RADIOPHARMACEUTICALS: Performed by: INTERNAL MEDICINE

## 2025-02-25 PROCEDURE — A9502 TC99M TETROFOSMIN: HCPCS | Performed by: INTERNAL MEDICINE

## 2025-02-25 RX ORDER — SOLIFENACIN SUCCINATE 10 MG/1
10 TABLET, FILM COATED ORAL DAILY
Qty: 30 TABLET | Refills: 2 | Status: SHIPPED | OUTPATIENT
Start: 2025-02-25 | End: 2025-02-27 | Stop reason: ALTCHOICE

## 2025-02-25 RX ADMIN — TETROFOSMIN 31.5 MILLICURIE: 0.23 INJECTION, POWDER, LYOPHILIZED, FOR SOLUTION INTRAVENOUS at 11:00

## 2025-02-25 NOTE — TELEPHONE ENCOUNTER
Gemtesa PA denied, must try at least 2 preferred medications. Last note indicates solifenacin 10mg if Gemtesa not approved, pended to Dr Gray.

## 2025-02-27 ENCOUNTER — OFFICE VISIT (OUTPATIENT)
Dept: PRIMARY CARE | Facility: CLINIC | Age: 55
End: 2025-02-27
Payer: COMMERCIAL

## 2025-02-27 ENCOUNTER — TELEPHONE (OUTPATIENT)
Dept: PRIMARY CARE | Facility: CLINIC | Age: 55
End: 2025-02-27

## 2025-02-27 VITALS
HEIGHT: 64 IN | HEART RATE: 75 BPM | OXYGEN SATURATION: 96 % | DIASTOLIC BLOOD PRESSURE: 83 MMHG | BODY MASS INDEX: 50.02 KG/M2 | RESPIRATION RATE: 16 BRPM | WEIGHT: 293 LBS | SYSTOLIC BLOOD PRESSURE: 121 MMHG

## 2025-02-27 DIAGNOSIS — E66.813 CLASS 3 SEVERE OBESITY DUE TO EXCESS CALORIES WITH SERIOUS COMORBIDITY AND BODY MASS INDEX (BMI) OF 50.0 TO 59.9 IN ADULT: ICD-10-CM

## 2025-02-27 DIAGNOSIS — I10 BENIGN ESSENTIAL HYPERTENSION: Primary | ICD-10-CM

## 2025-02-27 DIAGNOSIS — E11.9 TYPE 2 DIABETES MELLITUS WITHOUT COMPLICATION, WITHOUT LONG-TERM CURRENT USE OF INSULIN (MULTI): Primary | ICD-10-CM

## 2025-02-27 DIAGNOSIS — E66.01 CLASS 3 SEVERE OBESITY DUE TO EXCESS CALORIES WITH SERIOUS COMORBIDITY AND BODY MASS INDEX (BMI) OF 50.0 TO 59.9 IN ADULT: ICD-10-CM

## 2025-02-27 DIAGNOSIS — E11.9 TYPE 2 DIABETES MELLITUS WITHOUT COMPLICATION, WITHOUT LONG-TERM CURRENT USE OF INSULIN (MULTI): ICD-10-CM

## 2025-02-27 DIAGNOSIS — E78.2 MIXED HYPERLIPIDEMIA: ICD-10-CM

## 2025-02-27 PROBLEM — E66.9 DIABETES MELLITUS TYPE 2 IN OBESE: Status: RESOLVED | Noted: 2023-08-22 | Resolved: 2025-02-27

## 2025-02-27 PROBLEM — E78.00 HYPERCHOLESTEREMIA: Status: RESOLVED | Noted: 2023-08-22 | Resolved: 2025-02-27

## 2025-02-27 PROBLEM — E11.69 DIABETES MELLITUS TYPE 2 IN OBESE: Status: RESOLVED | Noted: 2023-08-22 | Resolved: 2025-02-27

## 2025-02-27 LAB — POC HEMOGLOBIN A1C: 8.7 % (ref 4.2–6.5)

## 2025-02-27 PROCEDURE — 3074F SYST BP LT 130 MM HG: CPT

## 2025-02-27 PROCEDURE — 99214 OFFICE O/P EST MOD 30 MIN: CPT

## 2025-02-27 PROCEDURE — 4010F ACE/ARB THERAPY RXD/TAKEN: CPT

## 2025-02-27 PROCEDURE — 1036F TOBACCO NON-USER: CPT

## 2025-02-27 PROCEDURE — 3008F BODY MASS INDEX DOCD: CPT

## 2025-02-27 PROCEDURE — 3079F DIAST BP 80-89 MM HG: CPT

## 2025-02-27 PROCEDURE — 83036 HEMOGLOBIN GLYCOSYLATED A1C: CPT

## 2025-02-27 RX ORDER — METFORMIN HYDROCHLORIDE 500 MG/1
TABLET, EXTENDED RELEASE ORAL
Qty: 90 TABLET | Refills: 2 | Status: SHIPPED | OUTPATIENT
Start: 2025-02-27 | End: 2025-02-27

## 2025-02-27 RX ORDER — BLOOD-GLUCOSE SENSOR
EACH MISCELLANEOUS
Qty: 2 EACH | Refills: 3 | Status: SHIPPED | OUTPATIENT
Start: 2025-02-27

## 2025-02-27 RX ORDER — TIRZEPATIDE 2.5 MG/.5ML
2.5 INJECTION, SOLUTION SUBCUTANEOUS WEEKLY
Qty: 2 ML | Refills: 2 | Status: SHIPPED | OUTPATIENT
Start: 2025-02-27 | End: 2025-05-22

## 2025-02-27 RX ORDER — HYDROCHLOROTHIAZIDE 25 MG/1
25 TABLET ORAL DAILY
Qty: 90 TABLET | Refills: 1 | Status: SHIPPED | OUTPATIENT
Start: 2025-02-27 | End: 2025-08-26

## 2025-02-27 RX ORDER — METFORMIN HYDROCHLORIDE 500 MG/1
TABLET, EXTENDED RELEASE ORAL
Qty: 270 TABLET | Refills: 0 | Status: SHIPPED | OUTPATIENT
Start: 2025-02-27 | End: 2025-05-28

## 2025-02-27 ASSESSMENT — PAIN SCALES - GENERAL: PAINLEVEL_OUTOF10: 0-NO PAIN

## 2025-02-27 ASSESSMENT — COLUMBIA-SUICIDE SEVERITY RATING SCALE - C-SSRS
1. IN THE PAST MONTH, HAVE YOU WISHED YOU WERE DEAD OR WISHED YOU COULD GO TO SLEEP AND NOT WAKE UP?: NO
2. HAVE YOU ACTUALLY HAD ANY THOUGHTS OF KILLING YOURSELF?: NO
6. HAVE YOU EVER DONE ANYTHING, STARTED TO DO ANYTHING, OR PREPARED TO DO ANYTHING TO END YOUR LIFE?: NO

## 2025-02-27 ASSESSMENT — PATIENT HEALTH QUESTIONNAIRE - PHQ9
2. FEELING DOWN, DEPRESSED OR HOPELESS: NOT AT ALL
1. LITTLE INTEREST OR PLEASURE IN DOING THINGS: NOT AT ALL
SUM OF ALL RESPONSES TO PHQ9 QUESTIONS 1 AND 2: 0

## 2025-02-27 ASSESSMENT — ENCOUNTER SYMPTOMS
OCCASIONAL FEELINGS OF UNSTEADINESS: 0
LOSS OF SENSATION IN FEET: 0
DEPRESSION: 0

## 2025-02-27 NOTE — ASSESSMENT & PLAN NOTE
Orders:    hydroCHLOROthiazide (HYDRODiuril) 25 mg tablet; Take 1 tablet (25 mg) by mouth once daily.  HYPERTENSION:   Decrease intake of processed foods, fast foods, lunch meat, canned soups, canned veggies.  Increase intake of fresh fruits, veggies, and lean meats. Monitor blood pressure at home, keep a log and bring this with you to your next appointment. Call the office if your blood pressure runs 150/90 or higher consistently.  Blood Pressure Technique:  Sit quietly in a chair for 5 minutes with back supported and feet on the floor  Then place left arm on a table or armrest so bicep area is at the same level of heart or left breast  Check three times in a row, disregard the highest reading and average the other two

## 2025-02-27 NOTE — ASSESSMENT & PLAN NOTE
HYPERLIPIDEMIA:  Your cholesterol level is elevated. Decrease intake of saturated fats, fast food, sweets.  Increase intake of fresh fruit fresh vegetables and lean meats.  Increase healthy fats seeds, nuts, olive oil instead of butter.  walk 150 minutes/week for heart health.   Aim for 25-30 grams of fiber in your diet daily.  May consider adding Fish Oil supplement 1,200 mg per day or Omega 3 Supplement daily.    Please follow up in 6 months and we will recheck.  Please reach out with any questions.

## 2025-02-27 NOTE — PROGRESS NOTES
Subjective   Patient ID: Vilma Grover is a 54 y.o. female who presents for 6 month follow up    HPI   DM   AM blood sugars-  post coffee and protein bar  Coffee with [place] vanilla creamer  Has not taken trulicity in over a month due to cost  Admits increasing desire to eat foods, increased appetite   Has been taking 500mg of metformin once daily. Not as prescribed     HTN  129-171/  Compliant with hydrochlorothiazide 12.5 once daily   Valsartan daily   Will increase dose at this time.  Per patient cards increased dose from 12.5mg to 25mg per patient.     Review of Systems  Review of Systems negative except as noted in HPI and Chief complaint.    Current Outpatient Medications:     acetaminophen (Tylenol) 500 mg tablet, Take 2 tablets (1,000 mg) by mouth every 6 hours if needed for mild pain (1 - 3)., Disp: , Rfl:     albuterol 90 mcg/actuation inhaler, Inhale 2 puffs every 4 hours if needed for wheezing or shortness of breath. Q4h while awake x 4 days then prn, please add spacer, Disp: 18 g, Rfl: 0    aspirin ([place] Aspirin) 81 mg EC tablet, Take 1 tablet (81 mg) by mouth once daily., Disp: , Rfl:     atorvastatin (Lipitor) 20 mg tablet, Take 1 tablet (20 mg) by mouth once daily., Disp: 90 tablet, Rfl: 3    cholecalciferol (Vitamin D3) 50 MCG (2000 UT) tablet, Take 1 tablet (50 mcg) by mouth once daily at bedtime., Disp: , Rfl:     citalopram (CeleXA) 40 mg tablet, TAKE ONE TABLET BY MOUTH EVERY DAY, Disp: 30 tablet, Rfl: 5    cyanocobalamin (Vitamin B-12) 100 mcg tablet, Take 5 tablets (500 mcg) by mouth once daily. For 30 days, Disp: , Rfl:     ferrous sulfate 325 (65 Fe) MG EC tablet, Take 65 mg by mouth 3 times daily (morning, midday, late afternoon). Do not crush, chew, or split., Disp: , Rfl:     lancets misc, Use to monitor blood glucose once daily, Disp: 100 each, Rfl: 1    MULTIVITAMIN ORAL, Take 1 tablet by mouth once daily. For 30 days, Disp: , Rfl:     nystatin (Mycostatin) cream,  "Apply 1 Application topically 2 times a day as needed (rash/irritation)., Disp: , Rfl:     OneTouch Ultra Test strip, 1 each once daily., Disp: 100 each, Rfl: 1    valsartan (Diovan) 320 mg tablet, Take 1 tablet (320 mg) by mouth once daily., Disp: 90 tablet, Rfl: 1    FreeStyle Darshan 3 Sensor device, Apply to back of arm once every 15 days for blood glucose management, Disp: 2 each, Rfl: 3    hydroCHLOROthiazide (HYDRODiuril) 25 mg tablet, Take 1 tablet (25 mg) by mouth once daily., Disp: 90 tablet, Rfl: 1    metFORMIN XR (Glucophage-XR) 500 mg 24 hr tablet, Take 2 tablets (1,000 mg) by mouth once daily in the evening. Take with meals AND 1 tablet (500 mg) once daily in the morning. Do not crush, chew, or split.., Disp: 90 tablet, Rfl: 2    tirzepatide (Mounjaro) 2.5 mg/0.5 mL pen injector, Inject 2.5 mg under the skin 1 (one) time per week., Disp: 2 mL, Rfl: 2    Objective   /83 (BP Location: Left arm, Patient Position: Sitting, BP Cuff Size: Adult)   Pulse 75   Resp 16   Ht 1.626 m (5' 4\")   Wt 145 kg (320 lb 9.6 oz)   SpO2 96%   BMI 55.03 kg/m²     Physical Exam  Vitals reviewed.   Cardiovascular:      Rate and Rhythm: Normal rate.   Pulmonary:      Effort: Pulmonary effort is normal.   Musculoskeletal:      Cervical back: Normal range of motion.   Neurological:      General: No focal deficit present.      Mental Status: She is alert.   Psychiatric:         Mood and Affect: Mood normal.         Assessment/Plan   Assessment & Plan  Type 2 diabetes mellitus without complication, without long-term current use of insulin (Multi)    Orders:    POCT glycosylated hemoglobin (Hb A1C) manually resulted    tirzepatide (Mounjaro) 2.5 mg/0.5 mL pen injector; Inject 2.5 mg under the skin 1 (one) time per week.    metFORMIN XR (Glucophage-XR) 500 mg 24 hr tablet; Take 2 tablets (1,000 mg) by mouth once daily in the evening. Take with meals AND 1 tablet (500 mg) once daily in the morning. Do not crush, chew, or " split..    FreeStyle Darshan 3 Sensor device; Apply to back of arm once every 15 days for blood glucose management    Referral to Diabetes Education; Future   Diabetes Type 2  A1C is now 8.7 from 6.9   Continue current medication. Begin mounjaro once weekly,  Increase metformin 500mg in am 1000mg with dinner.   Continue work on diet - recommend lots of fruits and vegetables, lean protein like chicken, turkey, fish, beans and Greek yogurt. Try to choose healthier carbohydrate options like oatmeal, wheat bread and pasta, sweet potatoes. Limit sugary treats.  Check a fasting sugar first thing in the AM twice daily and keep a log of the results to bring to your next office visit.  Please contact office if your sugars are consistently >140.  Reevaluate in 3 months.   Benign essential hypertension    Orders:    hydroCHLOROthiazide (HYDRODiuril) 25 mg tablet; Take 1 tablet (25 mg) by mouth once daily.  HYPERTENSION:   Decrease intake of processed foods, fast foods, lunch meat, canned soups, canned veggies.  Increase intake of fresh fruits, veggies, and lean meats. Monitor blood pressure at home, keep a log and bring this with you to your next appointment. Call the office if your blood pressure runs 150/90 or higher consistently.  Blood Pressure Technique:  Sit quietly in a chair for 5 minutes with back supported and feet on the floor  Then place left arm on a table or armrest so bicep area is at the same level of heart or left breast  Check three times in a row, disregard the highest reading and average the other two    Mixed hyperlipidemia      HYPERLIPIDEMIA:  Your cholesterol level is elevated. Decrease intake of saturated fats, fast food, sweets.  Increase intake of fresh fruit fresh vegetables and lean meats.  Increase healthy fats seeds, nuts, olive oil instead of butter.  walk 150 minutes/week for heart health.   Aim for 25-30 grams of fiber in your diet daily.  May consider adding Fish Oil supplement 1,200 mg per day  or Omega 3 Supplement daily.    Please follow up in 6 months and we will recheck.  Please reach out with any questions.     Class 3 severe obesity due to excess calories with serious comorbidity and body mass index (BMI) of 50.0 to 59.9 in adult             This note was dictated using DRAGON speech recognition software and was corrected for spelling or grammatical errors, but despite proofreading several typographical errors might be present that might affect the meaning of the content.  Rachel Savage, CNP

## 2025-02-27 NOTE — ASSESSMENT & PLAN NOTE
Orders:    POCT glycosylated hemoglobin (Hb A1C) manually resulted    tirzepatide (Mounjaro) 2.5 mg/0.5 mL pen injector; Inject 2.5 mg under the skin 1 (one) time per week.    metFORMIN XR (Glucophage-XR) 500 mg 24 hr tablet; Take 2 tablets (1,000 mg) by mouth once daily in the evening. Take with meals AND 1 tablet (500 mg) once daily in the morning. Do not crush, chew, or split..    FreeStyle Darshan 3 Sensor device; Apply to back of arm once every 15 days for blood glucose management    Referral to Diabetes Education; Future   Diabetes Type 2  A1C is now 8.7 from 6.9   Continue current medication. Begin mounjaro once weekly,  Increase metformin 500mg in am 1000mg with dinner.   Continue work on diet - recommend lots of fruits and vegetables, lean protein like chicken, turkey, fish, beans and Greek yogurt. Try to choose healthier carbohydrate options like oatmeal, wheat bread and pasta, sweet potatoes. Limit sugary treats.  Check a fasting sugar first thing in the AM twice daily and keep a log of the results to bring to your next office visit.  Please contact office if your sugars are consistently >140.  Reevaluate in 3 months.

## 2025-02-27 NOTE — TELEPHONE ENCOUNTER
Patient's pharmacy [facility] sent a fax stating that they need a new Rx sent with correct directions for metformin. It looks like it was sent through to take 2 tablets (1,000 mg) by mouth once daily in the evening. Take with meals AND 1 tablet (500 mg) once daily in the morning. Correct pharmacy selected.

## 2025-03-03 ENCOUNTER — TELEPHONE (OUTPATIENT)
Dept: OBSTETRICS AND GYNECOLOGY | Facility: CLINIC | Age: 55
End: 2025-03-03
Payer: COMMERCIAL

## 2025-03-03 NOTE — TELEPHONE ENCOUNTER
Returned patient call regarding medication, received voicemail. Left message for patient to call the office.

## 2025-03-07 ENCOUNTER — APPOINTMENT (OUTPATIENT)
Dept: OBSTETRICS AND GYNECOLOGY | Facility: CLINIC | Age: 55
End: 2025-03-07
Payer: COMMERCIAL

## 2025-03-18 ENCOUNTER — OFFICE VISIT (OUTPATIENT)
Facility: CLINIC | Age: 55
End: 2025-03-18
Payer: COMMERCIAL

## 2025-03-18 VITALS — SYSTOLIC BLOOD PRESSURE: 121 MMHG | BODY MASS INDEX: 53.9 KG/M2 | WEIGHT: 293 LBS | DIASTOLIC BLOOD PRESSURE: 80 MMHG

## 2025-03-18 DIAGNOSIS — I10 BENIGN ESSENTIAL HYPERTENSION: Primary | ICD-10-CM

## 2025-03-18 DIAGNOSIS — R07.89 OTHER CHEST PAIN: ICD-10-CM

## 2025-03-18 PROCEDURE — 4010F ACE/ARB THERAPY RXD/TAKEN: CPT | Performed by: INTERNAL MEDICINE

## 2025-03-18 PROCEDURE — 3074F SYST BP LT 130 MM HG: CPT | Performed by: INTERNAL MEDICINE

## 2025-03-18 PROCEDURE — 1036F TOBACCO NON-USER: CPT | Performed by: INTERNAL MEDICINE

## 2025-03-18 PROCEDURE — 3079F DIAST BP 80-89 MM HG: CPT | Performed by: INTERNAL MEDICINE

## 2025-03-18 PROCEDURE — 99213 OFFICE O/P EST LOW 20 MIN: CPT | Performed by: INTERNAL MEDICINE

## 2025-03-18 RX ORDER — DULAGLUTIDE 3 MG/.5ML
3 INJECTION, SOLUTION SUBCUTANEOUS
COMMUNITY
Start: 2025-03-01

## 2025-03-18 ASSESSMENT — ENCOUNTER SYMPTOMS
COUGH: 0
ORTHOPNEA: 0
MYALGIAS: 0
FEVER: 0
SYNCOPE: 0
LOSS OF SENSATION IN FEET: 0
PALPITATIONS: 0
WEIGHT LOSS: 0
DYSPNEA ON EXERTION: 0
NEAR-SYNCOPE: 0
DIZZINESS: 0
OCCASIONAL FEELINGS OF UNSTEADINESS: 0
PND: 0
IRREGULAR HEARTBEAT: 0
DEPRESSION: 0
WHEEZING: 0
WEIGHT GAIN: 0
WEAKNESS: 0
DIAPHORESIS: 0
SHORTNESS OF BREATH: 0
CLAUDICATION: 0

## 2025-03-18 ASSESSMENT — PAIN SCALES - GENERAL: PAINLEVEL_OUTOF10: 0-NO PAIN

## 2025-03-18 ASSESSMENT — LIFESTYLE VARIABLES: TOTAL SCORE: 0

## 2025-03-18 NOTE — PROGRESS NOTES
Subjective      Chief Complaint   Patient presents with    1 month f/u results        54-year-old female with history of hypertension, diabetes mellitus type 2, hyperlipidemia who I saw last month for cardiac evaluation.  She was seen in the emergency room recently for chest discomfort.  Blood pressures were elevated, she attests to systolics in the 200s however on presentation she was normotensive.  Her EKG was nonischemic and her high-sensitivity troponins were normal.  We arranged for an exercise stress test she is here to discuss the results.  She exercised to 4.6 METS reaching 88% max predicted total heart rate.  Other than a below average functional capacity there was no clinical, EKG, SPECT evidence of myocardial ischemia.           Review of Systems   Constitutional: Negative for diaphoresis, fever, weight gain and weight loss.   Eyes:  Negative for visual disturbance.   Cardiovascular:  Negative for chest pain, claudication, dyspnea on exertion, irregular heartbeat, leg swelling, near-syncope, orthopnea, palpitations, paroxysmal nocturnal dyspnea and syncope.   Respiratory:  Negative for cough, shortness of breath and wheezing.    Musculoskeletal:  Negative for muscle weakness and myalgias.   Neurological:  Negative for dizziness and weakness.   All other systems reviewed and are negative.       Past Medical History:   Diagnosis Date    Abnormal Pap smear of cervix     Anxiety     Arthritis     Colon polyps     Depression     Diabetes (Multi)     High cholesterol     Hypertension     Obesity     Scoliosis     Sleep apnea     Urinary tract infection     Varicella         Past Surgical History:   Procedure Laterality Date    APPENDECTOMY      BREAST BIOPSY Right     2018 right stereo bx benign through ccl     SECTION, CLASSIC       Section     SECTION, CLASSIC       SECTION, CLASSIC      baby passed away    CONDYLOMA EXCISION/FULGURATION      OTHER SURGICAL  HISTORY  10/23/2014    Bunion Correction By Alfred Procedure        Social History     Socioeconomic History    Marital status:      Spouse name: Not on file    Number of children: Not on file    Years of education: Not on file    Highest education level: Not on file   Occupational History    Not on file   Tobacco Use    Smoking status: Former     Current packs/day: 0.00     Average packs/day: 1 pack/day for 20.0 years (20.0 ttl pk-yrs)     Types: Cigarettes     Start date:      Quit date: 2018     Years since quittin.2     Passive exposure: Past    Smokeless tobacco: Never    Tobacco comments:     Quit 2018   Vaping Use    Vaping status: Never Used   Substance and Sexual Activity    Alcohol use: Yes     Alcohol/week: 2.0 standard drinks of alcohol     Types: 2 Glasses of wine per week     Comment: Drink occasionally    Drug use: Never    Sexual activity: Yes     Partners: Male     Birth control/protection: None   Other Topics Concern    Not on file   Social History Narrative    Not on file     Social Drivers of Health     Financial Resource Strain: Low Risk  (6/3/2024)    Overall Financial Resource Strain (CARDIA)     Difficulty of Paying Living Expenses: Not hard at all   Food Insecurity: Food Insecurity Present (2020)    Received from [facility]    Hunger Vital Sign     Worried About Running Out of Food in the Last Year: Sometimes true     Ran Out of Food in the Last Year: Sometimes true   Transportation Needs: No Transportation Needs (2024)    OASIS : Transportation     Lack of Transportation (Medical): No     Lack of Transportation (Non-Medical): No     Patient Unable or Declines to Respond: No   Physical Activity: Unknown (2/15/2023)    Received from [facility]    Exercise Vital Sign     Days of Exercise per Week: Patient declined     Minutes of Exercise per Session: Patient declined   Stress: Stress Concern Present (2/15/2023)     Received from [facility] - Occupational Stress Questionnaire     Feeling of Stress : Rather much   Social Connections: Feeling Socially Integrated (6/27/2024)    OASIS : Social Isolation     Frequency of experiencing loneliness or isolation: Never   Intimate Partner Violence: Not on file   Housing Stability: Unknown (6/3/2024)    Housing Stability Vital Sign     Unable to Pay for Housing in the Last Year: No     Number of Places Lived in the Last Year: Not on file     Unstable Housing in the Last Year: No        Family History   Problem Relation Name Age of Onset    Dementia Mother Fely     Coronary artery disease Mother Fely     Heart attack Mother Fely     Other (Uterine leiomyoma) Mother Fely     Arthritis Mother Fely     Depression Mother Fely     Hearing loss Mother Fely     Prostate cancer Father Chato         in remission heart vave replacment in 2/2023    Atrial fibrillation Father Chato     Anxiety disorder Brother      Depression Brother      Migraines Daughter      Other (bruises easily) Daughter          OBJECTIVE:    Vitals:    03/18/25 1332   BP: 121/80        Vitals reviewed.   Constitutional:       Appearance: Normal and healthy appearance. Not in distress.   Pulmonary:      Effort: Pulmonary effort is normal.      Breath sounds: Normal breath sounds.   Cardiovascular:      Normal rate. Regular rhythm. Normal S1. Normal S2.       Murmurs: There is no murmur.      No gallop.  No click.   Pulses:     Intact distal pulses.   Edema:     Peripheral edema absent.   Skin:     General: Skin is warm and dry.   Neurological:      General: No focal deficit present.          Lab Review:   Lab Results   Component Value Date     01/10/2025    K 4.6 01/10/2025     01/10/2025    CO2 27 01/10/2025    BUN 11 01/10/2025    CREATININE 0.67 01/10/2025    GLUCOSE 100 (H) 01/10/2025    CALCIUM  8.9 01/10/2025     Lab Results   Component Value Date    CHOL 156 08/28/2024    TRIG 101 08/28/2024    HDL 48.0 (L) 08/28/2024       Lab Results   Component Value Date    LDLCALC 88 08/28/2024        Benign essential hypertension  Much better control on hydrochlorothiazide, labs were checked and are ok. Discussed checking BP 2-3x/week. Lifestyle modifications were discussed. I advocated for mediterranean and plant based eating. We also discussed exercise. 150 minutes a week of moderate intensity exercise is recommended per our ACC/AHA guidelines      Other chest pain  Normal/low risk stress test. Reassurance provided

## 2025-03-18 NOTE — ASSESSMENT & PLAN NOTE
Much better control on hydrochlorothiazide, labs were checked and are ok. Discussed checking BP 2-3x/week. Lifestyle modifications were discussed. I advocated for mediterranean and plant based eating. We also discussed exercise. 150 minutes a week of moderate intensity exercise is recommended per our ACC/AHA guidelines

## 2025-04-10 ENCOUNTER — PRE-ADMISSION TESTING (OUTPATIENT)
Dept: PREADMISSION TESTING | Facility: HOSPITAL | Age: 55
End: 2025-04-10
Payer: COMMERCIAL

## 2025-04-10 VITALS
SYSTOLIC BLOOD PRESSURE: 136 MMHG | WEIGHT: 293 LBS | HEART RATE: 71 BPM | DIASTOLIC BLOOD PRESSURE: 91 MMHG | BODY MASS INDEX: 50.02 KG/M2 | OXYGEN SATURATION: 97 % | TEMPERATURE: 98.6 F | HEIGHT: 64 IN

## 2025-04-10 PROCEDURE — 99214 OFFICE O/P EST MOD 30 MIN: CPT

## 2025-04-10 RX ORDER — OMEPRAZOLE 40 MG/1
1 CAPSULE, DELAYED RELEASE ORAL
COMMUNITY
Start: 2025-03-19

## 2025-04-10 ASSESSMENT — DUKE ACTIVITY SCORE INDEX (DASI)
CAN YOU TAKE CARE OF YOURSELF (EAT, DRESS, BATHE, OR USE TOILET): YES
CAN YOU DO MODERATE WORK AROUND THE HOUSE LIKE VACUUMING, SWEEPING FLOORS OR CARRYING GROCERIES: YES
CAN YOU CLIMB A FLIGHT OF STAIRS OR WALK UP A HILL: YES
CAN YOU WALK A BLOCK OR TWO ON LEVEL GROUND: YES
CAN YOU HAVE SEXUAL RELATIONS: YES
DASI METS SCORE: 7.3
CAN YOU DO HEAVY WORK AROUND THE HOUSE LIKE SCRUBBING FLOORS OR LIFTING AND MOVING HEAVY FURNITURE: YES
CAN YOU RUN A SHORT DISTANCE: NO
CAN YOU DO LIGHT WORK AROUND THE HOUSE LIKE DUSTING OR WASHING DISHES: YES
TOTAL_SCORE: 36.7
CAN YOU DO YARD WORK LIKE RAKING LEAVES, WEEDING OR PUSHING A MOWER: YES
CAN YOU WALK INDOORS, SUCH AS AROUND YOUR HOUSE: YES
CAN YOU PARTICIPATE IN STRENOUS SPORTS LIKE SWIMMING, SINGLES TENNIS, FOOTBALL, BASKETBALL, OR SKIING: NO
CAN YOU PARTICIPATE IN MODERATE RECREATIONAL ACTIVITIES LIKE GOLF, BOWLING, DANCING, DOUBLES TENNIS OR THROWING A BASEBALL OR FOOTBALL: NO

## 2025-04-10 ASSESSMENT — ENCOUNTER SYMPTOMS
HEMATOLOGIC/LYMPHATIC NEGATIVE: 1
MUSCULOSKELETAL NEGATIVE: 1
ALLERGIC/IMMUNOLOGIC NEGATIVE: 1
EYES NEGATIVE: 1
GASTROINTESTINAL NEGATIVE: 1
PSYCHIATRIC NEGATIVE: 1
NEUROLOGICAL NEGATIVE: 1
RESPIRATORY NEGATIVE: 1
ENDOCRINE NEGATIVE: 1
CONSTITUTIONAL NEGATIVE: 1

## 2025-04-10 ASSESSMENT — PAIN - FUNCTIONAL ASSESSMENT: PAIN_FUNCTIONAL_ASSESSMENT: 0-10

## 2025-04-10 ASSESSMENT — PAIN SCALES - GENERAL: PAINLEVEL_OUTOF10: 0 - NO PAIN

## 2025-04-10 NOTE — PREPROCEDURE INSTRUCTIONS
Medication List            Accurate as of April 10, 2025 11:08 AM. Always use your most recent med list.                acetaminophen 500 mg tablet  Commonly known as: Tylenol  Medication Adjustments for Surgery: Take/Use as prescribed     albuterol 90 mcg/actuation inhaler  Inhale 2 puffs every 4 hours if needed for wheezing or shortness of breath. Q4h while awake x 4 days then prn, please add spacer  Medication Adjustments for Surgery: Take/Use as prescribed     atorvastatin 20 mg tablet  Commonly known as: Lipitor  Take 1 tablet (20 mg) by mouth once daily.  Medication Adjustments for Surgery: Take/Use as prescribed     citalopram 40 mg tablet  Commonly known as: CeleXA  TAKE ONE TABLET BY MOUTH EVERY DAY  Medication Adjustments for Surgery: Take on the morning of surgery     cyanocobalamin 100 mcg tablet  Commonly known as: Vitamin B-12  Additional Medication Adjustments for Surgery: Take last dose 7 days before surgery     ferrous sulfate 325 (65 Fe) mg EC tablet  Additional Medication Adjustments for Surgery: Take last dose 7 days before surgery     FreeStyle Darshan 3 Sensor device  Generic drug: blood-glucose sensor  Apply to back of arm once every 15 days for blood glucose management     hydroCHLOROthiazide 25 mg tablet  Commonly known as: HYDRODiuril  Take 1 tablet (25 mg) by mouth once daily.  Medication Adjustments for Surgery: Take on the morning of surgery     lancets misc  Use to monitor blood glucose once daily     metFORMIN  mg 24 hr tablet  Commonly known as: Glucophage-XR  Take 1 tablet (500 mg) by mouth once daily at bedtime AND 2 tablets (1,000 mg) once daily in the morning. Do not crush, chew, or split..  Medication Adjustments for Surgery: Do Not take on the morning of surgery     MULTIVITAMIN ORAL  Additional Medication Adjustments for Surgery: Take last dose 7 days before surgery     OneTouch Ultra Test  Generic drug: blood sugar diagnostic  1 each once daily.     [facility]  81 mg EC tablet  Generic drug: aspirin  Additional Medication Adjustments for Surgery: Take last dose 5 days before surgery     Trulicity 3 mg/0.5 mL injection  Generic drug: dulaglutide  Medication Adjustments for Surgery: Take/Use as prescribed     valsartan 320 mg tablet  Commonly known as: Diovan  Take 1 tablet (320 mg) by mouth once daily.  Medication Adjustments for Surgery: Do Not take on the morning of surgery     Vitamin D3 50 mcg (2,000 units) tablet  Generic drug: cholecalciferol  Additional Medication Adjustments for Surgery: Take last dose 7 days before surgery       OMEPRAZOLE 40MG DAILY                  FOLLOW INSTRUCTIONS PROVIDED BY DR. CHOWDHURY    NPO Instructions:    Do not eat any food after midnight the night before your surgery/procedure.      Preoperative Fasting Guidelines   GLP-1 Medications  Why must I stop eating and drinking before surgery?  With anesthesia, food or liquid in your stomach can enter your lungs causing serious complications  GLP-1 medications can slow the movement of food through your stomach and intestines.  This further increases the risk of food entering your lungs with anesthesia  When do I need to stop eating and drinking before my surgery?  To help ensure food has passed out of your stomach, START a clear liquid diet 24 hours before your surgery  surgery/procedure, STOP LIQUIDS AFTER MIDNIGHT  A clear liquid diet consists of clear liquids and foods that melt into a clear liquid (i.e. gelatin) and excludes solid foods and liquids you cannot see through (i.e. milk). Clears can and should contain sugar to obtain a sufficient number of calories.  A clear liquid diet includes  Clear, fat-free broth  Clear nutritional drinks  Pulp-free popsicles, vegetable and fruit juice  Gelatin  Coffee and tea without creamer or milk  Clear soda and sports drinks    Diabetic Patients  Clear liquids should not be sugar-free   Check your blood glucose levels as you normally do  If you have  symptoms of low blood glucose (shakiness, sweating, dizziness, confusion) or high blood glucose (dry mouth, excessive thirst, frequent urination, blurry vision), check your blood glucose level  For low blood glucose increase your consumption of sugar-containing clear liquid   For high blood glucose, decrease your consumption of sugar-containing clears and treat as you normally would  If symptoms persist seek medical attention    Examples of GLP-1 Medications  Stan Chaney  Additional Instructions:     Day of Surgery:  Review your medication instructions, take indicated medications  Wear  comfortable loose fitting clothing  Do not use moisturizers, creams, lotions or perfume  All jewelry and valuables should be left at home    PAT DISCHARGE INSTRUCTIONS    Please call the Same Day Surgery (SDS) Department of the hospital where your procedure will be performed after 2:00 PM the day before your surgery. If you are scheduled on a Monday, or a Tuesday following a Monday holiday, you will need to call on the last business day prior to your surgery.      [address], [facility]  [phone]  Second Floor        Please let your surgeon know if:      You develop any open sores, shingles, burning or painful urination as these may increase your risk of an infection.   You no longer wish to have the surgery.   Any other personal circumstances change that may lead to the need to cancel or defer this surgery-such as being sick or getting admitted to any hospital within one week of your planned procedure.    Your contact details change, such as a change of address or phone number.    Starting now:     Please DO NOT drink alcohol or smoke for 24 hours before surgery. It is well known that quitting smoking can make a huge difference to your health and recovery from surgery. The longer you  abstain from smoking, the better your chances of a healthy recovery. If you need help with quitting, call 800QUIT-NOW to be connected to a trained counselor who will discuss the best methods to help you quit.     Before your surgery:    Please stop all supplements 7 days prior to surgery. Or as directed by your surgeon.   Please stop taking NSAID pain medicine such as Advil and Motrin 7 days before surgery.    If you develop any fever, cough, cold, rashes, cuts, scratches, scrapes, urinary symptoms or infection anywhere on your body (including teeth and gums) prior to surgery, please call your surgeon’s office as soon as possible. This may require treatment to reduce the chance of cancellation on the day of surgery.    The day before your surgery:   DIET- Please follow the diet instructions at the top of your packet.   Get a good night’s rest.  Use the special soap for bathing if you have been instructed to use one.    Scheduled surgery times may change and you will be notified if this occurs - please check your personal voicemail for any updates.     On the morning of surgery:   Wear comfortable, loose fitting clothes which open in the front. Please do not wear moisturizers, creams, lotions, makeup or perfume.    Please bring with you to surgery:   Photo ID and insurance card   Current list of medicines and allergies   Pacemaker/ Defibrillator/Heart stent cards   CPAP machine and mask    Slings/ splints/ crutches   A copy of your complete advanced directive/DHPOA.    Please do NOT bring with you to surgery:   All jewelry and valuables should be left at home.   Prosthetic devices such as contact lenses, hearing aids, dentures, eyelash extensions, hairpins and body piercings must be removed prior to going in to the surgical suite.    After outpatient surgery:   A responsible adult MUST accompany you at the time of discharge and stay with you for 24 hours after your surgery. You may NOT drive yourself home after  surgery.    Do not drive, operate machinery, make critical decisions or do activities that require co-ordination or balance until after a night’s sleep.   Do not drink alcoholic beverages for 24 hours.   Instructions for resuming your medications will be provided by your surgeon.    CALL YOUR DOCTOR AFTER SURGERY IF YOU HAVE:     Chills and/or a fever of 101° F or higher.    Redness, swelling, pus or drainage from your surgical wound or a bad smell from the wound.    Lightheadedness, fainting or confusion.    Persistent vomiting (throwing up) and are not able to eat or drink for 12 hours.    Three or more loose, watery bowel movements in 24 hours (diarrhea).   Difficulty or pain while urinating( after non-urological surgery)    Pain and swelling in your legs, especially if it is only on one side.    Difficulty breathing or are breathing faster than normal.    Any new concerning symptoms.

## 2025-04-10 NOTE — CPM/PAT H&P
CPM/PAT Evaluation       Name: Vilma Grover (Vilma Grover)  /Age: 1970/54 y.o.     In-Person       Chief Complaint: Chest pain    HPI: Vilma Grover is a 54 year old female with complaints of occasional chest pain. She has been seen in the ER a few times for chest pain. She did have cardiac workup done that was negative for ischemia. She states she was told it may be GI related. She states she has chest  discomfort sometimes after eating certain foods. She denies regurgitation. She states she has occasional nausea. She is scheduled for an EGD. She denies fever, chills, vomiting, sob, dizziness,and palpitations.     Past Medical History:   Diagnosis Date    Abnormal Pap smear of cervix     Anxiety     Arthritis     Colon polyps     Depression     Diabetes (Multi)     High cholesterol     Hypertension     Obesity     Scoliosis     Sleep apnea     Type 2 diabetes mellitus     Urinary tract infection     Varicella        Past Surgical History:   Procedure Laterality Date    APPENDECTOMY      BREAST BIOPSY Right     2018 right stereo bx benign through ccl     SECTION, CLASSIC       Section     SECTION, CLASSIC       SECTION, CLASSIC      baby passed away    CONDYLOMA EXCISION/FULGURATION      OTHER SURGICAL HISTORY  10/23/2014    Bunion Correction By Alfred Procedure    OTHER SURGICAL HISTORY      tendon transfer       Social History     Tobacco Use    Smoking status: Former     Current packs/day: 0.00     Average packs/day: 1 pack/day for 29.0 years (29.0 ttl pk-yrs)     Types: Cigarettes     Start date:      Quit date: 2018     Years since quittin.2     Passive exposure: Past    Smokeless tobacco: Never    Tobacco comments:     Quit 2018   Substance Use Topics    Alcohol use: Not Currently     Comment: Drink occasionally LESS THAN 1 X WEEK     Social History     Substance and Sexual Activity   Drug Use Never       Family History   Problem Relation  Name Age of Onset    Dementia Mother Fely     Coronary artery disease Mother Fely     Heart attack Mother Fely     Other (Uterine leiomyoma) Mother Fely     Arthritis Mother Fely     Depression Mother Fely     Hearing loss Mother Fely     Prostate cancer Father Chato         in remission heart vave replacment in 2/2023    Atrial fibrillation Father Chato     Anxiety disorder Brother      Depression Brother      Migraines Daughter      Other (bruises easily) Daughter         Allergies   Allergen Reactions    Empagliflozin Other     Yeast    Erythromycin Base GI Upset     Upset stomach    Lisinopril Cough     Dry cough     Current Outpatient Medications   Medication Sig Dispense Refill    omeprazole (PriLOSEC) 40 mg DR capsule Take 1 capsule (40 mg) by mouth early in the morning..      acetaminophen (Tylenol) 500 mg tablet Take 2 tablets (1,000 mg) by mouth every 6 hours if needed for mild pain (1 - 3).      albuterol 90 mcg/actuation inhaler Inhale 2 puffs every 4 hours if needed for wheezing or shortness of breath. Q4h while awake x 4 days then prn, please add spacer 18 g 0    aspirin ([place] Aspirin) 81 mg EC tablet Take 1 tablet (81 mg) by mouth once daily.      atorvastatin (Lipitor) 20 mg tablet Take 1 tablet (20 mg) by mouth once daily. 90 tablet 3    cholecalciferol (Vitamin D3) 50 MCG (2000 UT) tablet Take 1 tablet (50 mcg) by mouth once daily at bedtime.      citalopram (CeleXA) 40 mg tablet TAKE ONE TABLET BY MOUTH EVERY DAY 30 tablet 5    cyanocobalamin (Vitamin B-12) 100 mcg tablet Take 1 tablet (100 mcg) by mouth once daily. For 30 days      ferrous sulfate 325 (65 Fe) MG EC tablet Take 65 mg by mouth once daily with breakfast. Do not crush, chew, or split.      FreeStyle Darshan 3 Sensor device Apply to back of arm once every 15 days for blood glucose management 2 each 3    hydroCHLOROthiazide (HYDRODiuril) 25 mg tablet Take 1 tablet (25 mg) by mouth once  daily. 90 tablet 1    lancets misc Use to monitor blood glucose once daily 100 each 1    metFORMIN XR (Glucophage-XR) 500 mg 24 hr tablet Take 1 tablet (500 mg) by mouth once daily at bedtime AND 2 tablets (1,000 mg) once daily in the morning. Do not crush, chew, or split.. (Patient taking differently: Take 1 tablet (500 mg) by mouth once daily IN MORNING AND 2 tablets (1,000 mg) once daily AT NIGHT Do not crush, chew, or split..) 270 tablet 0    MULTIVITAMIN ORAL Take 1 tablet by mouth once daily. For 30 days      OneTouch Ultra Test strip 1 each once daily. 100 each 1    Trulicity 3 mg/0.5 mL injection Inject 3 mg under the skin 1 (one) time per week. FRIDAYS      valsartan (Diovan) 320 mg tablet Take 1 tablet (320 mg) by mouth once daily. (Patient taking differently: Take 1 tablet (320 mg) by mouth once daily in the morning.) 90 tablet 1     No current facility-administered medications for this visit.       Review of Systems   Constitutional: Negative.    HENT: Negative.     Eyes: Negative.    Respiratory: Negative.     Cardiovascular:  Positive for leg swelling.   Gastrointestinal: Negative.    Endocrine: Negative.    Genitourinary: Negative.    Musculoskeletal: Negative.    Skin: Negative.    Allergic/Immunologic: Negative.    Neurological: Negative.    Hematological: Negative.    Psychiatric/Behavioral: Negative.                Physical Exam  Vitals reviewed.   Constitutional:       Appearance: Normal appearance. She is obese.   HENT:      Head: Normocephalic and atraumatic.      Nose: Nose normal.      Mouth/Throat:      Mouth: Mucous membranes are moist.      Pharynx: Oropharynx is clear.   Eyes:      Extraocular Movements: Extraocular movements intact.      Conjunctiva/sclera: Conjunctivae normal.      Pupils: Pupils are equal, round, and reactive to light.   Cardiovascular:      Rate and Rhythm: Normal rate and regular rhythm.      Pulses: Normal pulses.      Heart sounds: Normal heart sounds.   Pulmonary:  "     Effort: Pulmonary effort is normal.      Breath sounds: Normal breath sounds.   Abdominal:      General: Bowel sounds are normal.      Palpations: Abdomen is soft.   Genitourinary:     Comments: Assessment deferred to physician    Musculoskeletal:      Cervical back: Normal range of motion and neck supple.      Right lower leg: Edema present.      Left lower leg: Edema present.   Skin:     General: Skin is warm and dry.   Neurological:      General: No focal deficit present.      Mental Status: She is alert and oriented to person, place, and time.   Psychiatric:         Mood and Affect: Mood normal.         Behavior: Behavior normal.         Thought Content: Thought content normal.         Judgment: Judgment normal.          PAT AIRWAY:   Airway:     Mallampati::  III    TM distance::  <3 FB    Neck ROM::  Full  normal          Visit Vitals  BP (!) 136/91   Pulse 71   Temp 37 °C (98.6 °F) (Temporal)   Ht 1.626 m (5' 4\")   Wt 141 kg (311 lb 1.6 oz)   LMP 2024 (Approximate) Comment: LMP LESS THAN 1 YEAR AGO   SpO2 97%   BMI 53.40 kg/m²   OB Status Perimenopausal   Smoking Status Former   BSA 2.52 m²     ASA: III  CHADS2: 4.0%  RCRI: 0.4%  DASI:36.7  METS:7.3  STOP BAN        Assessment and Plan:     Chest pain/ GERD?:Omeprazole, EGD  Hypertension: hydrochlorothiazide, Valsartan  Hyperlipidemia: Atorvastatin  Diabetes type 2: Metformin, Trulicity  Sleep Apnea: wears C-PAP occasionally   Anxiety/depression: Celexa  Albuterol inhaler: Patient denies asthma but states she had wheezing in her lungs and her doctor prescribed inhaler to have as needed  BMI: 53.40    EKG 1/10/25 reviewed  LABS: 1/10/25  Stress test 25  Impression   1. Negative myocardial perfusion study without evidence of inducible  myocardial ischemia or prior infarction.  2. The left ventricle is normal in size.  3. Normal LV wall motion with a post-stress LV EF estimated greater  than 65%.     Arlen Boateng, APRN-CNP        "

## 2025-04-16 ENCOUNTER — ANESTHESIA EVENT (OUTPATIENT)
Dept: GASTROENTEROLOGY | Facility: HOSPITAL | Age: 55
End: 2025-04-16
Payer: COMMERCIAL

## 2025-04-16 SDOH — HEALTH STABILITY: MENTAL HEALTH: CURRENT SMOKER: 0

## 2025-04-17 ENCOUNTER — ANESTHESIA (OUTPATIENT)
Dept: GASTROENTEROLOGY | Facility: HOSPITAL | Age: 55
End: 2025-04-17
Payer: COMMERCIAL

## 2025-04-17 ENCOUNTER — HOSPITAL ENCOUNTER (OUTPATIENT)
Dept: GASTROENTEROLOGY | Facility: HOSPITAL | Age: 55
Discharge: HOME | End: 2025-04-17
Payer: COMMERCIAL

## 2025-04-17 VITALS
SYSTOLIC BLOOD PRESSURE: 116 MMHG | TEMPERATURE: 96.8 F | DIASTOLIC BLOOD PRESSURE: 73 MMHG | RESPIRATION RATE: 16 BRPM | OXYGEN SATURATION: 96 % | HEIGHT: 64 IN | HEART RATE: 72 BPM | WEIGHT: 293 LBS | BODY MASS INDEX: 50.02 KG/M2

## 2025-04-17 DIAGNOSIS — K21.9 GERD WITH APNEA: ICD-10-CM

## 2025-04-17 DIAGNOSIS — R06.81 GERD WITH APNEA: ICD-10-CM

## 2025-04-17 PROBLEM — J44.9 CHRONIC OBSTRUCTIVE PULMONARY DISEASE (MULTI): Status: ACTIVE | Noted: 2025-04-17

## 2025-04-17 PROBLEM — Z87.891 FORMER CIGARETTE SMOKER: Status: ACTIVE | Noted: 2025-04-17

## 2025-04-17 LAB — GLUCOSE BLD MANUAL STRIP-MCNC: 116 MG/DL (ref 74–99)

## 2025-04-17 PROCEDURE — A43239 PR EDG TRANSORAL BIOPSY SINGLE/MULTIPLE: Performed by: ANESTHESIOLOGY

## 2025-04-17 PROCEDURE — 7100000010 HC PHASE TWO TIME - EACH INCREMENTAL 1 MINUTE

## 2025-04-17 PROCEDURE — 3700000002 HC GENERAL ANESTHESIA TIME - EACH INCREMENTAL 1 MINUTE

## 2025-04-17 PROCEDURE — A43239 PR EDG TRANSORAL BIOPSY SINGLE/MULTIPLE

## 2025-04-17 PROCEDURE — 43239 EGD BIOPSY SINGLE/MULTIPLE: CPT | Performed by: INTERNAL MEDICINE

## 2025-04-17 PROCEDURE — 82947 ASSAY GLUCOSE BLOOD QUANT: CPT

## 2025-04-17 PROCEDURE — 2500000004 HC RX 250 GENERAL PHARMACY W/ HCPCS (ALT 636 FOR OP/ED)

## 2025-04-17 PROCEDURE — 7100000009 HC PHASE TWO TIME - INITIAL BASE CHARGE

## 2025-04-17 PROCEDURE — 7100000002 HC RECOVERY ROOM TIME - EACH INCREMENTAL 1 MINUTE

## 2025-04-17 PROCEDURE — 7100000001 HC RECOVERY ROOM TIME - INITIAL BASE CHARGE

## 2025-04-17 PROCEDURE — 3700000001 HC GENERAL ANESTHESIA TIME - INITIAL BASE CHARGE

## 2025-04-17 RX ORDER — MIDAZOLAM HYDROCHLORIDE 1 MG/ML
INJECTION, SOLUTION INTRAMUSCULAR; INTRAVENOUS AS NEEDED
Status: DISCONTINUED | OUTPATIENT
Start: 2025-04-17 | End: 2025-04-17

## 2025-04-17 RX ORDER — ALBUTEROL SULFATE 0.83 MG/ML
2.5 SOLUTION RESPIRATORY (INHALATION) ONCE AS NEEDED
Status: DISCONTINUED | OUTPATIENT
Start: 2025-04-17 | End: 2025-04-18 | Stop reason: HOSPADM

## 2025-04-17 RX ORDER — FENTANYL CITRATE 50 UG/ML
INJECTION, SOLUTION INTRAMUSCULAR; INTRAVENOUS AS NEEDED
Status: DISCONTINUED | OUTPATIENT
Start: 2025-04-17 | End: 2025-04-17

## 2025-04-17 RX ORDER — ONDANSETRON HYDROCHLORIDE 2 MG/ML
4 INJECTION, SOLUTION INTRAVENOUS ONCE AS NEEDED
Status: DISCONTINUED | OUTPATIENT
Start: 2025-04-17 | End: 2025-04-18 | Stop reason: HOSPADM

## 2025-04-17 RX ORDER — SODIUM CHLORIDE, SODIUM LACTATE, POTASSIUM CHLORIDE, CALCIUM CHLORIDE 600; 310; 30; 20 MG/100ML; MG/100ML; MG/100ML; MG/100ML
50 INJECTION, SOLUTION INTRAVENOUS CONTINUOUS
Status: ACTIVE | OUTPATIENT
Start: 2025-04-17 | End: 2025-04-17

## 2025-04-17 RX ORDER — KETAMINE HYDROCHLORIDE 50 MG/ML
INJECTION, SOLUTION INTRAMUSCULAR; INTRAVENOUS AS NEEDED
Status: DISCONTINUED | OUTPATIENT
Start: 2025-04-17 | End: 2025-04-17

## 2025-04-17 RX ORDER — PROPOFOL 10 MG/ML
INJECTION, EMULSION INTRAVENOUS CONTINUOUS PRN
Status: DISCONTINUED | OUTPATIENT
Start: 2025-04-17 | End: 2025-04-17

## 2025-04-17 RX ADMIN — SODIUM CHLORIDE, SODIUM LACTATE, POTASSIUM CHLORIDE, AND CALCIUM CHLORIDE: .6; .31; .03; .02 INJECTION, SOLUTION INTRAVENOUS at 08:48

## 2025-04-17 RX ADMIN — MIDAZOLAM 1 MG: 1 INJECTION INTRAMUSCULAR; INTRAVENOUS at 08:39

## 2025-04-17 RX ADMIN — PROPOFOL 50 MG: 10 INJECTION, EMULSION INTRAVENOUS at 08:40

## 2025-04-17 RX ADMIN — FENTANYL CITRATE 50 MCG: 50 INJECTION, SOLUTION INTRAMUSCULAR; INTRAVENOUS at 08:39

## 2025-04-17 RX ADMIN — KETAMINE HYDROCHLORIDE 10 MG: 50 INJECTION, SOLUTION INTRAMUSCULAR; INTRAVENOUS at 08:39

## 2025-04-17 RX ADMIN — PROPOFOL 75 MCG/KG/MIN: 10 INJECTION, EMULSION INTRAVENOUS at 08:39

## 2025-04-17 ASSESSMENT — PAIN SCALES - GENERAL
PAINLEVEL_OUTOF10: 0 - NO PAIN

## 2025-04-17 ASSESSMENT — PAIN - FUNCTIONAL ASSESSMENT
PAIN_FUNCTIONAL_ASSESSMENT: 0-10

## 2025-04-17 NOTE — DISCHARGE INSTRUCTIONS
Instructions  Patient Instructions after a Colonoscopy, Upper GI, and ERCP      The anesthetics, sedatives or narcotics which were given to you today will be acting in your body for the next 24 hours, so you might feel a little sleepy or groggy.  This feeling should slowly wear off. Carefully read and follow the instructions.      You received sedation today:  - Do not drive or operate any machinery or power tools of any kind.   - No alcoholic beverages today, not even beer or wine.  - Do not make any important decisions or sign any legal documents.  - No over the counter medications that contain alcohol or that may cause drowsiness.  - Do not make any important decisions or sign any legal documents.     While it is common to experience mild to moderate abdominal distention, gas, or belching after your procedure, if any of these symptoms occur following discharge from the GI Lab or within one week of having your procedure, call the [facility] to be advised whether a visit to your nearest Urgent Care or Emergency Department is indicated.  Take this paper with you if you go.      - If you develop an allergic reaction to the medications that were given during your procedure such as difficulty breathing, rash, hives, severe nausea, vomiting or lightheadedness.- If you experience chest pain, shortness of breath, severe abdominal pain, fevers and chills.     -If you develop signs and symptoms of bleeding such as blood in your spit, if your stools turn black, tarry, or bloody     - If you have not urinated within 8 hours following your procedure.- If your IV site becomes painful, red, inflamed, or looks infected.     If you received a biopsy/polypectomy/sphincterotomy the following instructions apply below:     - Do not use Aspirin containing products, non-steroidal medications or anti-coagulants for one week following your procedure. (Examples of these types of medications are: Advil, Arthrotec, Aleve,  Coumadin, Ecotrin, Heparin, Ibuprofen, Indocin, Motrin, Naprosyn, Nuprin, Plavix, Vioxx, and Voltarin, or their generic forms.  This list is not all-inclusive.  Check with your physician or pharmacist before resuming medications.)      - Eat a soft diet today.  Avoid foods that are poorly digested for the next 24 hours.  These foods would include: nuts, beans, lettuce, red meats, and fried foods.       - Start with liquids and advance your diet as tolerated, gradually work up to eating solids.      - Do not have a Barium Study or Enema for one week.     Your physician recommends the additional following instructions:     Colonoscopy: Resume your previous diet, continue your present medications, a repeated colonoscopy will be determined based on pathology result. Return to normal activity tomorrow.      Upper GI endoscopy: Resume your previous diet, continue your medications, recommendation to repeat upper endoscopy in three months for follow up of Josue's ablation. Return to normal activity tomorrow.      ERCP: Recommended a repeat ERCP in eight weeks to exchange a stent. Watch for symptoms of pancreatitis, bleeding, perforation and cholangitis.  Please see Medication Reconciliation Form for new medication/medications prescribed.     If you experience any problems or have any questions following discharge from the GI Lab, please call:  Before 5p.m.  [phone]  After 5p.m.    [phone]     Nurse Signature                                                                        Date___________________                                                                            Patient/Responsible Party Signature                                        Date___________________

## 2025-04-17 NOTE — ANESTHESIA POSTPROCEDURE EVALUATION
Patient: Vilma Grover    Procedure Summary       Date: 04/17/25 Room / Location: [facility]    Anesthesia Start: 0818 Anesthesia Stop: 0852    Procedure: EGD Diagnosis: GERD with apnea    Scheduled Providers: Juve Givens MD; Alexandra Villagomez MD; HERMES Orr Responsible Provider: Alexandra Villagomez MD    Anesthesia Type: MAC ASA Status: 3            Anesthesia Type: MAC    Vitals Value Taken Time   /77 04/17/25 08:50   Temp 37 °C (98.6 °F) 04/17/25 08:50   Pulse 74 04/17/25 08:50   Resp 20 04/17/25 08:50   SpO2 95 % 04/17/25 08:50       Anesthesia Post Evaluation    Patient location during evaluation: PACU  Patient participation: complete - patient participated  Level of consciousness: awake and alert  Pain management: adequate  Airway patency: patent  Cardiovascular status: acceptable  Respiratory status: acceptable and room air  Hydration status: acceptable  Postoperative Nausea and Vomiting: none        No notable events documented.

## 2025-04-17 NOTE — ANESTHESIA PREPROCEDURE EVALUATION
Patient: Vilma Grover    Procedure Information       Date/Time: 25 3351    Scheduled providers: Juve Givens MD; Alexandra Villagomez MD; HERMES Orr    Procedure: EGD    Location: [facility]            Relevant Problems   Cardiac   (+) Benign essential hypertension   (+) Hyperlipidemia   (+) Other chest pain      Pulmonary   (+) Chronic obstructive pulmonary disease (Multi)      Neuro   (+) Anxiety disorder   (+) Depression      Endocrine   (+) Obesity, morbid, BMI 50 or higher (Multi)   (+) Type 2 diabetes mellitus without complication, without long-term current use of insulin      Nervous   (+) RLS (restless legs syndrome)      Respiratory   (+) Obstructive sleep apnea syndrome      Tobacco   (+) Former cigarette smoker, 29 pk yr, quit      Surgical History[1]    Clinical information reviewed:                   NPO Detail:  No data recorded     Physical Exam    Airway  Mallampati: III  TM distance: >3 FB  Neck ROM: full  Mouth opening: 3 or more finger widths     Cardiovascular    Dental    Pulmonary    Abdominal            Anesthesia Plan    History of general anesthesia?: yes  History of complications of general anesthesia?: no    ASA 3     MAC     The patient is not a current smoker.  Education provided regarding risk of obstructive sleep apnea.  intravenous induction   Anesthetic plan and risks discussed with patient.    Plan discussed with CAA.           [1]   Past Surgical History:  Procedure Laterality Date    APPENDECTOMY      BREAST BIOPSY Right     2018 right stereo bx benign through ccl     SECTION, CLASSIC       Section     SECTION, CLASSIC       SECTION, CLASSIC      baby passed away    CONDYLOMA EXCISION/FULGURATION      OTHER SURGICAL HISTORY  10/23/2014    Bunion Correction By Alfred Procedure    OTHER SURGICAL HISTORY      tendon transfer

## 2025-04-17 NOTE — NURSING NOTE
Pt sitting up .  Tolerates sips of water and crackers.    Discharge instructions discussed and handed to pt. No further questions.

## 2025-04-21 LAB
LABORATORY COMMENT REPORT: NORMAL
PATH REPORT.FINAL DX SPEC: NORMAL
PATH REPORT.GROSS SPEC: NORMAL
PATH REPORT.RELEVANT HX SPEC: NORMAL
PATH REPORT.TOTAL CANCER: NORMAL

## 2025-05-12 ENCOUNTER — OFFICE VISIT (OUTPATIENT)
Dept: URGENT CARE | Age: 55
End: 2025-05-12
Payer: COMMERCIAL

## 2025-05-12 VITALS
DIASTOLIC BLOOD PRESSURE: 69 MMHG | HEART RATE: 85 BPM | RESPIRATION RATE: 19 BRPM | TEMPERATURE: 97.6 F | SYSTOLIC BLOOD PRESSURE: 142 MMHG | OXYGEN SATURATION: 98 %

## 2025-05-12 DIAGNOSIS — J01.00 ACUTE MAXILLARY SINUSITIS, RECURRENCE NOT SPECIFIED: Primary | ICD-10-CM

## 2025-05-12 DIAGNOSIS — J02.9 SORE THROAT: ICD-10-CM

## 2025-05-12 LAB
POC HUMAN RHINOVIRUS PCR: NEGATIVE
POC INFLUENZA A VIRUS PCR: NEGATIVE
POC INFLUENZA B VIRUS PCR: NEGATIVE
POC RESPIRATORY SYNCYTIAL VIRUS PCR: NEGATIVE
POC STREPTOCOCCUS PYOGENES (GROUP A STREP) PCR: NEGATIVE

## 2025-05-12 PROCEDURE — 1036F TOBACCO NON-USER: CPT | Performed by: PHYSICIAN ASSISTANT

## 2025-05-12 PROCEDURE — 3078F DIAST BP <80 MM HG: CPT | Performed by: PHYSICIAN ASSISTANT

## 2025-05-12 PROCEDURE — G8433 SCR FOR DEP NOT CPT DOC RSN: HCPCS | Performed by: PHYSICIAN ASSISTANT

## 2025-05-12 PROCEDURE — 99214 OFFICE O/P EST MOD 30 MIN: CPT | Performed by: PHYSICIAN ASSISTANT

## 2025-05-12 PROCEDURE — 3052F HG A1C>EQUAL 8.0%<EQUAL 9.0%: CPT | Performed by: PHYSICIAN ASSISTANT

## 2025-05-12 PROCEDURE — 87631 RESP VIRUS 3-5 TARGETS: CPT | Performed by: PHYSICIAN ASSISTANT

## 2025-05-12 PROCEDURE — 3077F SYST BP >= 140 MM HG: CPT | Performed by: PHYSICIAN ASSISTANT

## 2025-05-12 PROCEDURE — 4010F ACE/ARB THERAPY RXD/TAKEN: CPT | Performed by: PHYSICIAN ASSISTANT

## 2025-05-12 PROCEDURE — 87651 STREP A DNA AMP PROBE: CPT | Performed by: PHYSICIAN ASSISTANT

## 2025-05-12 RX ORDER — AMOXICILLIN AND CLAVULANATE POTASSIUM 875; 125 MG/1; MG/1
875 TABLET, FILM COATED ORAL 2 TIMES DAILY
Qty: 20 TABLET | Refills: 0 | Status: SHIPPED | OUTPATIENT
Start: 2025-05-12

## 2025-05-12 RX ORDER — IBUPROFEN 600 MG/1
600 TABLET ORAL 4 TIMES DAILY PRN
Qty: 90 TABLET | Refills: 0 | Status: SHIPPED | OUTPATIENT
Start: 2025-05-12 | End: 2026-05-12

## 2025-05-23 DIAGNOSIS — E11.9 TYPE 2 DIABETES MELLITUS WITHOUT COMPLICATIONS: ICD-10-CM

## 2025-05-23 DIAGNOSIS — E11.9 TYPE 2 DIABETES MELLITUS WITHOUT COMPLICATION, WITHOUT LONG-TERM CURRENT USE OF INSULIN: Primary | ICD-10-CM

## 2025-05-23 RX ORDER — DULAGLUTIDE 3 MG/.5ML
3 INJECTION, SOLUTION SUBCUTANEOUS
Qty: 2 ML | Refills: 2 | Status: SHIPPED | OUTPATIENT
Start: 2025-05-25

## 2025-05-26 RX ORDER — DULAGLUTIDE 3 MG/.5ML
3 INJECTION, SOLUTION SUBCUTANEOUS
Qty: 2 ML | Refills: 11 | OUTPATIENT
Start: 2025-06-01

## 2025-05-28 ENCOUNTER — OFFICE VISIT (OUTPATIENT)
Dept: PRIMARY CARE | Facility: CLINIC | Age: 55
End: 2025-05-28
Payer: COMMERCIAL

## 2025-05-28 VITALS
BODY MASS INDEX: 48.82 KG/M2 | DIASTOLIC BLOOD PRESSURE: 82 MMHG | OXYGEN SATURATION: 98 % | RESPIRATION RATE: 16 BRPM | SYSTOLIC BLOOD PRESSURE: 130 MMHG | HEART RATE: 77 BPM | WEIGHT: 293 LBS | HEIGHT: 65 IN

## 2025-05-28 DIAGNOSIS — R05.2 SUBACUTE COUGH: ICD-10-CM

## 2025-05-28 DIAGNOSIS — E11.9 TYPE 2 DIABETES MELLITUS WITHOUT COMPLICATION, WITHOUT LONG-TERM CURRENT USE OF INSULIN: ICD-10-CM

## 2025-05-28 DIAGNOSIS — E78.2 MIXED HYPERLIPIDEMIA: Primary | ICD-10-CM

## 2025-05-28 DIAGNOSIS — J41.0 SIMPLE CHRONIC BRONCHITIS (MULTI): ICD-10-CM

## 2025-05-28 LAB — POC HEMOGLOBIN A1C: 7.4 % (ref 4.2–6.5)

## 2025-05-28 PROCEDURE — 4010F ACE/ARB THERAPY RXD/TAKEN: CPT

## 2025-05-28 PROCEDURE — 1036F TOBACCO NON-USER: CPT

## 2025-05-28 PROCEDURE — 99214 OFFICE O/P EST MOD 30 MIN: CPT

## 2025-05-28 PROCEDURE — 3079F DIAST BP 80-89 MM HG: CPT

## 2025-05-28 PROCEDURE — 83036 HEMOGLOBIN GLYCOSYLATED A1C: CPT

## 2025-05-28 PROCEDURE — 3075F SYST BP GE 130 - 139MM HG: CPT

## 2025-05-28 PROCEDURE — 3008F BODY MASS INDEX DOCD: CPT

## 2025-05-28 PROCEDURE — 3051F HG A1C>EQUAL 7.0%<8.0%: CPT

## 2025-05-28 RX ORDER — BENZONATATE 100 MG/1
100 CAPSULE ORAL 3 TIMES DAILY PRN
Qty: 42 CAPSULE | Refills: 0 | Status: SHIPPED | OUTPATIENT
Start: 2025-05-28 | End: 2025-06-11

## 2025-05-28 ASSESSMENT — PATIENT HEALTH QUESTIONNAIRE - PHQ9
2. FEELING DOWN, DEPRESSED OR HOPELESS: SEVERAL DAYS
2. FEELING DOWN, DEPRESSED OR HOPELESS: NOT AT ALL
SUM OF ALL RESPONSES TO PHQ9 QUESTIONS 1 AND 2: 0
SUM OF ALL RESPONSES TO PHQ9 QUESTIONS 1 AND 2: 2
1. LITTLE INTEREST OR PLEASURE IN DOING THINGS: SEVERAL DAYS
1. LITTLE INTEREST OR PLEASURE IN DOING THINGS: NOT AT ALL

## 2025-05-28 ASSESSMENT — ENCOUNTER SYMPTOMS
OCCASIONAL FEELINGS OF UNSTEADINESS: 0
DEPRESSION: 0
LOSS OF SENSATION IN FEET: 0

## 2025-05-28 ASSESSMENT — PAIN SCALES - GENERAL: PAINLEVEL_OUTOF10: 5

## 2025-05-28 NOTE — PATIENT INSTRUCTIONS
Assessment & Plan  Type 2 diabetes mellitus without complication, without long-term current use of insulin    Orders:    POCT glycosylated hemoglobin (Hb A1C) manually resulted  Diabetes Type 2  A1C is now 7.4 from 8.7   Continue current medication.  Continue work on diet - recommend lots of fruits and vegetables, lean protein like chicken, turkey, fish, beans and Greek yogurt. Try to choose healthier carbohydrate options like oatmeal, wheat bread and pasta, sweet potatoes. Limit sugary treats.  Check a fasting sugar first thing in the AM once daily and keep a log of the results to bring to your next office visit.  Please contact office if your sugars are consistently >140.  Reevaluate in 3 months.     Simple chronic bronchitis (Multi)      Mixed hyperlipidemia    Decrease intake of saturated fats, fast food, sweets.  Increase intake of fresh fruit fresh vegetables and lean meats.  Increase healthy fats seeds, nuts, olive oil instead of butter.  walk 150 minutes/week for heart health.   Aim for 25-30 grams of fiber in your diet daily.  May consider adding Fish Oil supplement 1,200 mg per day or Omega 3 Supplement daily.      Subacute cough    Orders:    benzonatate (Tessalon) 100 mg capsule; Take 1 capsule (100 mg) by mouth 3 times a day as needed for cough for up to 14 days. Do not crush or chew.  Take tessalon as needed up to 3 times daily.   Increase fluids 8-10 glasses pf water per day.   Use a humidifier next to your bed nightly

## 2025-05-28 NOTE — ASSESSMENT & PLAN NOTE
Orders:    POCT glycosylated hemoglobin (Hb A1C) manually resulted  Diabetes Type 2  A1C is now 7.4 from 8.7   Continue current medication.  Continue work on diet - recommend lots of fruits and vegetables, lean protein like chicken, turkey, fish, beans and Greek yogurt. Try to choose healthier carbohydrate options like oatmeal, wheat bread and pasta, sweet potatoes. Limit sugary treats.  Check a fasting sugar first thing in the AM once daily and keep a log of the results to bring to your next office visit.  Please contact office if your sugars are consistently >140.  Reevaluate in 3 months.

## 2025-05-28 NOTE — PROGRESS NOTES
"Subjective   Patient ID: Vilma Grover is a 54 y.o. female who presents for 3 Month follow up.    HPI   DM   patient monitoring blood sugar at home   compliant on metformin 500 mg at bedtime 1000 mg in the morning   Trulicity 3 mg once weekly on Friday  One alarm below 70 in the middle of the night  sugars- average 130-170    HLD  Diet is not  complaint- due to increased life events   She does not  exercise  Compliant on atorvastatin 20 mg once daily    Cough   Dry cough ongoing since the beginning of may   Echin ea and lemon tea helped     Mom coming to live with her due to the cost of memory care  She has increasing stress with that as well.     Review of Systems  Review of Systems negative except as noted in HPI and Chief complaint.  Current Medications[1]    Objective   /82 (BP Location: Left arm, Patient Position: Sitting, BP Cuff Size: Adult)   Pulse 77   Resp 16   Ht 1.651 m (5' 5\")   Wt 146 kg (321 lb 3.2 oz)   SpO2 98%   BMI 53.45 kg/m²     Physical Exam  Vitals reviewed.   Cardiovascular:      Rate and Rhythm: Normal rate.   Pulmonary:      Effort: Pulmonary effort is normal.   Musculoskeletal:      Cervical back: Normal range of motion.   Neurological:      General: No focal deficit present.      Mental Status: She is alert.   Psychiatric:         Mood and Affect: Mood normal.       Assessment/Plan   Assessment & Plan  Type 2 diabetes mellitus without complication, without long-term current use of insulin    Orders:    POCT glycosylated hemoglobin (Hb A1C) manually resulted  Diabetes Type 2  A1C is now 7.4 from 8.7   Continue current medication.  Continue work on diet - recommend lots of fruits and vegetables, lean protein like chicken, turkey, fish, beans and Greek yogurt. Try to choose healthier carbohydrate options like oatmeal, wheat bread and pasta, sweet potatoes. Limit sugary treats.  Check a fasting sugar first thing in the AM once daily and keep a log of the results to bring to " your next office visit.  Please contact office if your sugars are consistently >140.  Reevaluate in 3 months.     Simple chronic bronchitis (Multi)      Mixed hyperlipidemia    Decrease intake of saturated fats, fast food, sweets.  Increase intake of fresh fruit fresh vegetables and lean meats.  Increase healthy fats seeds, nuts, olive oil instead of butter.  walk 150 minutes/week for heart health.   Aim for 25-30 grams of fiber in your diet daily.  May consider adding Fish Oil supplement 1,200 mg per day or Omega 3 Supplement daily.      Subacute cough    Orders:    benzonatate (Tessalon) 100 mg capsule; Take 1 capsule (100 mg) by mouth 3 times a day as needed for cough for up to 14 days. Do not crush or chew.  Take tessalon as needed up to 3 times daily.   Increase fluids 8-10 glasses pf water per day.   Use a humidifier next to your bed nightly      This note was dictated using DRAGON speech recognition software and was corrected for spelling or grammatical errors, but despite proofreading several typographical errors might be present that might affect the meaning of the content.  Rachel Savage, CNP       [1]   Current Outpatient Medications:     acetaminophen (Tylenol) 500 mg tablet, Take 2 tablets (1,000 mg) by mouth every 6 hours if needed for mild pain (1 - 3)., Disp: , Rfl:     albuterol 90 mcg/actuation inhaler, Inhale 2 puffs every 4 hours if needed for wheezing or shortness of breath. Q4h while awake x 4 days then prn, please add spacer, Disp: 18 g, Rfl: 0    amoxicillin-clavulanate (Augmentin) 875-125 mg tablet, Take 1 tablet (875 mg) by mouth 2 times a day., Disp: 20 tablet, Rfl: 0    aspirin ([place] Aspirin) 81 mg EC tablet, Take 1 tablet (81 mg) by mouth once daily. Last dose 4-11-25, Disp: , Rfl:     atorvastatin (Lipitor) 20 mg tablet, Take 1 tablet (20 mg) by mouth once daily., Disp: 90 tablet, Rfl: 3    cholecalciferol (Vitamin D3) 50 MCG (2000 UT) tablet, Take 1 tablet (50 mcg) by mouth  once daily at bedtime., Disp: , Rfl:     citalopram (CeleXA) 40 mg tablet, TAKE ONE TABLET BY MOUTH EVERY DAY, Disp: 30 tablet, Rfl: 5    cyanocobalamin (Vitamin B-12) 100 mcg tablet, Take 1 tablet (100 mcg) by mouth once daily. For 30 days, Disp: , Rfl:     ferrous sulfate 325 (65 Fe) MG EC tablet, Take 65 mg by mouth once daily with breakfast. Do not crush, chew, or split., Disp: , Rfl:     FreeStyle Darshan 3 Sensor device, Apply to back of arm once every 15 days for blood glucose management, Disp: 2 each, Rfl: 3    hydroCHLOROthiazide (HYDRODiuril) 25 mg tablet, Take 1 tablet (25 mg) by mouth once daily., Disp: 90 tablet, Rfl: 1    ibuprofen 600 mg tablet, Take 1 tablet (600 mg) by mouth 4 times a day as needed for mild pain (1 - 3) (pain)., Disp: 90 tablet, Rfl: 0    lancets misc, Use to monitor blood glucose once daily, Disp: 100 each, Rfl: 1    metFORMIN XR (Glucophage-XR) 500 mg 24 hr tablet, Take 1 tablet (500 mg) by mouth once daily at bedtime AND 2 tablets (1,000 mg) once daily in the morning. Do not crush, chew, or split.. (Patient taking differently: Take 1 tablet (500 mg) by mouth once daily IN MORNING AND 2 tablets (1,000 mg) once daily AT NIGHT Do not crush, chew, or split..), Disp: 270 tablet, Rfl: 0    MULTIVITAMIN ORAL, Take 1 tablet by mouth once daily. For 30 days, Disp: , Rfl:     omeprazole (PriLOSEC) 40 mg DR capsule, Take 1 capsule (40 mg) by mouth early in the morning.., Disp: , Rfl:     OneTouch Ultra Test strip, 1 each once daily., Disp: 100 each, Rfl: 1    Trulicity 3 mg/0.5 mL injection, Inject 3 mg under the skin 1 (one) time per week. FRIDAYS, Disp: 2 mL, Rfl: 2    valsartan (Diovan) 320 mg tablet, Take 1 tablet (320 mg) by mouth once daily. (Patient taking differently: Take 1 tablet (320 mg) by mouth once daily in the morning.), Disp: 90 tablet, Rfl: 1    benzonatate (Tessalon) 100 mg capsule, Take 1 capsule (100 mg) by mouth 3 times a day as needed for cough for up to 14 days. Do not  crush or chew., Disp: 42 capsule, Rfl: 0

## 2025-05-30 DIAGNOSIS — F41.1 GENERALIZED ANXIETY DISORDER: ICD-10-CM

## 2025-05-30 RX ORDER — CITALOPRAM 40 MG/1
40 TABLET ORAL DAILY
Qty: 90 TABLET | Refills: 1 | Status: SHIPPED | OUTPATIENT
Start: 2025-05-30

## 2025-07-15 ENCOUNTER — OFFICE VISIT (OUTPATIENT)
Dept: OBSTETRICS AND GYNECOLOGY | Facility: CLINIC | Age: 55
End: 2025-07-15
Payer: COMMERCIAL

## 2025-07-15 VITALS — DIASTOLIC BLOOD PRESSURE: 99 MMHG | BODY MASS INDEX: 52.75 KG/M2 | SYSTOLIC BLOOD PRESSURE: 171 MMHG | WEIGHT: 293 LBS

## 2025-07-15 DIAGNOSIS — E66.813 CLASS 3 SEVERE OBESITY DUE TO EXCESS CALORIES WITH BODY MASS INDEX (BMI) OF 50.0 TO 59.9 IN ADULT, UNSPECIFIED WHETHER SERIOUS COMORBIDITY PRESENT: ICD-10-CM

## 2025-07-15 DIAGNOSIS — Z78.0 MENOPAUSE: Primary | ICD-10-CM

## 2025-07-15 DIAGNOSIS — N95.1 MENOPAUSAL VASOMOTOR SYNDROME: ICD-10-CM

## 2025-07-15 PROCEDURE — 99214 OFFICE O/P EST MOD 30 MIN: CPT | Performed by: STUDENT IN AN ORGANIZED HEALTH CARE EDUCATION/TRAINING PROGRAM

## 2025-07-15 PROCEDURE — 3077F SYST BP >= 140 MM HG: CPT | Performed by: STUDENT IN AN ORGANIZED HEALTH CARE EDUCATION/TRAINING PROGRAM

## 2025-07-15 PROCEDURE — 4010F ACE/ARB THERAPY RXD/TAKEN: CPT | Performed by: STUDENT IN AN ORGANIZED HEALTH CARE EDUCATION/TRAINING PROGRAM

## 2025-07-15 PROCEDURE — 3051F HG A1C>EQUAL 7.0%<8.0%: CPT | Performed by: STUDENT IN AN ORGANIZED HEALTH CARE EDUCATION/TRAINING PROGRAM

## 2025-07-15 PROCEDURE — 3080F DIAST BP >= 90 MM HG: CPT | Performed by: STUDENT IN AN ORGANIZED HEALTH CARE EDUCATION/TRAINING PROGRAM

## 2025-07-15 PROCEDURE — 1036F TOBACCO NON-USER: CPT | Performed by: STUDENT IN AN ORGANIZED HEALTH CARE EDUCATION/TRAINING PROGRAM

## 2025-07-15 RX ORDER — ESTRADIOL/NORETHINDRONE ACETATE TRANSDERMAL SYSTEM .05; .14 MG/D; MG/D
1 PATCH, EXTENDED RELEASE TRANSDERMAL 2 TIMES WEEKLY
Qty: 8 PATCH | Refills: 11 | Status: SHIPPED | OUTPATIENT
Start: 2025-07-17 | End: 2026-07-17

## 2025-07-15 ASSESSMENT — LIFESTYLE VARIABLES
SKIP TO QUESTIONS 9-10: 1
HOW OFTEN DO YOU HAVE SIX OR MORE DRINKS ON ONE OCCASION: NEVER
HOW MANY STANDARD DRINKS CONTAINING ALCOHOL DO YOU HAVE ON A TYPICAL DAY: 1 OR 2
HOW OFTEN DO YOU HAVE A DRINK CONTAINING ALCOHOL: MONTHLY OR LESS
AUDIT-C TOTAL SCORE: 1

## 2025-07-15 ASSESSMENT — PATIENT HEALTH QUESTIONNAIRE - PHQ9
1. LITTLE INTEREST OR PLEASURE IN DOING THINGS: NOT AT ALL
2. FEELING DOWN, DEPRESSED OR HOPELESS: NOT AT ALL
SUM OF ALL RESPONSES TO PHQ9 QUESTIONS 1 & 2: 0

## 2025-07-15 ASSESSMENT — PAIN SCALES - GENERAL: PAINLEVEL_OUTOF10: 7

## 2025-07-15 NOTE — PROGRESS NOTES
Subjective   Patient ID: Vilma Grover is a 54 y.o. female who presents for Menopause (Patient here to talk about HRT/Patient denies falls/Patient states generalized pain 7/10/Last pap 10/12/2023 WNL/Mammogram 10/9/2024).  Has been without menses for >1 year. Has been having months of menopausal symptoms. Recently moved her mother with dementia into the house, very stressful. She is hoping that HRT will help her sleep quality and energy so she can try to lose weight as well.     Menstrual Pattern: none  Vasomotor Symptoms: hot flashes daily, disruptive to daily life  Vaginal Symptoms: none  Mood: agitated  CAD/VTE/Breast Ca/Abnl Bleeding/Liver Disease/Endo Ca: denies      PMH obesity, HTN, T2DM (HgbA1c 6.9)  PSH  x3, appendectomy, UE repair after dog attack  Smoking history: former, quit           Review of Systems   All other systems reviewed and are negative.      Objective   Physical Exam  Vitals reviewed.   Constitutional:       Appearance: Normal appearance. She is obese.   HENT:      Head: Normocephalic and atraumatic.      Mouth/Throat:      Mouth: Mucous membranes are moist.   Eyes:      Extraocular Movements: Extraocular movements intact.      Conjunctiva/sclera: Conjunctivae normal.      Pupils: Pupils are equal, round, and reactive to light.   Cardiovascular:      Rate and Rhythm: Normal rate.   Pulmonary:      Effort: Pulmonary effort is normal.   Abdominal:      Palpations: Abdomen is soft.   Musculoskeletal:         General: Normal range of motion.      Cervical back: Normal range of motion and neck supple.   Skin:     General: Skin is warm and dry.   Neurological:      General: No focal deficit present.      Mental Status: She is alert.   Psychiatric:         Mood and Affect: Mood normal.         Behavior: Behavior normal.         Thought Content: Thought content normal.         Judgment: Judgment normal.         Assessment/Plan   Problem List Items Addressed This Visit    None  Visit  Diagnoses         Codes      Menopause    -  Primary Z78.0          We consider the initiation of MHT to be a safe option for healthy, symptomatic women who are within 10 years of menopause or younger than age 60 years and who do not have contraindications to MHT    Contraindications to MHT include a history of breast cancer, CHD, a previous venous thromboembolic (VTE) event or stroke, active liver disease, unexplained vaginal bleeding, high-risk endometrial cancer, or transient ischemic attack    Our approach is to choose initial therapy based upon the woman's predominant symptom. If the main concern is depression and hot flashes are not severe, we start with an SSRI. On the other hand, if vasomotor symptoms are the major symptom and depression or mood symptoms are mild, we start with MHT. For women in whom depression and vasomotor symptoms are both severe, we start both estrogen and an SSRI and refer to a psychopharmacologist for further consultation and monitoring.     While patient has HTN uncontrolled today, she did not take meds this AM. Also has T2DM. Counseled on risks of VTE with comorbidities. After discussion with patient today, symptoms are hot flash predominant. Given obesity, plan for patch administration method. Will initiate Combipatch after counseling. Will also refer to Ob [facility]hav health for mental health counseling and Endocrine for weight loss management.   RTC in 3 months for follow up. Precautions for abnl VB or any abnl sympts provided.          Navya Hogan MD 07/15/25 8:57 AM

## 2025-07-24 ENCOUNTER — TELEPHONE (OUTPATIENT)
Dept: PRIMARY CARE | Facility: CLINIC | Age: 55
End: 2025-07-24
Payer: COMMERCIAL

## 2025-07-24 DIAGNOSIS — I10 BENIGN ESSENTIAL HYPERTENSION: ICD-10-CM

## 2025-07-24 RX ORDER — VALSARTAN 320 MG/1
320 TABLET ORAL DAILY
Qty: 90 TABLET | Refills: 1 | Status: SHIPPED | OUTPATIENT
Start: 2025-07-24

## 2025-07-24 RX ORDER — VALSARTAN 320 MG/1
320 TABLET ORAL DAILY
Qty: 90 TABLET | Refills: 1 | OUTPATIENT
Start: 2025-07-24

## 2025-08-12 ENCOUNTER — PATIENT MESSAGE (OUTPATIENT)
Dept: PRIMARY CARE | Facility: CLINIC | Age: 55
End: 2025-08-12
Payer: COMMERCIAL

## 2025-08-12 DIAGNOSIS — R42 VERTIGO: Primary | ICD-10-CM

## 2025-08-15 DIAGNOSIS — E11.9 TYPE 2 DIABETES MELLITUS WITHOUT COMPLICATION, WITHOUT LONG-TERM CURRENT USE OF INSULIN: ICD-10-CM

## 2025-08-15 RX ORDER — DULAGLUTIDE 3 MG/.5ML
3 INJECTION, SOLUTION SUBCUTANEOUS
Qty: 2 ML | Refills: 2 | Status: SHIPPED | OUTPATIENT
Start: 2025-08-17

## 2025-08-19 ENCOUNTER — CLINICAL SUPPORT (OUTPATIENT)
Facility: CLINIC | Age: 55
End: 2025-08-19
Payer: COMMERCIAL

## 2025-08-19 DIAGNOSIS — R42 DIZZINESS: Primary | ICD-10-CM

## 2025-08-19 DIAGNOSIS — R42 VERTIGO: ICD-10-CM

## 2025-08-19 PROCEDURE — 95992 CANALITH REPOSITIONING PROC: CPT | Mod: GP

## 2025-08-19 PROCEDURE — 97162 PT EVAL MOD COMPLEX 30 MIN: CPT | Mod: GP

## 2025-08-20 ENCOUNTER — TREATMENT (OUTPATIENT)
Facility: CLINIC | Age: 55
End: 2025-08-20
Payer: COMMERCIAL

## 2025-08-20 DIAGNOSIS — R42 DIZZINESS: ICD-10-CM

## 2025-08-20 PROCEDURE — 97140 MANUAL THERAPY 1/> REGIONS: CPT | Mod: GP

## 2025-08-22 DIAGNOSIS — E11.9 TYPE 2 DIABETES MELLITUS WITHOUT COMPLICATION, WITHOUT LONG-TERM CURRENT USE OF INSULIN: ICD-10-CM

## 2025-08-22 RX ORDER — BLOOD-GLUCOSE SENSOR
EACH MISCELLANEOUS
Qty: 2 EACH | Refills: 3 | Status: SHIPPED | OUTPATIENT
Start: 2025-08-22

## 2025-08-27 ENCOUNTER — APPOINTMENT (OUTPATIENT)
Facility: CLINIC | Age: 55
End: 2025-08-27
Payer: COMMERCIAL

## 2025-09-02 ENCOUNTER — APPOINTMENT (OUTPATIENT)
Dept: PRIMARY CARE | Facility: CLINIC | Age: 55
End: 2025-09-02
Payer: COMMERCIAL

## 2025-09-12 ENCOUNTER — APPOINTMENT (OUTPATIENT)
Dept: BEHAVIORAL HEALTH | Facility: CLINIC | Age: 55
End: 2025-09-12
Payer: COMMERCIAL

## 2025-10-15 ENCOUNTER — APPOINTMENT (OUTPATIENT)
Dept: OBSTETRICS AND GYNECOLOGY | Facility: CLINIC | Age: 55
End: 2025-10-15
Payer: COMMERCIAL

## 2025-10-21 ENCOUNTER — APPOINTMENT (OUTPATIENT)
Dept: OBSTETRICS AND GYNECOLOGY | Facility: CLINIC | Age: 55
End: 2025-10-21
Payer: COMMERCIAL

## 2026-02-19 ENCOUNTER — APPOINTMENT (OUTPATIENT)
Dept: ENDOCRINOLOGY | Facility: CLINIC | Age: 56
End: 2026-02-19
Payer: COMMERCIAL

## (undated) DEVICE — SUTURE, MONOCRYL, 4-0, 27 IN, PS-2, UNDYED

## (undated) DEVICE — APPLICATOR, CHLORAPREP, W/ORANGE TINT, 26ML

## (undated) DEVICE — DRAPE, SHEET, FAN FOLDED, HALF, 44 X 58 IN, DISPOSABLE, LF, STERILE

## (undated) DEVICE — STAPLER, SKIN PROXIMATE, 35 WIDE

## (undated) DEVICE — HIGH FLOW TIP FOR INTERPULSE HANDPIECE SET

## (undated) DEVICE — BANDAGE, ELASTIC, MATRIX, SELF-CLOSURE, 2 IN X 5 YD, LF

## (undated) DEVICE — SOLUTION, IRRIGATION, X RX SODIUM CHL 0.9%, 1000ML BTL

## (undated) DEVICE — BLANKET, LOWER BODY, VHA PLUS, ADULT

## (undated) DEVICE — TUBING, CORRUGATED, SMOOTH BORE, 6 FT

## (undated) DEVICE — CUFF, TOURNIQUET, 18 X 4, DUAL PORT/SNGL BLADDER, DISP, LF

## (undated) DEVICE — Device

## (undated) DEVICE — BANDAGE, ELASTIC, MATRIX, SELF-CLOSURE, 4 IN X 5 YD, LF

## (undated) DEVICE — SUTURE, VICRYL, 3-0, 27 IN, SH, VIOLET

## (undated) DEVICE — SUTURE, MONOCRYL, 4-0, 18 IN, PS2, UNDYED

## (undated) DEVICE — GLOVE, SURGICAL, PROTEXIS PI BLUE W/NEUTHERA, 8.0, PF, LF

## (undated) DEVICE — STRIP, SKIN CLOSURE, STERI STRIP, REINFORCED, 0.5 X 4 IN

## (undated) DEVICE — SUTURE, VICRYL PLUS 3-0, SH, 27IN

## (undated) DEVICE — CORD, BIPOLAR,  12 FT, DISPOSABLE, LF

## (undated) DEVICE — SOLUTION, CHLORHEXIDINE, 4%, 4OZ

## (undated) DEVICE — TIP, SUCTION, YANKAUER, FLEXIBLE

## (undated) DEVICE — GLOVE, SURGICAL, PROTEXIS PI , 7.5, PF, LF

## (undated) DEVICE — SUTURE, ETHIBOND, XTRA, 30 IN, 3-0, RB-1, GREEN

## (undated) DEVICE — PADDING, WEBRIL, UNDERCAST, STERILE, 3 IN

## (undated) DEVICE — TRAY, DRY PREP, PREMIUM

## (undated) DEVICE — COVER, CART, 45 X 27 X 48 IN, CLEAR

## (undated) DEVICE — TUBING, SUCTION, 6MM X 10, CLEAN N-COND

## (undated) DEVICE — DRAPE, PAD, PREP, W/ 9 IN CUFF, 24 X 41, LF, NS

## (undated) DEVICE — MANIFOLD, 4 PORT NEPTUNE STANDARD

## (undated) DEVICE — DRESSING, GAUZE, WASHED FLUFF, LARGE, STERILE